# Patient Record
Sex: MALE | Race: WHITE | NOT HISPANIC OR LATINO | URBAN - METROPOLITAN AREA
[De-identification: names, ages, dates, MRNs, and addresses within clinical notes are randomized per-mention and may not be internally consistent; named-entity substitution may affect disease eponyms.]

---

## 2017-05-03 ENCOUNTER — OUTPATIENT (OUTPATIENT)
Dept: OUTPATIENT SERVICES | Facility: HOSPITAL | Age: 77
LOS: 1 days | End: 2017-05-03

## 2017-05-03 ENCOUNTER — INPATIENT (INPATIENT)
Facility: HOSPITAL | Age: 77
LOS: 1 days | Discharge: HOME | End: 2017-05-05
Payer: MEDICARE

## 2017-05-03 PROCEDURE — 72125 CT NECK SPINE W/O DYE: CPT | Mod: 26

## 2017-05-03 PROCEDURE — 70450 CT HEAD/BRAIN W/O DYE: CPT | Mod: 26

## 2017-05-03 PROCEDURE — 73030 X-RAY EXAM OF SHOULDER: CPT | Mod: 26,RT

## 2017-05-03 PROCEDURE — 99285 EMERGENCY DEPT VISIT HI MDM: CPT

## 2017-05-03 PROCEDURE — 71010: CPT | Mod: 26

## 2017-05-04 ENCOUNTER — OUTPATIENT (OUTPATIENT)
Dept: OUTPATIENT SERVICES | Facility: HOSPITAL | Age: 77
LOS: 1 days | End: 2017-05-04

## 2017-05-04 PROCEDURE — 74177 CT ABD & PELVIS W/CONTRAST: CPT | Mod: 26

## 2017-05-04 PROCEDURE — 71260 CT THORAX DX C+: CPT | Mod: 26

## 2017-05-05 ENCOUNTER — OUTPATIENT (OUTPATIENT)
Dept: OUTPATIENT SERVICES | Facility: HOSPITAL | Age: 77
LOS: 1 days | End: 2017-05-05

## 2017-05-05 PROCEDURE — 99221 1ST HOSP IP/OBS SF/LOW 40: CPT

## 2017-05-05 PROCEDURE — 77075 RADEX OSSEOUS SURVEY COMPL: CPT | Mod: 26

## 2017-05-08 ENCOUNTER — MOBILE ON CALL (OUTPATIENT)
Age: 77
End: 2017-05-08

## 2017-05-09 ENCOUNTER — APPOINTMENT (OUTPATIENT)
Dept: NEUROSURGERY | Facility: CLINIC | Age: 77
End: 2017-05-09

## 2017-05-09 VITALS
DIASTOLIC BLOOD PRESSURE: 68 MMHG | BODY MASS INDEX: 30.2 KG/M2 | SYSTOLIC BLOOD PRESSURE: 135 MMHG | HEIGHT: 72 IN | WEIGHT: 223 LBS | HEART RATE: 58 BPM

## 2017-05-09 PROBLEM — Z00.00 ENCOUNTER FOR PREVENTIVE HEALTH EXAMINATION: Status: ACTIVE | Noted: 2017-05-09

## 2017-05-09 RX ORDER — FINASTERIDE 5 MG/1
5 TABLET, FILM COATED ORAL
Refills: 0 | Status: ACTIVE | COMMUNITY

## 2017-05-09 RX ORDER — TAMSULOSIN HYDROCHLORIDE 0.4 MG/1
0.4 CAPSULE ORAL
Refills: 0 | Status: ACTIVE | COMMUNITY

## 2017-05-09 RX ORDER — AMIODARONE HYDROCHLORIDE 200 MG/1
200 TABLET ORAL
Refills: 0 | Status: ACTIVE | COMMUNITY

## 2017-05-09 RX ORDER — METOPROLOL TARTRATE 25 MG/1
25 TABLET, FILM COATED ORAL
Refills: 0 | Status: ACTIVE | COMMUNITY

## 2017-05-09 RX ORDER — OXYCODONE 5 MG/1
5 TABLET ORAL
Qty: 30 | Refills: 0 | Status: ACTIVE | COMMUNITY
Start: 2017-05-09 | End: 1900-01-01

## 2017-05-09 RX ORDER — APIXABAN 5 MG/1
5 TABLET, FILM COATED ORAL
Refills: 0 | Status: ACTIVE | COMMUNITY

## 2017-05-09 RX ORDER — OXYCODONE 5 MG/1
5 TABLET ORAL
Refills: 0 | Status: ACTIVE | COMMUNITY

## 2017-06-30 ENCOUNTER — APPOINTMENT (OUTPATIENT)
Dept: NEUROSURGERY | Facility: CLINIC | Age: 77
End: 2017-06-30

## 2017-06-30 VITALS
WEIGHT: 223 LBS | BODY MASS INDEX: 30.2 KG/M2 | DIASTOLIC BLOOD PRESSURE: 65 MMHG | HEIGHT: 72 IN | SYSTOLIC BLOOD PRESSURE: 120 MMHG

## 2017-06-30 DIAGNOSIS — S12.9XXA FRACTURE OF NECK, UNSPECIFIED, INITIAL ENCOUNTER: ICD-10-CM

## 2017-06-30 DIAGNOSIS — M54.2 CERVICALGIA: ICD-10-CM

## 2017-06-30 DIAGNOSIS — M47.812 SPONDYLOSIS W/OUT MYELOPATHY OR RADICULOPATHY, CERVICAL REGION: ICD-10-CM

## 2018-05-21 ENCOUNTER — OUTPATIENT (OUTPATIENT)
Dept: OUTPATIENT SERVICES | Facility: HOSPITAL | Age: 78
LOS: 1 days | Discharge: HOME | End: 2018-05-21

## 2019-02-01 ENCOUNTER — OUTPATIENT (OUTPATIENT)
Dept: OUTPATIENT SERVICES | Facility: HOSPITAL | Age: 79
LOS: 1 days | End: 2019-02-01

## 2019-02-04 ENCOUNTER — OUTPATIENT (OUTPATIENT)
Dept: OUTPATIENT SERVICES | Facility: HOSPITAL | Age: 79
LOS: 1 days | End: 2019-02-04
Payer: MEDICARE

## 2019-02-04 PROCEDURE — 73700 CT LOWER EXTREMITY W/O DYE: CPT | Mod: 26,LT

## 2020-02-10 ENCOUNTER — OUTPATIENT (OUTPATIENT)
Dept: OUTPATIENT SERVICES | Facility: HOSPITAL | Age: 80
LOS: 1 days | End: 2020-02-10

## 2020-02-10 ENCOUNTER — INPATIENT (INPATIENT)
Facility: HOSPITAL | Age: 80
LOS: 1 days | Discharge: HOME | End: 2020-02-12
Payer: MEDICARE

## 2020-02-10 PROCEDURE — 71045 X-RAY EXAM CHEST 1 VIEW: CPT | Mod: 26

## 2020-02-10 PROCEDURE — 99291 CRITICAL CARE FIRST HOUR: CPT

## 2020-02-11 ENCOUNTER — OUTPATIENT (OUTPATIENT)
Dept: OUTPATIENT SERVICES | Facility: HOSPITAL | Age: 80
LOS: 1 days | End: 2020-02-11

## 2020-02-12 ENCOUNTER — OUTPATIENT (OUTPATIENT)
Dept: OUTPATIENT SERVICES | Facility: HOSPITAL | Age: 80
LOS: 1 days | End: 2020-02-12

## 2020-11-16 ENCOUNTER — APPOINTMENT (OUTPATIENT)
Dept: ORTHOPEDIC SURGERY | Facility: CLINIC | Age: 80
End: 2020-11-16
Payer: MEDICARE

## 2020-11-16 VITALS
SYSTOLIC BLOOD PRESSURE: 152 MMHG | TEMPERATURE: 97.8 F | HEART RATE: 68 BPM | WEIGHT: 223 LBS | HEIGHT: 72 IN | BODY MASS INDEX: 30.2 KG/M2 | DIASTOLIC BLOOD PRESSURE: 76 MMHG

## 2020-11-16 PROCEDURE — 20550 NJX 1 TENDON SHEATH/LIGAMENT: CPT | Mod: F3

## 2020-11-16 PROCEDURE — 99203 OFFICE O/P NEW LOW 30 MIN: CPT | Mod: 25

## 2020-11-16 RX ADMIN — BETAMETHASONE ACETATE AND BETAMETHASONE SODIUM PHOSPHATE 6MG/ML PRESERVATIVE FREE MG/ML: 3; 3 INJECTION, SUSPENSION EPIDURAL; INTRAMUSCULAR; INTRASPINAL; INTRATHECAL at 00:00

## 2020-11-16 RX ADMIN — Medication %: at 00:00

## 2020-11-16 NOTE — DISCUSSION/SUMMARY
[FreeTextEntry1] : He has findings consistent with a left ring finger trigger finger\par \par I had a discussion regarding today's visit, the diagnosis, and treatment recommendations / options.  At this time I recommended a cortisone injection.\par \par The patient has agreed to this plan of management and has expressed full understanding.  All questions were fully answered to the patient's satisfaction.\par \par My time spent on this patient's visit included review of the medical records, review of pertinent diagnostic studies, examination and counseling of the patient and documentation in the medical records.  Over 50% of this time was spent on counseling the patient on the above diagnosis, treatment plan and prognosis.

## 2020-11-16 NOTE — PROCEDURE
[FreeTextEntry1] : -  After a discussion of risks and benefits, the patient agreed to proceed with a cortisone injection.  \par -  Side: Left \par -  Finger: Ring finger\par -  Medications: 0.5 cc of 1% Lidocaine and 1 cc of Betamethasone, 6mg/cc, using sterile technique.\par -  Patient tolerated the procedure well, without complications.\par -  Patient was told that the symptoms may worsen for a day or two, and should then begin to improve. \par -  Instructions: Patient was instructed on activity modification for the next several days.\par -  Follow-up: Within 4 weeks to assess response to the injection.

## 2020-11-16 NOTE — HISTORY OF PRESENT ILLNESS
[Right] : right hand dominant [FreeTextEntry1] : He comes in today for evaluation of a left ring finger trigger finger.  He has had symptoms for the past 3 weeks.\par

## 2020-11-16 NOTE — PHYSICAL EXAM
[de-identified] : - Constitutional: This is a male in no obvious distress.  \par - Psych: Patient is alert and oriented to person, place and time.  Patient has a normal mood and affect.\par - Cardiovascular: Normal pulses throughout the upper extremities.  No significant varicosities are noted in the upper extremities. \par - Neuro: Strength and sensation are intact throughout the upper extremities.  Patient has normal coordination.\par - Respiratory:  Patient exhibits no evidence of shortness of breath or difficulty breathing.\par - Skin: No rashes, lesions, or other abnormalities are noted in the upper extremities.\par \par ---\par \par Examination of his left hand demonstrates tenderness without swelling along the A1 pulley of the ring finger.  There is no triggering, as he cannot flex to the point of triggering.  There is no triggering of the other digits.  There is no contracture.  He is neurovascularly intact distally.

## 2020-12-07 PROBLEM — M65.342 TRIGGER RING FINGER OF LEFT HAND: Status: ACTIVE | Noted: 2020-11-16

## 2020-12-14 ENCOUNTER — APPOINTMENT (OUTPATIENT)
Dept: ORTHOPEDIC SURGERY | Facility: CLINIC | Age: 80
End: 2020-12-14
Payer: MEDICARE

## 2020-12-14 VITALS — BODY MASS INDEX: 30.2 KG/M2 | HEIGHT: 72 IN | TEMPERATURE: 97.7 F | WEIGHT: 223 LBS

## 2020-12-14 DIAGNOSIS — M65.342 TRIGGER FINGER, LEFT RING FINGER: ICD-10-CM

## 2020-12-14 PROCEDURE — 99213 OFFICE O/P EST LOW 20 MIN: CPT

## 2020-12-14 NOTE — ADDENDUM
[FreeTextEntry1] : I, Maggi Colón, acted solely as a scribe for Dr. Tse on this date 12/14/2020.\par \par

## 2020-12-14 NOTE — DISCUSSION/SUMMARY
[FreeTextEntry1] : I had a discussion regarding today's visit, the diagnosis and treatment recommendations / options.  At this time, as his symptoms have nearly resolved, I have recommended observation.  He will follow-up if his symptoms recur in the future.\par \par The patient has agreed to the above plan of management and has expressed full understanding.  All questions were fully answered to the patient's satisfaction.\par \par I spent at least 30 minutes in total on this patient's visit.  This included review of the medical records, review of pertinent diagnostic studies, examination and counseling of the patient and documentation in the medical records.  Over 50% of this time was spent on counseling the patient on the above diagnosis, treatment plan and prognosis.

## 2020-12-14 NOTE — END OF VISIT
[FreeTextEntry3] : All medical record entries made by the Scribe were at my, Dr. Tse, direction and personally dictated by me on 12/14/2020. I have reviewed the chart and agree that the record accurately reflects my personal performances of the history, physical exam, assessment, and plan. I have also personally directed, reviewed, and agreed with the chart.\par \par

## 2020-12-14 NOTE — HISTORY OF PRESENT ILLNESS
[Right] : right hand dominant [FreeTextEntry1] : 4 weeks status post left ring finger trigger cortisone injection #1.\par \par He is doing well with no pain, but he has residual locking. He states that he has a small lump at the base of his finger. He rates his pain a 0 out of 10. \par

## 2021-01-23 ENCOUNTER — EMERGENCY (EMERGENCY)
Facility: HOSPITAL | Age: 81
LOS: 1 days | End: 2021-01-23
Admitting: EMERGENCY MEDICINE
Payer: MEDICARE

## 2021-01-23 PROCEDURE — 99285 EMERGENCY DEPT VISIT HI MDM: CPT

## 2021-01-23 PROCEDURE — 93010 ELECTROCARDIOGRAM REPORT: CPT

## 2021-01-23 PROCEDURE — 71045 X-RAY EXAM CHEST 1 VIEW: CPT | Mod: 26

## 2021-03-22 ENCOUNTER — APPOINTMENT (OUTPATIENT)
Dept: CT IMAGING | Facility: CLINIC | Age: 81
End: 2021-03-22
Payer: MEDICARE

## 2021-03-22 ENCOUNTER — APPOINTMENT (OUTPATIENT)
Dept: RADIOLOGY | Facility: CLINIC | Age: 81
End: 2021-03-22

## 2021-03-22 PROCEDURE — 71046 X-RAY EXAM CHEST 2 VIEWS: CPT

## 2021-03-22 PROCEDURE — 71250 CT THORAX DX C-: CPT | Mod: MH

## 2021-03-29 ENCOUNTER — APPOINTMENT (OUTPATIENT)
Dept: ULTRASOUND IMAGING | Facility: CLINIC | Age: 81
End: 2021-03-29
Payer: MEDICARE

## 2021-03-29 PROCEDURE — 93926 LOWER EXTREMITY STUDY: CPT | Mod: RT

## 2021-07-08 ENCOUNTER — APPOINTMENT (OUTPATIENT)
Dept: CT IMAGING | Facility: CLINIC | Age: 81
End: 2021-07-08
Payer: MEDICARE

## 2021-07-08 PROCEDURE — 71250 CT THORAX DX C-: CPT | Mod: MH

## 2021-10-06 ENCOUNTER — OUTPATIENT (OUTPATIENT)
Dept: OUTPATIENT SERVICES | Facility: HOSPITAL | Age: 81
LOS: 1 days | End: 2021-10-06

## 2021-10-22 ENCOUNTER — OUTPATIENT (OUTPATIENT)
Dept: OUTPATIENT SERVICES | Facility: HOSPITAL | Age: 81
LOS: 1 days | End: 2021-10-22
Payer: MEDICARE

## 2021-10-22 PROCEDURE — 71046 X-RAY EXAM CHEST 2 VIEWS: CPT | Mod: 26

## 2021-10-23 ENCOUNTER — APPOINTMENT (OUTPATIENT)
Dept: RADIOLOGY | Facility: CLINIC | Age: 81
End: 2021-10-23

## 2021-11-30 ENCOUNTER — OUTPATIENT (OUTPATIENT)
Dept: OUTPATIENT SERVICES | Facility: HOSPITAL | Age: 81
LOS: 1 days | End: 2021-11-30

## 2021-12-03 ENCOUNTER — APPOINTMENT (OUTPATIENT)
Dept: ULTRASOUND IMAGING | Facility: CLINIC | Age: 81
End: 2021-12-03
Payer: MEDICARE

## 2021-12-03 PROCEDURE — 93931 UPPER EXTREMITY STUDY: CPT | Mod: RT

## 2021-12-03 PROCEDURE — 93971 EXTREMITY STUDY: CPT | Mod: RT

## 2022-11-17 ENCOUNTER — OFFICE (OUTPATIENT)
Dept: URBAN - METROPOLITAN AREA CLINIC 97 | Facility: CLINIC | Age: 82
Setting detail: OPHTHALMOLOGY
End: 2022-11-17
Payer: MEDICARE

## 2022-11-17 DIAGNOSIS — H02.011: ICD-10-CM

## 2022-11-17 DIAGNOSIS — H02.014: ICD-10-CM

## 2022-11-17 DIAGNOSIS — H02.012: ICD-10-CM

## 2022-11-17 DIAGNOSIS — H02.015: ICD-10-CM

## 2022-11-17 DIAGNOSIS — H40.013: ICD-10-CM

## 2022-11-17 DIAGNOSIS — H40.043: ICD-10-CM

## 2022-11-17 PROCEDURE — 99212 OFFICE O/P EST SF 10 MIN: CPT | Performed by: OPHTHALMOLOGY

## 2022-11-17 PROCEDURE — 92133 CPTRZD OPH DX IMG PST SGM ON: CPT | Performed by: OPHTHALMOLOGY

## 2022-11-17 PROCEDURE — 67820 REVISE EYELASHES: CPT | Performed by: OPHTHALMOLOGY

## 2022-11-17 ASSESSMENT — SPHEQUIV_DERIVED
OD_SPHEQUIV: -0.75
OS_SPHEQUIV: -1.375
OD_SPHEQUIV: -0.5
OS_SPHEQUIV: 1.125
OS_SPHEQUIV: -1.25
OD_SPHEQUIV: 2

## 2022-11-17 ASSESSMENT — REFRACTION_CURRENTRX
OD_SPHERE: +2.25
OD_OVR_VA: 20/
OS_VPRISM_DIRECTION: SV
OD_VPRISM_DIRECTION: SV
OD_VPRISM_DIRECTION: PROGS
OS_CYLINDER: +1.25
OS_VPRISM_DIRECTION: PROGS
OS_SPHERE: +1.75
OD_SPHERE: -0.25
OD_AXIS: 005
OS_OVR_VA: 20/
OS_AXIS: 166
OS_CYLINDER: +1.00
OS_OVR_VA: 20/
OD_AXIS: 010
OD_CYLINDER: +0.50
OD_CYLINDER: +0.50
OS_ADD: +2.50
OD_ADD: +2.50
OS_AXIS: 161
OD_OVR_VA: 20/
OS_SPHERE: -0.50

## 2022-11-17 ASSESSMENT — REFRACTION_AUTOREFRACTION
OS_CYLINDER: +1.75
OD_CYLINDER: +0.50
OD_AXIS: 031
OS_AXIS: 164
OD_SPHERE: -1.00
OS_SPHERE: -2.25

## 2022-11-17 ASSESSMENT — REFRACTION_MANIFEST
OU_VA: 20/20-2
OD_CYLINDER: +0.50
OS_SPHERE: -1.75
OD_AXIS: 010
OU_VA: 20/20-2
OS_VA1: 20/20-1
OS_AXIS: 165
OD_VA1: 20/25
OD_SPHERE: +1.75
OS_SPHERE: +0.50
OD_VPRISM_DIRECTION: BI
OS_VA2: 20/20
OD_VA2: 20/20
OS_VA2: 20/20
OD_HPRISM: 1
OS_VA1: 20/25
OD_ADD: +2.75
OD_AXIS: 010
OS_VPRISM_DIRECTION: BI
OS_CYLINDER: +1.25
OD_CYLINDER: +0.50
OD_SPHERE: -0.75
OS_ADD: +2.75
OS_CYLINDER: +1.00
OS_HPRISM: 1
OD_VA1: 20/20-2
OD_VA2: 20/20
OS_AXIS: 165

## 2022-11-17 ASSESSMENT — KERATOMETRY
OS_K1POWER_DIOPTERS: 39.50
METHOD_AUTO_MANUAL: AUTO
OS_AXISANGLE_DEGREES: 174
OD_AXISANGLE_DEGREES: 009
OS_K2POWER_DIOPTERS: 42.25
OD_K2POWER_DIOPTERS: 41.50
OD_K1POWER_DIOPTERS: 40.50

## 2022-11-17 ASSESSMENT — LID EXAM ASSESSMENTS
OD_TRICHIASIS: RLL RUL
OS_TRICHIASIS: LLL LUL

## 2022-11-17 ASSESSMENT — VISUAL ACUITY
OD_BCVA: 20/40
OS_BCVA: 20/25-

## 2022-11-17 ASSESSMENT — LID POSITION - PTOSIS
OS_PTOSIS: LUL 2+
OD_PTOSIS: RUL 2+

## 2022-11-17 ASSESSMENT — AXIALLENGTH_DERIVED
OS_AL: 25.1382
OD_AL: 24.8678
OS_AL: 25.1936
OS_AL: 24.1298
OD_AL: 24.7601
OD_AL: 23.7318

## 2022-11-17 ASSESSMENT — LID POSITION - COMMENTS
OD_COMMENTS: HORIZONTAL LID LAXITY
OS_COMMENTS: HORIZONTAL LID LAXITY

## 2022-11-17 ASSESSMENT — CONFRONTATIONAL VISUAL FIELD TEST (CVF)
OD_FINDINGS: FULL
OS_FINDINGS: FULL

## 2022-12-15 ENCOUNTER — OFFICE (OUTPATIENT)
Dept: URBAN - METROPOLITAN AREA CLINIC 97 | Facility: CLINIC | Age: 82
Setting detail: OPHTHALMOLOGY
End: 2022-12-15
Payer: MEDICARE

## 2022-12-15 DIAGNOSIS — H51.11: ICD-10-CM

## 2022-12-15 DIAGNOSIS — H40.013: ICD-10-CM

## 2022-12-15 DIAGNOSIS — H02.015: ICD-10-CM

## 2022-12-15 DIAGNOSIS — H02.011: ICD-10-CM

## 2022-12-15 DIAGNOSIS — H02.012: ICD-10-CM

## 2022-12-15 DIAGNOSIS — H43.813: ICD-10-CM

## 2022-12-15 DIAGNOSIS — H04.123: ICD-10-CM

## 2022-12-15 DIAGNOSIS — H02.824: ICD-10-CM

## 2022-12-15 DIAGNOSIS — H40.043: ICD-10-CM

## 2022-12-15 DIAGNOSIS — H02.014: ICD-10-CM

## 2022-12-15 DIAGNOSIS — H10.89: ICD-10-CM

## 2022-12-15 PROBLEM — H02.403 PTOSIS OF EYELID, UNSPECIFIED; BOTH EYES: Status: ACTIVE | Noted: 2022-12-15

## 2022-12-15 PROCEDURE — 67820 REVISE EYELASHES: CPT | Performed by: OPHTHALMOLOGY

## 2022-12-15 PROCEDURE — 92014 COMPRE OPH EXAM EST PT 1/>: CPT | Performed by: OPHTHALMOLOGY

## 2022-12-15 ASSESSMENT — KERATOMETRY
OD_K2POWER_DIOPTERS: 41.50
OS_K1POWER_DIOPTERS: 39.50
METHOD_AUTO_MANUAL: AUTO
OD_K1POWER_DIOPTERS: 40.50
OD_AXISANGLE_DEGREES: 009
OS_K2POWER_DIOPTERS: 42.25
OS_AXISANGLE_DEGREES: 174

## 2022-12-15 ASSESSMENT — REFRACTION_MANIFEST
OD_AXIS: 010
OS_AXIS: 165
OS_HPRISM: 1
OS_VA2: 20/20
OD_VPRISM_DIRECTION: BI
OD_CYLINDER: +0.50
OD_SPHERE: -0.75
OD_AXIS: 010
OS_AXIS: 165
OD_CYLINDER: +0.50
OS_CYLINDER: +1.00
OS_VA1: 20/20-1
OD_HPRISM: 1
OS_VA2: 20/20
OD_VA1: 20/25
OD_ADD: +2.75
OD_SPHERE: +2.00
OS_SPHERE: +1.00
OS_VPRISM_DIRECTION: BI
OD_VA2: 20/20
OS_CYLINDER: +1.25
OD_VA2: 20/20
OS_ADD: +2.75
OS_VA1: 20/25
OS_SPHERE: -1.75
OU_VA: 20/20-2
OD_VA1: 20/20-2
OU_VA: 20/20-2

## 2022-12-15 ASSESSMENT — REFRACTION_CURRENTRX
OS_OVR_VA: 20/
OS_CYLINDER: +1.25
OD_VPRISM_DIRECTION: PROGS
OD_ADD: +2.50
OD_SPHERE: +2.25
OS_SPHERE: +1.75
OD_AXIS: 005
OS_VPRISM_DIRECTION: PROGS
OS_CYLINDER: +1.00
OS_SPHERE: -0.50
OD_OVR_VA: 20/
OD_CYLINDER: +0.50
OS_VPRISM_DIRECTION: SV
OD_VPRISM_DIRECTION: SV
OS_OVR_VA: 20/
OD_CYLINDER: +0.50
OD_OVR_VA: 20/
OS_ADD: +2.50
OS_AXIS: 166
OD_AXIS: 010
OS_AXIS: 161
OD_SPHERE: -0.25

## 2022-12-15 ASSESSMENT — SPHEQUIV_DERIVED
OD_SPHEQUIV: -0.75
OD_SPHEQUIV: -0.5
OS_SPHEQUIV: 1.625
OS_SPHEQUIV: -1.375
OS_SPHEQUIV: -1.25
OD_SPHEQUIV: 2.25

## 2022-12-15 ASSESSMENT — VISUAL ACUITY
OS_BCVA: 20/25-
OD_BCVA: 20/40

## 2022-12-15 ASSESSMENT — CONFRONTATIONAL VISUAL FIELD TEST (CVF)
OD_FINDINGS: FULL
OS_FINDINGS: FULL

## 2022-12-15 ASSESSMENT — AXIALLENGTH_DERIVED
OD_AL: 23.6336
OD_AL: 24.8678
OS_AL: 25.1936
OS_AL: 23.9277
OS_AL: 25.1382
OD_AL: 24.7601

## 2022-12-15 ASSESSMENT — REFRACTION_AUTOREFRACTION
OD_CYLINDER: +0.50
OS_CYLINDER: +1.75
OD_AXIS: 031
OD_SPHERE: -1.00
OS_AXIS: 164
OS_SPHERE: -2.25

## 2022-12-15 ASSESSMENT — LID POSITION - COMMENTS
OD_COMMENTS: HORIZONTAL LID LAXITY
OS_COMMENTS: HORIZONTAL LID LAXITY

## 2022-12-15 ASSESSMENT — LID EXAM ASSESSMENTS
OS_TRICHIASIS: LLL LUL
OD_TRICHIASIS: RLL RUL

## 2022-12-15 ASSESSMENT — LID POSITION - PTOSIS
OS_PTOSIS: LUL 2+
OD_PTOSIS: RUL 2+

## 2023-01-26 ENCOUNTER — OFFICE (OUTPATIENT)
Dept: URBAN - METROPOLITAN AREA CLINIC 97 | Facility: CLINIC | Age: 83
Setting detail: OPHTHALMOLOGY
End: 2023-01-26
Payer: MEDICARE

## 2023-01-26 DIAGNOSIS — H02.014: ICD-10-CM

## 2023-01-26 DIAGNOSIS — H02.011: ICD-10-CM

## 2023-01-26 DIAGNOSIS — H02.012: ICD-10-CM

## 2023-01-26 DIAGNOSIS — H02.015: ICD-10-CM

## 2023-01-26 PROBLEM — H02.40 PTOSIS OF EYELID, UNSPECIFIED; BOTH EYES: Status: ACTIVE | Noted: 2023-01-26

## 2023-01-26 PROCEDURE — 67820 REVISE EYELASHES: CPT | Performed by: OPHTHALMOLOGY

## 2023-01-26 ASSESSMENT — REFRACTION_MANIFEST
OD_VA2: 20/20
OD_ADD: +2.75
OS_AXIS: 165
OS_ADD: +2.75
OS_VA1: 20/25
OS_SPHERE: -1.75
OD_SPHERE: -0.75
OD_SPHERE: +2.00
OS_HPRISM: 1
OS_AXIS: 165
OS_SPHERE: +1.00
OS_CYLINDER: +1.25
OD_VA1: 20/20-2
OD_HPRISM: 1
OS_VA2: 20/20
OS_VPRISM_DIRECTION: BI
OS_VA2: 20/20
OD_VA1: 20/25
OU_VA: 20/20-2
OD_VPRISM_DIRECTION: BI
OD_CYLINDER: +0.50
OD_AXIS: 010
OD_AXIS: 010
OS_VA1: 20/20-1
OS_CYLINDER: +1.00
OU_VA: 20/20-2
OD_VA2: 20/20
OD_CYLINDER: +0.50

## 2023-01-26 ASSESSMENT — REFRACTION_AUTOREFRACTION
OD_AXIS: 031
OS_SPHERE: -2.25
OD_CYLINDER: +0.50
OS_CYLINDER: +1.75
OS_AXIS: 164
OD_SPHERE: -1.00

## 2023-01-26 ASSESSMENT — KERATOMETRY
METHOD_AUTO_MANUAL: AUTO
OD_K2POWER_DIOPTERS: 41.50
OD_AXISANGLE_DEGREES: 009
OS_K1POWER_DIOPTERS: 39.50
OS_K2POWER_DIOPTERS: 42.25
OS_AXISANGLE_DEGREES: 174
OD_K1POWER_DIOPTERS: 40.50

## 2023-01-26 ASSESSMENT — REFRACTION_CURRENTRX
OS_OVR_VA: 20/
OS_ADD: +2.50
OS_CYLINDER: +1.25
OD_VPRISM_DIRECTION: SV
OS_OVR_VA: 20/
OS_VPRISM_DIRECTION: PROGS
OD_VPRISM_DIRECTION: PROGS
OD_OVR_VA: 20/
OD_SPHERE: +2.25
OS_CYLINDER: +1.00
OD_CYLINDER: +0.50
OD_OVR_VA: 20/
OS_SPHERE: -0.50
OD_CYLINDER: +0.50
OD_AXIS: 005
OD_ADD: +2.50
OS_SPHERE: +1.75
OS_VPRISM_DIRECTION: SV
OD_AXIS: 010
OD_SPHERE: -0.25
OS_AXIS: 161
OS_AXIS: 166

## 2023-01-26 ASSESSMENT — LID POSITION - PTOSIS
OD_PTOSIS: RUL 2+
OS_PTOSIS: LUL 2+

## 2023-01-26 ASSESSMENT — LID POSITION - COMMENTS
OD_COMMENTS: HORIZONTAL LID LAXITY
OS_COMMENTS: HORIZONTAL LID LAXITY

## 2023-01-26 ASSESSMENT — SPHEQUIV_DERIVED
OD_SPHEQUIV: -0.5
OS_SPHEQUIV: -1.25
OD_SPHEQUIV: 2.25
OD_SPHEQUIV: -0.75
OS_SPHEQUIV: -1.375
OS_SPHEQUIV: 1.625

## 2023-01-26 ASSESSMENT — LID EXAM ASSESSMENTS
OS_TRICHIASIS: LLL LUL
OD_TRICHIASIS: RLL RUL

## 2023-01-26 ASSESSMENT — CONFRONTATIONAL VISUAL FIELD TEST (CVF)
OD_FINDINGS: FULL
OS_FINDINGS: FULL

## 2023-01-26 ASSESSMENT — VISUAL ACUITY
OS_BCVA: 20/25-
OD_BCVA: 20/40

## 2023-01-26 ASSESSMENT — AXIALLENGTH_DERIVED
OS_AL: 25.1936
OS_AL: 23.9277
OD_AL: 24.7601
OS_AL: 25.1382
OD_AL: 24.8678
OD_AL: 23.6336

## 2023-02-17 PROBLEM — Z96.1 PSEUDOPHAKIA: Status: ACTIVE | Noted: 2023-02-17

## 2023-03-09 ENCOUNTER — OFFICE (OUTPATIENT)
Dept: URBAN - METROPOLITAN AREA CLINIC 97 | Facility: CLINIC | Age: 83
Setting detail: OPHTHALMOLOGY
End: 2023-03-09
Payer: MEDICARE

## 2023-03-09 DIAGNOSIS — H02.015: ICD-10-CM

## 2023-03-09 DIAGNOSIS — H02.014: ICD-10-CM

## 2023-03-09 DIAGNOSIS — H02.011: ICD-10-CM

## 2023-03-09 DIAGNOSIS — H02.012: ICD-10-CM

## 2023-03-09 PROCEDURE — 67820 REVISE EYELASHES: CPT | Performed by: OPHTHALMOLOGY

## 2023-03-09 ASSESSMENT — KERATOMETRY
OS_AXISANGLE_DEGREES: 174
OS_K2POWER_DIOPTERS: 42.25
OD_AXISANGLE_DEGREES: 009
METHOD_AUTO_MANUAL: AUTO
OD_K1POWER_DIOPTERS: 40.50
OS_K1POWER_DIOPTERS: 39.50
OD_K2POWER_DIOPTERS: 41.50

## 2023-03-09 ASSESSMENT — REFRACTION_CURRENTRX
OD_SPHERE: -0.25
OD_CYLINDER: +0.50
OS_AXIS: 166
OS_SPHERE: +1.75
OS_OVR_VA: 20/
OD_SPHERE: +2.25
OS_CYLINDER: +1.25
OS_ADD: +2.50
OD_VPRISM_DIRECTION: SV
OS_SPHERE: -0.50
OD_OVR_VA: 20/
OS_VPRISM_DIRECTION: SV
OS_OVR_VA: 20/
OD_CYLINDER: +0.50
OD_ADD: +2.50
OD_VPRISM_DIRECTION: PROGS
OD_OVR_VA: 20/
OS_CYLINDER: +1.00
OD_AXIS: 005
OD_AXIS: 010
OS_AXIS: 161
OS_VPRISM_DIRECTION: PROGS

## 2023-03-09 ASSESSMENT — AXIALLENGTH_DERIVED
OD_AL: 24.8678
OS_AL: 25.1936
OD_AL: 23.6336
OD_AL: 24.7601
OS_AL: 25.1382
OS_AL: 23.9277

## 2023-03-09 ASSESSMENT — REFRACTION_AUTOREFRACTION
OD_SPHERE: -1.00
OS_SPHERE: -2.25
OS_CYLINDER: +1.75
OD_AXIS: 031
OD_CYLINDER: +0.50
OS_AXIS: 164

## 2023-03-09 ASSESSMENT — CONFRONTATIONAL VISUAL FIELD TEST (CVF)
OS_FINDINGS: FULL
OD_FINDINGS: FULL

## 2023-03-09 ASSESSMENT — REFRACTION_MANIFEST
OD_CYLINDER: +0.50
OD_SPHERE: -0.75
OD_ADD: +2.75
OS_ADD: +2.75
OD_HPRISM: 1
OS_SPHERE: +1.00
OS_AXIS: 165
OU_VA: 20/20-2
OU_VA: 20/20-2
OD_AXIS: 010
OS_VA1: 20/20-1
OD_VA2: 20/20
OD_VA1: 20/25
OS_VA2: 20/20
OD_AXIS: 010
OS_CYLINDER: +1.25
OS_AXIS: 165
OD_SPHERE: +2.00
OS_HPRISM: 1
OS_VA2: 20/20
OS_VPRISM_DIRECTION: BI
OD_CYLINDER: +0.50
OS_VA1: 20/25
OD_VA2: 20/20
OS_CYLINDER: +1.00
OS_SPHERE: -1.75
OD_VA1: 20/20-2
OD_VPRISM_DIRECTION: BI

## 2023-03-09 ASSESSMENT — LID EXAM ASSESSMENTS
OD_TRICHIASIS: RLL RUL
OS_TRICHIASIS: LLL LUL

## 2023-03-09 ASSESSMENT — VISUAL ACUITY
OD_BCVA: 20/40
OS_BCVA: 20/25-

## 2023-03-09 ASSESSMENT — SPHEQUIV_DERIVED
OD_SPHEQUIV: -0.5
OS_SPHEQUIV: -1.375
OS_SPHEQUIV: 1.625
OS_SPHEQUIV: -1.25
OD_SPHEQUIV: 2.25
OD_SPHEQUIV: -0.75

## 2023-03-09 ASSESSMENT — LID POSITION - PTOSIS
OD_PTOSIS: RUL 2+
OS_PTOSIS: LUL 2+

## 2023-03-09 ASSESSMENT — LID POSITION - COMMENTS
OS_COMMENTS: HORIZONTAL LID LAXITY
OD_COMMENTS: HORIZONTAL LID LAXITY

## 2023-04-06 ENCOUNTER — OFFICE (OUTPATIENT)
Dept: URBAN - METROPOLITAN AREA CLINIC 97 | Facility: CLINIC | Age: 83
Setting detail: OPHTHALMOLOGY
End: 2023-04-06
Payer: MEDICARE

## 2023-04-06 DIAGNOSIS — H02.014: ICD-10-CM

## 2023-04-06 DIAGNOSIS — H02.012: ICD-10-CM

## 2023-04-06 DIAGNOSIS — H02.015: ICD-10-CM

## 2023-04-06 DIAGNOSIS — H02.011: ICD-10-CM

## 2023-04-06 PROBLEM — H51.11 CONVERGENCE INSUFFICIENCY OR PALSY ; BOTH EYES: Status: ACTIVE | Noted: 2023-03-09

## 2023-04-06 PROCEDURE — 67820 REVISE EYELASHES: CPT | Performed by: OPHTHALMOLOGY

## 2023-04-06 ASSESSMENT — REFRACTION_MANIFEST
OS_SPHERE: +1.00
OD_ADD: +2.75
OD_VA2: 20/20
OU_VA: 20/20-2
OS_ADD: +2.75
OS_VA2: 20/20
OS_VPRISM_DIRECTION: BI
OD_VA2: 20/20
OD_VA1: 20/25
OD_HPRISM: 1
OD_VPRISM_DIRECTION: BI
OD_SPHERE: +2.00
OU_VA: 20/20-2
OD_AXIS: 010
OS_SPHERE: -1.75
OS_AXIS: 165
OD_AXIS: 010
OD_CYLINDER: +0.50
OS_VA1: 20/20-1
OD_VA1: 20/20-2
OD_CYLINDER: +0.50
OS_CYLINDER: +1.00
OS_CYLINDER: +1.25
OS_AXIS: 165
OS_VA1: 20/25
OS_HPRISM: 1
OS_VA2: 20/20
OD_SPHERE: -0.75

## 2023-04-06 ASSESSMENT — REFRACTION_CURRENTRX
OD_ADD: +2.50
OD_AXIS: 005
OS_AXIS: 161
OD_SPHERE: +2.25
OD_VPRISM_DIRECTION: PROGS
OD_SPHERE: -0.25
OS_CYLINDER: +1.25
OS_OVR_VA: 20/
OS_VPRISM_DIRECTION: PROGS
OS_SPHERE: -0.50
OD_OVR_VA: 20/
OD_CYLINDER: +0.50
OS_SPHERE: +1.75
OS_VPRISM_DIRECTION: SV
OS_CYLINDER: +1.00
OS_AXIS: 166
OD_AXIS: 010
OD_VPRISM_DIRECTION: SV
OD_CYLINDER: +0.50
OS_OVR_VA: 20/
OD_OVR_VA: 20/
OS_ADD: +2.50

## 2023-04-06 ASSESSMENT — LID POSITION - PTOSIS
OS_PTOSIS: LUL 2+
OD_PTOSIS: RUL 2+

## 2023-04-06 ASSESSMENT — REFRACTION_AUTOREFRACTION
OD_SPHERE: -1.00
OS_CYLINDER: +1.75
OS_AXIS: 164
OS_SPHERE: -2.25
OD_AXIS: 031
OD_CYLINDER: +0.50

## 2023-04-06 ASSESSMENT — KERATOMETRY
OD_K2POWER_DIOPTERS: 41.50
OS_K1POWER_DIOPTERS: 39.50
METHOD_AUTO_MANUAL: AUTO
OD_K1POWER_DIOPTERS: 40.50
OD_AXISANGLE_DEGREES: 009
OS_AXISANGLE_DEGREES: 174
OS_K2POWER_DIOPTERS: 42.25

## 2023-04-06 ASSESSMENT — LID EXAM ASSESSMENTS
OS_TRICHIASIS: LLL LUL
OD_TRICHIASIS: RLL RUL

## 2023-04-06 ASSESSMENT — LID POSITION - COMMENTS
OS_COMMENTS: HORIZONTAL LID LAXITY
OD_COMMENTS: HORIZONTAL LID LAXITY

## 2023-04-06 ASSESSMENT — AXIALLENGTH_DERIVED
OS_AL: 25.1936
OD_AL: 24.7601
OS_AL: 25.1382
OD_AL: 23.6336
OS_AL: 23.9277
OD_AL: 24.8678

## 2023-04-06 ASSESSMENT — SPHEQUIV_DERIVED
OD_SPHEQUIV: -0.75
OS_SPHEQUIV: 1.625
OD_SPHEQUIV: -0.5
OS_SPHEQUIV: -1.25
OS_SPHEQUIV: -1.375
OD_SPHEQUIV: 2.25

## 2023-04-06 ASSESSMENT — PACHYMETRY
OS_CT_UM: 510
OD_CT_CORRECTION: 3
OS_CT_CORRECTION: 2
OD_CT_UM: 509

## 2023-04-06 ASSESSMENT — CONFRONTATIONAL VISUAL FIELD TEST (CVF)
OS_FINDINGS: FULL
OD_FINDINGS: FULL

## 2023-04-06 ASSESSMENT — VISUAL ACUITY
OD_BCVA: 20/40
OS_BCVA: 20/25-

## 2023-04-06 ASSESSMENT — TONOMETRY
OD_IOP_MMHG: 14
OS_IOP_MMHG: 18

## 2023-05-04 ENCOUNTER — RX ONLY (RX ONLY)
Age: 83
End: 2023-05-04

## 2023-05-04 ENCOUNTER — OFFICE (OUTPATIENT)
Dept: URBAN - METROPOLITAN AREA CLINIC 97 | Facility: CLINIC | Age: 83
Setting detail: OPHTHALMOLOGY
End: 2023-05-04
Payer: MEDICARE

## 2023-05-04 DIAGNOSIS — H02.012: ICD-10-CM

## 2023-05-04 DIAGNOSIS — H02.015: ICD-10-CM

## 2023-05-04 DIAGNOSIS — H02.014: ICD-10-CM

## 2023-05-04 DIAGNOSIS — H02.011: ICD-10-CM

## 2023-05-04 PROCEDURE — 67820 REVISE EYELASHES: CPT | Performed by: OPHTHALMOLOGY

## 2023-05-04 ASSESSMENT — REFRACTION_CURRENTRX
OD_VPRISM_DIRECTION: PROGS
OS_CYLINDER: +1.25
OS_ADD: +2.50
OD_ADD: +2.50
OS_AXIS: 161
OD_AXIS: 010
OD_AXIS: 005
OD_OVR_VA: 20/
OD_CYLINDER: +0.50
OS_AXIS: 166
OD_OVR_VA: 20/
OD_SPHERE: +2.25
OS_OVR_VA: 20/
OS_CYLINDER: +1.00
OD_VPRISM_DIRECTION: SV
OD_CYLINDER: +0.50
OS_SPHERE: -0.50
OS_SPHERE: +1.75
OD_SPHERE: -0.25
OS_VPRISM_DIRECTION: SV
OS_VPRISM_DIRECTION: PROGS
OS_OVR_VA: 20/

## 2023-05-04 ASSESSMENT — REFRACTION_MANIFEST
OS_CYLINDER: +1.00
OU_VA: 20/20-2
OD_VA1: 20/25
OD_VA1: 20/20-2
OD_HPRISM: 1
OS_HPRISM: 1
OD_VPRISM_DIRECTION: BI
OD_VA2: 20/20
OS_VA1: 20/20-1
OD_ADD: +2.75
OD_VA2: 20/20
OS_CYLINDER: +1.25
OS_ADD: +2.75
OS_VA1: 20/25
OD_AXIS: 010
OU_VA: 20/20-2
OS_VA2: 20/20
OS_AXIS: 165
OD_CYLINDER: +0.50
OD_CYLINDER: +0.50
OS_VA2: 20/20
OS_AXIS: 165
OD_SPHERE: -0.75
OS_SPHERE: +1.00
OD_AXIS: 010
OD_SPHERE: +2.00
OS_VPRISM_DIRECTION: BI
OS_SPHERE: -1.75

## 2023-05-04 ASSESSMENT — KERATOMETRY
OD_AXISANGLE_DEGREES: 009
OS_AXISANGLE_DEGREES: 174
OS_K2POWER_DIOPTERS: 42.25
OD_K2POWER_DIOPTERS: 41.50
OD_K1POWER_DIOPTERS: 40.50
OS_K1POWER_DIOPTERS: 39.50
METHOD_AUTO_MANUAL: AUTO

## 2023-05-04 ASSESSMENT — AXIALLENGTH_DERIVED
OS_AL: 25.1382
OD_AL: 24.7601
OS_AL: 23.9277
OD_AL: 23.6336
OS_AL: 25.1936
OD_AL: 24.8678

## 2023-05-04 ASSESSMENT — PACHYMETRY
OD_CT_CORRECTION: 3
OS_CT_CORRECTION: 2
OS_CT_UM: 510
OD_CT_UM: 509

## 2023-05-04 ASSESSMENT — SPHEQUIV_DERIVED
OS_SPHEQUIV: 1.625
OD_SPHEQUIV: -0.75
OD_SPHEQUIV: 2.25
OS_SPHEQUIV: -1.25
OS_SPHEQUIV: -1.375
OD_SPHEQUIV: -0.5

## 2023-05-04 ASSESSMENT — LID POSITION - PTOSIS
OD_PTOSIS: RUL 2+
OS_PTOSIS: LUL 2+

## 2023-05-04 ASSESSMENT — REFRACTION_AUTOREFRACTION
OD_CYLINDER: +0.50
OD_AXIS: 031
OS_SPHERE: -2.25
OD_SPHERE: -1.00
OS_AXIS: 164
OS_CYLINDER: +1.75

## 2023-05-04 ASSESSMENT — VISUAL ACUITY
OD_BCVA: 20/40
OS_BCVA: 20/25-

## 2023-05-04 ASSESSMENT — CONFRONTATIONAL VISUAL FIELD TEST (CVF)
OS_FINDINGS: FULL
OD_FINDINGS: FULL

## 2023-05-04 ASSESSMENT — LID EXAM ASSESSMENTS
OD_TRICHIASIS: RLL RUL
OS_TRICHIASIS: LLL LUL

## 2023-05-04 ASSESSMENT — LID POSITION - COMMENTS
OS_COMMENTS: HORIZONTAL LID LAXITY
OD_COMMENTS: HORIZONTAL LID LAXITY

## 2023-05-04 ASSESSMENT — TONOMETRY
OS_IOP_MMHG: 16
OD_IOP_MMHG: 13

## 2023-05-23 ENCOUNTER — APPOINTMENT (OUTPATIENT)
Dept: RADIOLOGY | Facility: CLINIC | Age: 83
End: 2023-05-23
Payer: MEDICARE

## 2023-05-23 PROCEDURE — 77080 DXA BONE DENSITY AXIAL: CPT

## 2023-05-23 PROCEDURE — 71046 X-RAY EXAM CHEST 2 VIEWS: CPT

## 2023-06-01 ENCOUNTER — OFFICE (OUTPATIENT)
Dept: URBAN - METROPOLITAN AREA CLINIC 97 | Facility: CLINIC | Age: 83
Setting detail: OPHTHALMOLOGY
End: 2023-06-01
Payer: MEDICARE

## 2023-06-01 DIAGNOSIS — H02.014: ICD-10-CM

## 2023-06-01 DIAGNOSIS — H02.015: ICD-10-CM

## 2023-06-01 DIAGNOSIS — H02.012: ICD-10-CM

## 2023-06-01 DIAGNOSIS — H02.011: ICD-10-CM

## 2023-06-01 PROBLEM — H51.11 CONVERGENCE INSUFFICIENCY OR PALSY ; BOTH EYES: Status: ACTIVE | Noted: 2023-05-04

## 2023-06-01 PROCEDURE — 67820 REVISE EYELASHES: CPT | Performed by: OPHTHALMOLOGY

## 2023-06-01 ASSESSMENT — REFRACTION_MANIFEST
OS_VA2: 20/20
OS_SPHERE: +1.00
OD_ADD: +2.75
OD_HPRISM: 1
OS_VA1: 20/20-1
OD_AXIS: 010
OS_CYLINDER: +1.25
OS_VA2: 20/20
OD_SPHERE: -0.50
OU_VA: 20/20-2
OS_ADD: +2.75
OU_VA: 20/20-2
OS_CYLINDER: +1.00
OS_VPRISM_DIRECTION: BI
OD_VA2: 20/20
OD_VA2: 20/20
OD_CYLINDER: +0.50
OD_SPHERE: +2.00
OD_VA1: 20/25
OS_AXIS: 165
OS_SPHERE: -1.75
OD_VPRISM_DIRECTION: BI
OS_VA1: 20/25
OD_CYLINDER: +0.50
OD_VA1: 20/20-2
OS_AXIS: 165
OD_AXIS: 010
OS_HPRISM: 1

## 2023-06-01 ASSESSMENT — SPHEQUIV_DERIVED
OS_SPHEQUIV: 1.625
OS_SPHEQUIV: -1.25
OD_SPHEQUIV: -0.75
OS_SPHEQUIV: -1.375
OD_SPHEQUIV: -0.25
OD_SPHEQUIV: 2.25

## 2023-06-01 ASSESSMENT — REFRACTION_CURRENTRX
OD_CYLINDER: +0.50
OS_CYLINDER: +1.25
OS_AXIS: 161
OD_SPHERE: -0.25
OS_SPHERE: +1.75
OS_ADD: +2.50
OS_SPHERE: -0.50
OD_ADD: +2.50
OD_AXIS: 010
OS_VPRISM_DIRECTION: SV
OS_CYLINDER: +1.00
OD_OVR_VA: 20/
OD_AXIS: 005
OS_VPRISM_DIRECTION: PROGS
OD_VPRISM_DIRECTION: SV
OS_OVR_VA: 20/
OD_SPHERE: +2.25
OD_OVR_VA: 20/
OD_VPRISM_DIRECTION: PROGS
OS_AXIS: 166
OS_OVR_VA: 20/
OD_CYLINDER: +0.50

## 2023-06-01 ASSESSMENT — KERATOMETRY
OS_K2POWER_DIOPTERS: 42.25
OD_K1POWER_DIOPTERS: 40.50
OS_K1POWER_DIOPTERS: 39.50
OD_AXISANGLE_DEGREES: 009
OS_AXISANGLE_DEGREES: 174
OD_K2POWER_DIOPTERS: 41.50
METHOD_AUTO_MANUAL: AUTO

## 2023-06-01 ASSESSMENT — LID EXAM ASSESSMENTS
OS_TRICHIASIS: LLL LUL
OD_TRICHIASIS: RLL RUL

## 2023-06-01 ASSESSMENT — CONFRONTATIONAL VISUAL FIELD TEST (CVF)
OD_FINDINGS: FULL
OS_FINDINGS: FULL

## 2023-06-01 ASSESSMENT — REFRACTION_AUTOREFRACTION
OD_SPHERE: -1.00
OD_AXIS: 031
OS_CYLINDER: +1.75
OS_SPHERE: -2.25
OS_AXIS: 164
OD_CYLINDER: +0.50

## 2023-06-01 ASSESSMENT — AXIALLENGTH_DERIVED
OD_AL: 24.6533
OS_AL: 25.1936
OD_AL: 24.8678
OD_AL: 23.6336
OS_AL: 25.1382
OS_AL: 23.9277

## 2023-06-01 ASSESSMENT — VISUAL ACUITY
OS_BCVA: 20/25
OD_BCVA: 20/25

## 2023-06-01 ASSESSMENT — LID POSITION - PTOSIS
OS_PTOSIS: LUL 2+
OD_PTOSIS: RUL 2+

## 2023-06-01 ASSESSMENT — TONOMETRY
OS_IOP_MMHG: 15
OD_IOP_MMHG: 15

## 2023-06-01 ASSESSMENT — PACHYMETRY
OD_CT_CORRECTION: 3
OS_CT_CORRECTION: 2
OD_CT_UM: 509
OS_CT_UM: 510

## 2023-06-01 ASSESSMENT — LID POSITION - COMMENTS
OD_COMMENTS: HORIZONTAL LID LAXITY
OS_COMMENTS: HORIZONTAL LID LAXITY

## 2023-06-20 ENCOUNTER — APPOINTMENT (OUTPATIENT)
Dept: RADIOLOGY | Facility: CLINIC | Age: 83
End: 2023-06-20
Payer: MEDICARE

## 2023-06-20 PROCEDURE — 72100 X-RAY EXAM L-S SPINE 2/3 VWS: CPT

## 2023-07-08 ENCOUNTER — OFFICE (OUTPATIENT)
Dept: URBAN - METROPOLITAN AREA CLINIC 38 | Facility: CLINIC | Age: 83
Setting detail: OPHTHALMOLOGY
End: 2023-07-08
Payer: MEDICARE

## 2023-07-08 DIAGNOSIS — H02.012: ICD-10-CM

## 2023-07-08 DIAGNOSIS — H02.015: ICD-10-CM

## 2023-07-08 DIAGNOSIS — H02.014: ICD-10-CM

## 2023-07-08 DIAGNOSIS — H02.011: ICD-10-CM

## 2023-07-08 PROCEDURE — 67820 REVISE EYELASHES: CPT | Performed by: OPTOMETRIST

## 2023-07-08 ASSESSMENT — REFRACTION_AUTOREFRACTION
OD_AXIS: 031
OS_SPHERE: -2.25
OD_CYLINDER: +0.50
OS_CYLINDER: +1.75
OS_AXIS: 164
OD_SPHERE: -1.00

## 2023-07-08 ASSESSMENT — LID EXAM ASSESSMENTS
OD_MEIBOMITIS: RLL RUL 1+
OS_TRICHIASIS: LLL LUL
OS_MEIBOMITIS: LLL LUL 1+
OD_TRICHIASIS: RLL RUL

## 2023-07-08 ASSESSMENT — REFRACTION_CURRENTRX
OS_SPHERE: +1.75
OS_AXIS: 161
OS_OVR_VA: 20/
OS_OVR_VA: 20/
OD_SPHERE: -0.25
OD_AXIS: 010
OD_AXIS: 005
OD_VPRISM_DIRECTION: SV
OS_ADD: +2.50
OD_ADD: +2.50
OS_VPRISM_DIRECTION: PROGS
OD_VPRISM_DIRECTION: PROGS
OD_OVR_VA: 20/
OD_CYLINDER: +0.50
OD_OVR_VA: 20/
OD_CYLINDER: +0.50
OS_CYLINDER: +1.00
OS_VPRISM_DIRECTION: SV
OD_SPHERE: +2.25
OS_CYLINDER: +1.25
OS_SPHERE: -0.50
OS_AXIS: 166

## 2023-07-08 ASSESSMENT — REFRACTION_MANIFEST
OD_AXIS: 010
OD_SPHERE: -0.50
OD_VA2: 20/20
OS_AXIS: 165
OS_VA2: 20/20
OD_VA2: 20/20
OD_CYLINDER: +0.50
OS_CYLINDER: +1.25
OS_VPRISM_DIRECTION: BI
OD_VA1: 20/25
OS_VA2: 20/20
OU_VA: 20/20-2
OD_VPRISM_DIRECTION: BI
OS_AXIS: 165
OD_VA1: 20/20-2
OD_ADD: +2.75
OS_VA1: 20/25
OD_HPRISM: 1
OS_SPHERE: -1.75
OS_CYLINDER: +1.00
OS_SPHERE: +1.00
OS_VA1: 20/20-1
OD_CYLINDER: +0.50
OU_VA: 20/20-2
OS_ADD: +2.75
OD_AXIS: 010
OS_HPRISM: 1
OD_SPHERE: +2.00

## 2023-07-08 ASSESSMENT — LID POSITION - PTOSIS
OS_PTOSIS: LUL 2+
OD_PTOSIS: RUL 2+

## 2023-07-08 ASSESSMENT — AXIALLENGTH_DERIVED
OD_AL: 24.6533
OD_AL: 24.8678
OS_AL: 25.1382
OS_AL: 25.1936
OD_AL: 23.6336
OS_AL: 23.9277

## 2023-07-08 ASSESSMENT — SPHEQUIV_DERIVED
OD_SPHEQUIV: -0.25
OS_SPHEQUIV: -1.375
OS_SPHEQUIV: 1.625
OD_SPHEQUIV: -0.75
OS_SPHEQUIV: -1.25
OD_SPHEQUIV: 2.25

## 2023-07-08 ASSESSMENT — VISUAL ACUITY
OD_BCVA: 20/30-1
OS_BCVA: 20/25-1

## 2023-07-08 ASSESSMENT — KERATOMETRY
OD_K1POWER_DIOPTERS: 40.50
OS_AXISANGLE_DEGREES: 174
OD_AXISANGLE_DEGREES: 009
OS_K1POWER_DIOPTERS: 39.50
OS_K2POWER_DIOPTERS: 42.25
METHOD_AUTO_MANUAL: AUTO
OD_K2POWER_DIOPTERS: 41.50

## 2023-07-08 ASSESSMENT — CONFRONTATIONAL VISUAL FIELD TEST (CVF)
OS_FINDINGS: FULL
OD_FINDINGS: FULL

## 2023-07-08 ASSESSMENT — LID POSITION - COMMENTS
OS_COMMENTS: HORIZONTAL LID LAXITY
OD_COMMENTS: HORIZONTAL LID LAXITY

## 2023-07-28 ENCOUNTER — OFFICE (OUTPATIENT)
Dept: URBAN - METROPOLITAN AREA CLINIC 38 | Facility: CLINIC | Age: 83
Setting detail: OPHTHALMOLOGY
End: 2023-07-28
Payer: MEDICARE

## 2023-07-28 ENCOUNTER — RX ONLY (RX ONLY)
Age: 83
End: 2023-07-28

## 2023-07-28 DIAGNOSIS — H10.89: ICD-10-CM

## 2023-07-28 DIAGNOSIS — H02.005: ICD-10-CM

## 2023-07-28 DIAGNOSIS — H02.014: ICD-10-CM

## 2023-07-28 DIAGNOSIS — H02.012: ICD-10-CM

## 2023-07-28 DIAGNOSIS — H02.004: ICD-10-CM

## 2023-07-28 DIAGNOSIS — H02.002: ICD-10-CM

## 2023-07-28 DIAGNOSIS — H02.011: ICD-10-CM

## 2023-07-28 DIAGNOSIS — H02.001: ICD-10-CM

## 2023-07-28 DIAGNOSIS — H02.015: ICD-10-CM

## 2023-07-28 DIAGNOSIS — H02.403: ICD-10-CM

## 2023-07-28 DIAGNOSIS — H16.223: ICD-10-CM

## 2023-07-28 PROCEDURE — 99213 OFFICE O/P EST LOW 20 MIN: CPT | Performed by: OPHTHALMOLOGY

## 2023-07-28 ASSESSMENT — SUPERFICIAL PUNCTATE KERATITIS (SPK)
OD_SPK: 3+
OS_SPK: 3+

## 2023-07-28 ASSESSMENT — REFRACTION_CURRENTRX
OS_CYLINDER: +1.25
OS_ADD: +2.50
OD_VPRISM_DIRECTION: SV
OS_CYLINDER: +1.00
OS_SPHERE: +1.75
OS_OVR_VA: 20/
OD_SPHERE: +2.25
OD_AXIS: 005
OD_AXIS: 010
OD_OVR_VA: 20/
OS_OVR_VA: 20/
OS_VPRISM_DIRECTION: SV
OD_OVR_VA: 20/
OD_VPRISM_DIRECTION: PROGS
OS_VPRISM_DIRECTION: PROGS
OD_CYLINDER: +0.50
OD_CYLINDER: +0.50
OS_SPHERE: -0.50
OD_SPHERE: -0.25
OD_ADD: +2.50
OS_AXIS: 166
OS_AXIS: 161

## 2023-07-28 ASSESSMENT — CONFRONTATIONAL VISUAL FIELD TEST (CVF)
OS_FINDINGS: FULL
OD_FINDINGS: FULL

## 2023-07-28 ASSESSMENT — LID EXAM ASSESSMENTS
OD_MRD1: 0 MM
OS_TRICHIASIS: LLL LUL
OD_LEVATOR_FUNCTION: 15 MM
OS_MEIBOMITIS: LLL LUL 1+
OD_MEIBOMITIS: RLL RUL 1+
OD_TRICHIASIS: RLL RUL
OS_LEVATOR_FUNCTION: 15 MM
OS_MRD1: 0 MM

## 2023-07-28 ASSESSMENT — SPHEQUIV_DERIVED
OD_SPHEQUIV: -0.75
OS_SPHEQUIV: -1.375

## 2023-07-28 ASSESSMENT — LID POSITION - ENTROPION
OS_ENTROPION: LLL LUL 2+
OD_ENTROPION: RLL RUL 2+

## 2023-07-28 ASSESSMENT — TONOMETRY
OD_IOP_MMHG: 16
OS_IOP_MMHG: 20

## 2023-07-28 ASSESSMENT — PACHYMETRY
OS_CT_UM: 510
OD_CT_UM: 509
OD_CT_CORRECTION: 3
OS_CT_CORRECTION: 2

## 2023-07-28 ASSESSMENT — KERATOMETRY
OS_K1POWER_DIOPTERS: 39.50
OD_K2POWER_DIOPTERS: 41.50
OS_AXISANGLE_DEGREES: 174
METHOD_AUTO_MANUAL: AUTO
OD_K1POWER_DIOPTERS: 40.50
OS_K2POWER_DIOPTERS: 42.25
OD_AXISANGLE_DEGREES: 009

## 2023-07-28 ASSESSMENT — LID POSITION - COMMENTS
OD_COMMENTS: HORIZONTAL LID LAXITY
OS_COMMENTS: HORIZONTAL LID LAXITY

## 2023-07-28 ASSESSMENT — VISUAL ACUITY
OS_BCVA: 20/20-
OD_BCVA: 20/40

## 2023-07-28 ASSESSMENT — AXIALLENGTH_DERIVED
OD_AL: 24.8678
OS_AL: 25.1936

## 2023-07-28 ASSESSMENT — REFRACTION_AUTOREFRACTION
OD_CYLINDER: +0.50
OD_AXIS: 031
OS_CYLINDER: +1.75
OS_SPHERE: -2.25
OS_AXIS: 164
OD_SPHERE: -1.00

## 2023-07-28 ASSESSMENT — LID POSITION - PTOSIS
OS_PTOSIS: LUL 2+
OD_PTOSIS: RUL 2+

## 2023-08-03 ENCOUNTER — OFFICE (OUTPATIENT)
Dept: URBAN - METROPOLITAN AREA CLINIC 97 | Facility: CLINIC | Age: 83
Setting detail: OPHTHALMOLOGY
End: 2023-08-03
Payer: MEDICARE

## 2023-08-03 DIAGNOSIS — H11.32: ICD-10-CM

## 2023-08-03 PROBLEM — H02.89 MEIBOMIAN GLAND DYSFUNCTION ; BOTH EYES: Status: ACTIVE | Noted: 2023-07-08

## 2023-08-03 PROBLEM — H02.002 ENTROPION; RIGHT UPPER LID, RIGHT LOWER LID, LEFT UPPER LID, LEFT LOWER LID: Status: ACTIVE | Noted: 2023-07-28

## 2023-08-03 PROBLEM — H02.004 ENTROPION; RIGHT UPPER LID, RIGHT LOWER LID, LEFT UPPER LID, LEFT LOWER LID: Status: ACTIVE | Noted: 2023-07-28

## 2023-08-03 PROBLEM — H02.001 ENTROPION; RIGHT UPPER LID, RIGHT LOWER LID, LEFT UPPER LID, LEFT LOWER LID: Status: ACTIVE | Noted: 2023-07-28

## 2023-08-03 PROBLEM — H02.005 ENTROPION; RIGHT UPPER LID, RIGHT LOWER LID, LEFT UPPER LID, LEFT LOWER LID: Status: ACTIVE | Noted: 2023-07-28

## 2023-08-03 PROBLEM — H16.223 DRY EYE SYNDROME K SICCA; BOTH EYES: Status: ACTIVE | Noted: 2023-07-28

## 2023-08-03 PROBLEM — H02.40 PTOSIS OF EYELID, UNSPECIFIED; BOTH EYES: Status: ACTIVE | Noted: 2023-07-28

## 2023-08-03 PROBLEM — H02.825 LID LESION; LEFT LOWER LID: Status: ACTIVE | Noted: 2023-07-28

## 2023-08-03 PROCEDURE — 99213 OFFICE O/P EST LOW 20 MIN: CPT

## 2023-08-03 ASSESSMENT — VISUAL ACUITY
OD_BCVA: 20/40
OS_BCVA: 20/20-

## 2023-08-03 ASSESSMENT — KERATOMETRY
OS_AXISANGLE_DEGREES: 174
OD_K1POWER_DIOPTERS: 40.50
OS_K2POWER_DIOPTERS: 42.25
OD_AXISANGLE_DEGREES: 009
METHOD_AUTO_MANUAL: AUTO
OD_K2POWER_DIOPTERS: 41.50
OS_K1POWER_DIOPTERS: 39.50

## 2023-08-03 ASSESSMENT — TONOMETRY
OD_IOP_MMHG: 18
OS_IOP_MMHG: 20

## 2023-08-03 ASSESSMENT — LID POSITION - COMMENTS
OD_COMMENTS: HORIZONTAL LID LAXITY
OS_COMMENTS: HORIZONTAL LID LAXITY

## 2023-08-03 ASSESSMENT — LID POSITION - PTOSIS
OD_PTOSIS: RUL 2+
OS_PTOSIS: LUL 2+

## 2023-08-03 ASSESSMENT — REFRACTION_CURRENTRX
OS_CYLINDER: +1.25
OD_OVR_VA: 20/
OS_CYLINDER: +1.00
OD_VPRISM_DIRECTION: PROGS
OS_AXIS: 161
OS_OVR_VA: 20/
OS_VPRISM_DIRECTION: SV
OD_CYLINDER: +0.50
OS_ADD: +2.50
OD_SPHERE: +2.25
OD_AXIS: 010
OD_VPRISM_DIRECTION: SV
OD_OVR_VA: 20/
OD_CYLINDER: +0.50
OS_VPRISM_DIRECTION: PROGS
OS_OVR_VA: 20/
OD_ADD: +2.50
OD_AXIS: 005
OS_SPHERE: -0.50
OD_SPHERE: -0.25
OS_SPHERE: +1.75
OS_AXIS: 166

## 2023-08-03 ASSESSMENT — PACHYMETRY
OD_CT_CORRECTION: 3
OD_CT_UM: 509
OS_CT_CORRECTION: 2
OS_CT_UM: 510

## 2023-08-03 ASSESSMENT — SUPERFICIAL PUNCTATE KERATITIS (SPK)
OD_SPK: 3+
OS_SPK: 3+

## 2023-08-03 ASSESSMENT — LID POSITION - ENTROPION
OS_ENTROPION: LLL LUL 2+
OD_ENTROPION: RLL RUL 2+

## 2023-08-03 ASSESSMENT — LID EXAM ASSESSMENTS
OS_TRICHIASIS: LLL LUL
OD_TRICHIASIS: RLL RUL

## 2023-08-03 ASSESSMENT — CONFRONTATIONAL VISUAL FIELD TEST (CVF)
OD_FINDINGS: FULL
OS_FINDINGS: FULL

## 2023-08-03 ASSESSMENT — AXIALLENGTH_DERIVED
OD_AL: 24.8678
OS_AL: 25.1936

## 2023-08-03 ASSESSMENT — REFRACTION_AUTOREFRACTION
OD_SPHERE: -1.00
OS_CYLINDER: +1.75
OD_AXIS: 031
OS_AXIS: 164
OS_SPHERE: -2.25
OD_CYLINDER: +0.50

## 2023-08-03 ASSESSMENT — SPHEQUIV_DERIVED
OD_SPHEQUIV: -0.75
OS_SPHEQUIV: -1.375

## 2023-08-12 ENCOUNTER — OFFICE (OUTPATIENT)
Dept: URBAN - METROPOLITAN AREA CLINIC 38 | Facility: CLINIC | Age: 83
Setting detail: OPHTHALMOLOGY
End: 2023-08-12
Payer: MEDICARE

## 2023-08-12 DIAGNOSIS — H11.32: ICD-10-CM

## 2023-08-12 DIAGNOSIS — H35.363: ICD-10-CM

## 2023-08-12 PROCEDURE — 99213 OFFICE O/P EST LOW 20 MIN: CPT | Performed by: OPTOMETRIST

## 2023-08-12 PROCEDURE — 92134 CPTRZ OPH DX IMG PST SGM RTA: CPT | Performed by: OPTOMETRIST

## 2023-08-12 ASSESSMENT — REFRACTION_CURRENTRX
OS_VPRISM_DIRECTION: PROGS
OD_AXIS: 010
OS_CYLINDER: +1.00
OD_OVR_VA: 20/
OD_SPHERE: -0.25
OD_CYLINDER: +0.50
OD_AXIS: 005
OS_SPHERE: +1.75
OS_ADD: +2.50
OS_CYLINDER: +1.25
OS_OVR_VA: 20/
OS_AXIS: 166
OD_VPRISM_DIRECTION: PROGS
OD_SPHERE: +2.25
OD_OVR_VA: 20/
OD_VPRISM_DIRECTION: SV
OS_VPRISM_DIRECTION: SV
OS_SPHERE: -0.50
OD_CYLINDER: +0.50
OS_OVR_VA: 20/
OD_ADD: +2.50
OS_AXIS: 161

## 2023-08-12 ASSESSMENT — SPHEQUIV_DERIVED
OS_SPHEQUIV: -1.375
OD_SPHEQUIV: -0.75

## 2023-08-12 ASSESSMENT — KERATOMETRY
OS_K2POWER_DIOPTERS: 42.25
OS_AXISANGLE_DEGREES: 174
OS_K1POWER_DIOPTERS: 39.50
OD_K2POWER_DIOPTERS: 41.50
OD_K1POWER_DIOPTERS: 40.50
OD_AXISANGLE_DEGREES: 009
METHOD_AUTO_MANUAL: AUTO

## 2023-08-12 ASSESSMENT — REFRACTION_AUTOREFRACTION
OS_AXIS: 164
OD_CYLINDER: +0.50
OS_SPHERE: -2.25
OS_CYLINDER: +1.75
OD_SPHERE: -1.00
OD_AXIS: 031

## 2023-08-12 ASSESSMENT — LID EXAM ASSESSMENTS
OD_TRICHIASIS: RLL RUL
OS_TRICHIASIS: LLL LUL

## 2023-08-12 ASSESSMENT — PACHYMETRY
OS_CT_UM: 510
OD_CT_UM: 509
OS_CT_CORRECTION: 2
OD_CT_CORRECTION: 3

## 2023-08-12 ASSESSMENT — VISUAL ACUITY
OD_BCVA: 20/40
OS_BCVA: 20/30

## 2023-08-12 ASSESSMENT — LID POSITION - PTOSIS
OD_PTOSIS: RUL 2+
OS_PTOSIS: LUL 2+

## 2023-08-12 ASSESSMENT — LID POSITION - ENTROPION
OD_ENTROPION: RLL RUL 2+
OS_ENTROPION: LLL LUL 2+

## 2023-08-12 ASSESSMENT — AXIALLENGTH_DERIVED
OS_AL: 25.1936
OD_AL: 24.8678

## 2023-08-12 ASSESSMENT — CONFRONTATIONAL VISUAL FIELD TEST (CVF)
OS_FINDINGS: FULL
OD_FINDINGS: FULL

## 2023-08-12 ASSESSMENT — LID POSITION - COMMENTS
OD_COMMENTS: HORIZONTAL LID LAXITY
OS_COMMENTS: HORIZONTAL LID LAXITY

## 2023-08-12 ASSESSMENT — SUPERFICIAL PUNCTATE KERATITIS (SPK)
OS_SPK: 3+
OD_SPK: 3+

## 2023-09-05 ENCOUNTER — OFFICE (OUTPATIENT)
Dept: URBAN - METROPOLITAN AREA CLINIC 38 | Facility: CLINIC | Age: 83
Setting detail: OPHTHALMOLOGY
End: 2023-09-05
Payer: MEDICARE

## 2023-09-05 VITALS — HEIGHT: 60 IN

## 2023-09-05 DIAGNOSIS — S05.92XD: ICD-10-CM

## 2023-09-05 DIAGNOSIS — H35.363: ICD-10-CM

## 2023-09-05 DIAGNOSIS — H02.011: ICD-10-CM

## 2023-09-05 DIAGNOSIS — H11.32: ICD-10-CM

## 2023-09-05 DIAGNOSIS — H02.014: ICD-10-CM

## 2023-09-05 DIAGNOSIS — H02.012: ICD-10-CM

## 2023-09-05 DIAGNOSIS — H02.015: ICD-10-CM

## 2023-09-05 PROCEDURE — 67820 REVISE EYELASHES: CPT | Performed by: OPTOMETRIST

## 2023-09-05 PROCEDURE — 99213 OFFICE O/P EST LOW 20 MIN: CPT | Performed by: OPTOMETRIST

## 2023-09-05 ASSESSMENT — REFRACTION_CURRENTRX
OD_SPHERE: -0.25
OS_SPHERE: -0.50
OD_OVR_VA: 20/
OS_CYLINDER: +1.00
OD_CYLINDER: +0.50
OS_CYLINDER: +1.25
OS_ADD: +2.50
OS_VPRISM_DIRECTION: PROGS
OD_SPHERE: +2.25
OS_AXIS: 166
OD_AXIS: 010
OD_OVR_VA: 20/
OD_VPRISM_DIRECTION: PROGS
OS_VPRISM_DIRECTION: SV
OS_OVR_VA: 20/
OS_AXIS: 161
OD_CYLINDER: +0.50
OD_AXIS: 005
OD_VPRISM_DIRECTION: SV
OD_ADD: +2.50
OS_OVR_VA: 20/
OS_SPHERE: +1.75

## 2023-09-05 ASSESSMENT — LID EXAM ASSESSMENTS
OD_TRICHIASIS: RUL
OS_TRICHIASIS: LLL LUL

## 2023-09-05 ASSESSMENT — REFRACTION_AUTOREFRACTION
OD_AXIS: 031
OD_CYLINDER: +0.50
OS_AXIS: 164
OD_SPHERE: -1.00
OS_CYLINDER: +1.75
OS_SPHERE: -2.25

## 2023-09-05 ASSESSMENT — LID POSITION - COMMENTS
OS_COMMENTS: HORIZONTAL LID LAXITY
OD_COMMENTS: HORIZONTAL LID LAXITY

## 2023-09-05 ASSESSMENT — LID POSITION - ENTROPION
OS_ENTROPION: LLL LUL 2+
OD_ENTROPION: RLL RUL 2+

## 2023-09-05 ASSESSMENT — AXIALLENGTH_DERIVED
OS_AL: 25.1936
OD_AL: 24.8678

## 2023-09-05 ASSESSMENT — PACHYMETRY
OD_CT_CORRECTION: 3
OD_CT_UM: 509
OS_CT_CORRECTION: 2
OS_CT_UM: 510

## 2023-09-05 ASSESSMENT — KERATOMETRY
OD_K2POWER_DIOPTERS: 41.50
METHOD_AUTO_MANUAL: AUTO
OS_K1POWER_DIOPTERS: 39.50
OD_K1POWER_DIOPTERS: 40.50
OS_AXISANGLE_DEGREES: 174
OS_K2POWER_DIOPTERS: 42.25
OD_AXISANGLE_DEGREES: 009

## 2023-09-05 ASSESSMENT — SUPERFICIAL PUNCTATE KERATITIS (SPK)
OS_SPK: 3+
OD_SPK: 3+

## 2023-09-05 ASSESSMENT — CONFRONTATIONAL VISUAL FIELD TEST (CVF)
OS_FINDINGS: FULL
OD_FINDINGS: FULL

## 2023-09-05 ASSESSMENT — LID POSITION - PTOSIS
OS_PTOSIS: LUL 2+
OD_PTOSIS: RUL 2+

## 2023-09-05 ASSESSMENT — SPHEQUIV_DERIVED
OS_SPHEQUIV: -1.375
OD_SPHEQUIV: -0.75

## 2023-09-05 ASSESSMENT — VISUAL ACUITY
OD_BCVA: 20/50
OS_BCVA: 20/30

## 2023-09-05 ASSESSMENT — TONOMETRY: OD_IOP_MMHG: 18

## 2023-09-16 ENCOUNTER — OFFICE (OUTPATIENT)
Dept: URBAN - METROPOLITAN AREA CLINIC 38 | Facility: CLINIC | Age: 83
Setting detail: OPHTHALMOLOGY
End: 2023-09-16
Payer: MEDICARE

## 2023-09-16 DIAGNOSIS — H02.014: ICD-10-CM

## 2023-09-16 DIAGNOSIS — H02.89: ICD-10-CM

## 2023-09-16 DIAGNOSIS — H16.223: ICD-10-CM

## 2023-09-16 DIAGNOSIS — H02.031: ICD-10-CM

## 2023-09-16 DIAGNOSIS — H02.015: ICD-10-CM

## 2023-09-16 DIAGNOSIS — H02.011: ICD-10-CM

## 2023-09-16 DIAGNOSIS — H02.032: ICD-10-CM

## 2023-09-16 PROBLEM — S05.92XD: Status: ACTIVE | Noted: 2023-09-05

## 2023-09-16 PROBLEM — H51.11 CONVERGENCE INSUFFICIENCY OR PALSY ; BOTH EYES: Status: ACTIVE | Noted: 2023-09-05

## 2023-09-16 PROCEDURE — 99212 OFFICE O/P EST SF 10 MIN: CPT | Performed by: OPTOMETRIST

## 2023-09-16 ASSESSMENT — KERATOMETRY
OS_AXISANGLE_DEGREES: 174
OD_AXISANGLE_DEGREES: 009
OS_K1POWER_DIOPTERS: 39.50
OS_K2POWER_DIOPTERS: 42.25
OD_K1POWER_DIOPTERS: 40.50
OD_K2POWER_DIOPTERS: 41.50
METHOD_AUTO_MANUAL: AUTO

## 2023-09-16 ASSESSMENT — REFRACTION_CURRENTRX
OS_SPHERE: -0.50
OS_ADD: +2.50
OD_CYLINDER: +0.50
OS_CYLINDER: +1.00
OS_CYLINDER: +1.25
OD_AXIS: 005
OD_AXIS: 010
OD_CYLINDER: +0.50
OS_AXIS: 166
OS_VPRISM_DIRECTION: SV
OS_VPRISM_DIRECTION: PROGS
OD_OVR_VA: 20/
OD_VPRISM_DIRECTION: PROGS
OS_OVR_VA: 20/
OD_VPRISM_DIRECTION: SV
OD_ADD: +2.50
OD_SPHERE: -0.25
OD_SPHERE: +2.25
OD_OVR_VA: 20/
OS_AXIS: 161
OS_OVR_VA: 20/
OS_SPHERE: +1.75

## 2023-09-16 ASSESSMENT — LID EXAM ASSESSMENTS
OD_TRICHIASIS: RUL
OS_TRICHIASIS: LLL LUL

## 2023-09-16 ASSESSMENT — VISUAL ACUITY
OD_BCVA: 20/30-1
OS_BCVA: 20/30

## 2023-09-16 ASSESSMENT — REFRACTION_AUTOREFRACTION
OS_SPHERE: -2.25
OS_AXIS: 164
OD_CYLINDER: +0.50
OD_AXIS: 031
OS_CYLINDER: +1.75
OD_SPHERE: -1.00

## 2023-09-16 ASSESSMENT — LID POSITION - ENTROPION
OD_ENTROPION: RLL RUL 2+
OS_ENTROPION: LLL LUL 2+

## 2023-09-16 ASSESSMENT — SPHEQUIV_DERIVED
OD_SPHEQUIV: -0.75
OS_SPHEQUIV: -1.375

## 2023-09-16 ASSESSMENT — LID POSITION - PTOSIS
OS_PTOSIS: LUL 2+
OD_PTOSIS: RUL 2+

## 2023-09-16 ASSESSMENT — SUPERFICIAL PUNCTATE KERATITIS (SPK)
OD_SPK: 3+
OS_SPK: 3+

## 2023-09-16 ASSESSMENT — LID POSITION - COMMENTS
OD_COMMENTS: HORIZONTAL LID LAXITY
OS_COMMENTS: HORIZONTAL LID LAXITY

## 2023-09-16 ASSESSMENT — AXIALLENGTH_DERIVED
OD_AL: 24.8678
OS_AL: 25.1936

## 2023-09-16 ASSESSMENT — CONFRONTATIONAL VISUAL FIELD TEST (CVF)
OS_FINDINGS: FULL
OD_FINDINGS: FULL

## 2023-10-25 ENCOUNTER — APPOINTMENT (OUTPATIENT)
Dept: MRI IMAGING | Facility: CLINIC | Age: 83
End: 2023-10-25
Payer: MEDICARE

## 2023-10-25 PROCEDURE — 72148 MRI LUMBAR SPINE W/O DYE: CPT | Mod: MH

## 2023-12-05 ENCOUNTER — RX ONLY (RX ONLY)
Age: 83
End: 2023-12-05

## 2023-12-05 ENCOUNTER — OFFICE (OUTPATIENT)
Dept: URBAN - METROPOLITAN AREA CLINIC 38 | Facility: CLINIC | Age: 83
Setting detail: OPHTHALMOLOGY
End: 2023-12-05
Payer: MEDICARE

## 2023-12-05 DIAGNOSIS — H35.033: ICD-10-CM

## 2023-12-05 DIAGNOSIS — H16.223: ICD-10-CM

## 2023-12-05 DIAGNOSIS — H43.813: ICD-10-CM

## 2023-12-05 DIAGNOSIS — H02.031: ICD-10-CM

## 2023-12-05 DIAGNOSIS — H02.032: ICD-10-CM

## 2023-12-05 DIAGNOSIS — H43.393: ICD-10-CM

## 2023-12-05 DIAGNOSIS — H35.363: ICD-10-CM

## 2023-12-05 DIAGNOSIS — H52.4: ICD-10-CM

## 2023-12-05 DIAGNOSIS — H35.40: ICD-10-CM

## 2023-12-05 DIAGNOSIS — H02.89: ICD-10-CM

## 2023-12-05 PROCEDURE — 92250 FUNDUS PHOTOGRAPHY W/I&R: CPT | Performed by: OPTOMETRIST

## 2023-12-05 PROCEDURE — 92015 DETERMINE REFRACTIVE STATE: CPT | Performed by: OPTOMETRIST

## 2023-12-05 PROCEDURE — 92014 COMPRE OPH EXAM EST PT 1/>: CPT | Performed by: OPTOMETRIST

## 2023-12-05 ASSESSMENT — REFRACTION_MANIFEST
OS_SPHERE: +0.25
OD_ADD: +3.00
OS_ADD: +3.00
OD_AXIS: 083
OS_CYLINDER: -2.25
OD_SPHERE: -0.25
OS_VA1: 20/30
OD_VA1: 20/25
OD_CYLINDER: -0.50
OS_AXIS: 083

## 2023-12-05 ASSESSMENT — REFRACTION_CURRENTRX
OS_AXIS: 067
OD_ADD: +2.75
OS_VPRISM_DIRECTION: SV
OS_ADD: +2.75
OS_CYLINDER: -+1.00
OD_VPRISM_DIRECTION: SV
OD_VPRISM_DIRECTION: PROGS
OS_CYLINDER: -1.00
OS_VPRISM_DIRECTION: PROGS
OD_CYLINDER: -0.50
OS_OVR_VA: 20/
OD_AXIS: 104
OD_SPHERE: PLANO
OD_OVR_VA: 20/
OS_SPHERE: -0.75
OD_AXIS: 098
OD_SPHERE: +2.75
OD_OVR_VA: 20/
OS_AXIS: 070
OD_CYLINDER: -0.50
OS_SPHERE: +2.75
OS_OVR_VA: 20/

## 2023-12-05 ASSESSMENT — LID EXAM ASSESSMENTS
OS_TRICHIASIS: LLL LUL
OD_TRICHIASIS: RUL

## 2023-12-05 ASSESSMENT — LID POSITION - ENTROPION
OS_ENTROPION: LLL LUL 2+
OD_ENTROPION: RLL RUL 2+

## 2023-12-05 ASSESSMENT — SPHEQUIV_DERIVED
OD_SPHEQUIV: -0.5
OS_SPHEQUIV: -0.875
OS_SPHEQUIV: -0.875
OD_SPHEQUIV: -0.5

## 2023-12-05 ASSESSMENT — CONFRONTATIONAL VISUAL FIELD TEST (CVF)
OD_FINDINGS: FULL
OS_FINDINGS: FULL

## 2023-12-05 ASSESSMENT — REFRACTION_AUTOREFRACTION
OD_CYLINDER: -0.50
OD_SPHERE: -0.25
OS_AXIS: 083
OS_CYLINDER: -2.25
OS_SPHERE: +0.25
OD_AXIS: 083

## 2023-12-05 ASSESSMENT — LID POSITION - PTOSIS
OD_PTOSIS: RUL 2+
OS_PTOSIS: LUL 2+

## 2023-12-05 ASSESSMENT — LID POSITION - COMMENTS
OD_COMMENTS: HORIZONTAL LID LAXITY
OS_COMMENTS: HORIZONTAL LID LAXITY

## 2023-12-05 ASSESSMENT — SUPERFICIAL PUNCTATE KERATITIS (SPK)
OS_SPK: 3+
OD_SPK: 3+

## 2023-12-12 ENCOUNTER — TRANSCRIPTION ENCOUNTER (OUTPATIENT)
Age: 83
End: 2023-12-12

## 2024-02-02 ENCOUNTER — APPOINTMENT (OUTPATIENT)
Dept: ULTRASOUND IMAGING | Facility: CLINIC | Age: 84
End: 2024-02-02
Payer: MEDICARE

## 2024-02-02 PROCEDURE — 93971 EXTREMITY STUDY: CPT | Mod: RT

## 2024-02-09 PROBLEM — Z96.1 PSEUDOPHAKIA: Status: ACTIVE | Noted: 2024-02-09

## 2024-03-12 ENCOUNTER — APPOINTMENT (OUTPATIENT)
Dept: OPHTHALMOLOGY | Facility: CLINIC | Age: 84
End: 2024-03-12
Payer: MEDICARE

## 2024-03-12 ENCOUNTER — NON-APPOINTMENT (OUTPATIENT)
Age: 84
End: 2024-03-12

## 2024-03-12 PROCEDURE — 92133 CPTRZD OPH DX IMG PST SGM ON: CPT

## 2024-03-12 PROCEDURE — 92004 COMPRE OPH EXAM NEW PT 1/>: CPT

## 2024-03-12 PROCEDURE — 76514 ECHO EXAM OF EYE THICKNESS: CPT

## 2024-04-09 ENCOUNTER — APPOINTMENT (OUTPATIENT)
Dept: OPHTHALMOLOGY | Facility: CLINIC | Age: 84
End: 2024-04-09
Payer: MEDICARE

## 2024-04-09 ENCOUNTER — NON-APPOINTMENT (OUTPATIENT)
Age: 84
End: 2024-04-09

## 2024-04-09 PROCEDURE — 92012 INTRM OPH EXAM EST PATIENT: CPT

## 2024-05-18 ENCOUNTER — RX ONLY (RX ONLY)
Age: 84
End: 2024-05-18

## 2024-05-18 ENCOUNTER — OFFICE (OUTPATIENT)
Dept: URBAN - METROPOLITAN AREA CLINIC 38 | Facility: CLINIC | Age: 84
Setting detail: OPHTHALMOLOGY
End: 2024-05-18
Payer: MEDICARE

## 2024-05-18 DIAGNOSIS — H43.813: ICD-10-CM

## 2024-05-18 DIAGNOSIS — H35.033: ICD-10-CM

## 2024-05-18 DIAGNOSIS — H40.053: ICD-10-CM

## 2024-05-18 PROBLEM — H51.11 CONVERGENCE INSUFFICIENCY OR PALSY ; BOTH EYES: Status: ACTIVE | Noted: 2024-05-18

## 2024-05-18 PROBLEM — H02.403 PTOSIS OF EYELID, UNSPECIFIED; BOTH EYES: Status: ACTIVE | Noted: 2024-05-18

## 2024-05-18 PROBLEM — L25.9 CONTACT DERMATITIS, UNSPECIFIED CAUSE ; BOTH EYES: Status: ACTIVE | Noted: 2024-05-18

## 2024-05-18 PROCEDURE — 92014 COMPRE OPH EXAM EST PT 1/>: CPT | Performed by: OPTOMETRIST

## 2024-05-18 PROCEDURE — 92133 CPTRZD OPH DX IMG PST SGM ON: CPT | Performed by: OPTOMETRIST

## 2024-05-18 ASSESSMENT — LID EXAM ASSESSMENTS
OS_TRICHIASIS: LLL LUL
OD_TRICHIASIS: RUL

## 2024-05-18 ASSESSMENT — LID POSITION - ENTROPION
OS_ENTROPION: LLL LUL 2+
OD_ENTROPION: RLL RUL 2+

## 2024-05-18 ASSESSMENT — LID POSITION - PTOSIS
OD_PTOSIS: RUL 2+
OS_PTOSIS: LUL 2+

## 2024-05-18 ASSESSMENT — LID POSITION - COMMENTS
OS_COMMENTS: HORIZONTAL LID LAXITY
OD_COMMENTS: HORIZONTAL LID LAXITY

## 2024-05-18 ASSESSMENT — CONFRONTATIONAL VISUAL FIELD TEST (CVF)
OD_FINDINGS: FULL
OS_FINDINGS: FULL

## 2024-05-20 ENCOUNTER — APPOINTMENT (OUTPATIENT)
Dept: OPHTHALMOLOGY | Facility: CLINIC | Age: 84
End: 2024-05-20
Payer: MEDICARE

## 2024-05-20 PROCEDURE — 99214 OFFICE O/P EST MOD 30 MIN: CPT

## 2024-05-20 PROCEDURE — 92285 EXTERNAL OCULAR PHOTOGRAPHY: CPT

## 2024-05-20 PROCEDURE — 92083 EXTENDED VISUAL FIELD XM: CPT

## 2024-05-24 ENCOUNTER — APPOINTMENT (OUTPATIENT)
Dept: CT IMAGING | Facility: CLINIC | Age: 84
End: 2024-05-24
Payer: MEDICARE

## 2024-05-24 PROCEDURE — 71250 CT THORAX DX C-: CPT | Mod: MH

## 2024-07-04 ENCOUNTER — NON-APPOINTMENT (OUTPATIENT)
Age: 84
End: 2024-07-04

## 2024-08-22 ENCOUNTER — APPOINTMENT (OUTPATIENT)
Dept: OPHTHALMOLOGY | Facility: CLINIC | Age: 84
End: 2024-08-22

## 2024-09-04 ENCOUNTER — APPOINTMENT (OUTPATIENT)
Dept: OPHTHALMOLOGY | Facility: CLINIC | Age: 84
End: 2024-09-04

## 2024-10-25 PROBLEM — Z96.1 PSEUDOPHAKIA: Status: ACTIVE | Noted: 2024-10-25

## 2024-11-30 ENCOUNTER — OFFICE (OUTPATIENT)
Dept: URBAN - METROPOLITAN AREA CLINIC 38 | Facility: CLINIC | Age: 84
Setting detail: OPHTHALMOLOGY
End: 2024-11-30
Payer: MEDICARE

## 2024-11-30 DIAGNOSIS — H40.053: ICD-10-CM

## 2024-11-30 DIAGNOSIS — H35.033: ICD-10-CM

## 2024-11-30 DIAGNOSIS — H43.813: ICD-10-CM

## 2024-11-30 PROBLEM — H51.11 CONVERGENCE INSUFFICIENCY OR PALSY ; BOTH EYES: Status: ACTIVE | Noted: 2024-11-30

## 2024-11-30 PROCEDURE — 99213 OFFICE O/P EST LOW 20 MIN: CPT | Performed by: OPTOMETRIST

## 2024-11-30 PROCEDURE — 92133 CPTRZD OPH DX IMG PST SGM ON: CPT | Performed by: OPTOMETRIST

## 2024-11-30 PROCEDURE — 92083 EXTENDED VISUAL FIELD XM: CPT | Performed by: OPTOMETRIST

## 2024-11-30 ASSESSMENT — REFRACTION_CURRENTRX
OD_VPRISM_DIRECTION: PROGS
OS_ADD: +2.75
OD_SPHERE: -0.25
OS_OVR_VA: 20/
OS_OVR_VA: 20/
OS_ADD: +2.75
OS_AXIS: 070
OD_ADD: +2.75
OD_AXIS: 098
OD_AXIS: 098
OS_VPRISM_DIRECTION: PROGS
OS_SPHERE: -0.75
OS_CYLINDER: -+1.00
OS_CYLINDER: -1.00
OD_OVR_VA: 20/
OD_CYLINDER: -0.50
OD_OVR_VA: 20/
OD_CYLINDER: -0.50
OS_SPHERE: -0.75
OD_SPHERE: PLANO
OD_ADD: +2.75
OS_AXIS: 067
OS_VPRISM_DIRECTION: PROGS
OD_VPRISM_DIRECTION: PROGS

## 2024-11-30 ASSESSMENT — REFRACTION_MANIFEST
OS_ADD: +3.00
OS_CYLINDER: +2.25
OD_SPHERE: -0.75
OD_AXIS: 173
OD_ADD: +3.00
OS_AXIS: 173
OS_SPHERE: -2.00
OS_VA1: 20/30
OD_CYLINDER: +0.50
OD_VA1: 20/25

## 2024-11-30 ASSESSMENT — CONFRONTATIONAL VISUAL FIELD TEST (CVF)
OD_FINDINGS: FULL
OS_FINDINGS: FULL

## 2024-11-30 ASSESSMENT — VISUAL ACUITY
OD_BCVA: 20/40-2
OS_BCVA: 20/25

## 2025-01-03 ENCOUNTER — RX ONLY (RX ONLY)
Age: 85
End: 2025-01-03

## 2025-01-03 ENCOUNTER — OFFICE (OUTPATIENT)
Dept: URBAN - METROPOLITAN AREA CLINIC 38 | Facility: CLINIC | Age: 85
Setting detail: OPHTHALMOLOGY
End: 2025-01-03
Payer: MEDICARE

## 2025-01-03 DIAGNOSIS — H40.1111: ICD-10-CM

## 2025-01-03 DIAGNOSIS — H40.1123: ICD-10-CM

## 2025-01-03 PROCEDURE — 92020 GONIOSCOPY: CPT | Performed by: STUDENT IN AN ORGANIZED HEALTH CARE EDUCATION/TRAINING PROGRAM

## 2025-01-03 PROCEDURE — 92133 CPTRZD OPH DX IMG PST SGM ON: CPT | Performed by: STUDENT IN AN ORGANIZED HEALTH CARE EDUCATION/TRAINING PROGRAM

## 2025-01-03 PROCEDURE — 99214 OFFICE O/P EST MOD 30 MIN: CPT | Performed by: STUDENT IN AN ORGANIZED HEALTH CARE EDUCATION/TRAINING PROGRAM

## 2025-01-03 ASSESSMENT — REFRACTION_CURRENTRX
OS_OVR_VA: 20/
OD_AXIS: 098
OD_SPHERE: PLANO
OD_AXIS: 098
OS_AXIS: 070
OD_CYLINDER: -0.50
OS_CYLINDER: -+1.00
OS_SPHERE: -0.75
OS_VPRISM_DIRECTION: PROGS
OS_VPRISM_DIRECTION: PROGS
OS_SPHERE: -0.75
OD_ADD: +2.75
OD_SPHERE: -0.25
OD_VPRISM_DIRECTION: PROGS
OD_CYLINDER: -0.50
OD_OVR_VA: 20/
OS_AXIS: 067
OD_ADD: +2.75
OS_CYLINDER: -1.00
OS_ADD: +2.75
OS_ADD: +2.75
OS_OVR_VA: 20/
OD_VPRISM_DIRECTION: PROGS
OD_OVR_VA: 20/

## 2025-01-03 ASSESSMENT — REFRACTION_AUTOREFRACTION
OS_CYLINDER: -2.75
OS_AXIS: 084
OS_SPHERE: +0.75
OD_AXIS: 105
OD_CYLINDER: -1.00
OD_SPHERE: +0.50

## 2025-01-03 ASSESSMENT — LID EXAM ASSESSMENTS
OS_TRICHIASIS: LLL LUL
OD_TRICHIASIS: RUL

## 2025-01-03 ASSESSMENT — CONFRONTATIONAL VISUAL FIELD TEST (CVF)
OD_FINDINGS: FULL
OS_FINDINGS: FULL

## 2025-01-03 ASSESSMENT — REFRACTION_MANIFEST
OS_VA1: 20/30
OD_ADD: +3.00
OD_CYLINDER: +0.50
OD_AXIS: 173
OD_SPHERE: -0.75
OS_AXIS: 173
OS_ADD: +3.00
OS_CYLINDER: +2.25
OS_SPHERE: -2.00
OD_VA1: 20/25

## 2025-01-03 ASSESSMENT — LID POSITION - COMMENTS
OS_COMMENTS: HORIZONTAL LID LAXITY
OD_COMMENTS: HORIZONTAL LID LAXITY

## 2025-01-03 ASSESSMENT — LID POSITION - ENTROPION
OS_ENTROPION: LLL LUL 2+
OD_ENTROPION: RLL RUL 2+

## 2025-01-03 ASSESSMENT — KERATOMETRY
OS_AXISANGLE_DEGREES: 084
OD_K1POWER_DIOPTERS: 41.00
OS_K1POWER_DIOPTERS: 40.50
OS_K2POWER_DIOPTERS: 42.75
OD_AXISANGLE_DEGREES: 013
OD_K2POWER_DIOPTERS: 41.50
METHOD_AUTO_MANUAL: AUTO

## 2025-01-03 ASSESSMENT — VISUAL ACUITY
OD_BCVA: 20/30-
OS_BCVA: 20/20-

## 2025-01-03 ASSESSMENT — LID POSITION - PTOSIS
OD_PTOSIS: RUL 2+
OS_PTOSIS: LUL 2+

## 2025-01-03 ASSESSMENT — SUPERFICIAL PUNCTATE KERATITIS (SPK)
OS_SPK: 3+
OD_SPK: 3+

## 2025-03-07 ENCOUNTER — RX ONLY (RX ONLY)
Age: 85
End: 2025-03-07

## 2025-03-07 ENCOUNTER — OFFICE (OUTPATIENT)
Dept: URBAN - METROPOLITAN AREA CLINIC 38 | Facility: CLINIC | Age: 85
Setting detail: OPHTHALMOLOGY
End: 2025-03-07
Payer: MEDICARE

## 2025-03-07 DIAGNOSIS — H35.033: ICD-10-CM

## 2025-03-07 DIAGNOSIS — H40.043: ICD-10-CM

## 2025-03-07 DIAGNOSIS — H35.363: ICD-10-CM

## 2025-03-07 DIAGNOSIS — H40.1111: ICD-10-CM

## 2025-03-07 DIAGNOSIS — H02.014: ICD-10-CM

## 2025-03-07 DIAGNOSIS — H02.011: ICD-10-CM

## 2025-03-07 DIAGNOSIS — H43.813: ICD-10-CM

## 2025-03-07 DIAGNOSIS — H40.1123: ICD-10-CM

## 2025-03-07 DIAGNOSIS — H35.40: ICD-10-CM

## 2025-03-07 DIAGNOSIS — H02.015: ICD-10-CM

## 2025-03-07 DIAGNOSIS — H16.223: ICD-10-CM

## 2025-03-07 PROCEDURE — 92014 COMPRE OPH EXAM EST PT 1/>: CPT | Performed by: STUDENT IN AN ORGANIZED HEALTH CARE EDUCATION/TRAINING PROGRAM

## 2025-03-07 PROCEDURE — 92250 FUNDUS PHOTOGRAPHY W/I&R: CPT | Performed by: STUDENT IN AN ORGANIZED HEALTH CARE EDUCATION/TRAINING PROGRAM

## 2025-03-07 ASSESSMENT — REFRACTION_MANIFEST
OS_SPHERE: -2.00
OS_AXIS: 173
OD_SPHERE: -0.75
OS_CYLINDER: +2.25
OD_ADD: +3.00
OD_VA1: 20/25
OD_AXIS: 173
OD_CYLINDER: +0.50
OS_ADD: +3.00
OS_VA1: 20/30

## 2025-03-07 ASSESSMENT — REFRACTION_AUTOREFRACTION
OS_SPHERE: +0.75
OD_AXIS: 105
OS_CYLINDER: -2.75
OD_SPHERE: +0.50
OD_CYLINDER: -1.00
OS_AXIS: 084

## 2025-03-07 ASSESSMENT — LID EXAM ASSESSMENTS
OS_TRICHIASIS: LLL LUL
OD_TRICHIASIS: RUL

## 2025-03-07 ASSESSMENT — REFRACTION_CURRENTRX
OD_SPHERE: PLANO
OS_VPRISM_DIRECTION: PROGS
OD_ADD: +2.75
OD_CYLINDER: -0.50
OD_VPRISM_DIRECTION: PROGS
OS_OVR_VA: 20/
OS_VPRISM_DIRECTION: PROGS
OS_ADD: +2.75
OD_CYLINDER: -0.50
OS_ADD: +2.75
OS_AXIS: 067
OD_ADD: +2.75
OS_SPHERE: -0.75
OD_AXIS: 098
OS_AXIS: 070
OS_OVR_VA: 20/
OD_OVR_VA: 20/
OS_SPHERE: -0.75
OD_SPHERE: -0.25
OD_OVR_VA: 20/
OD_VPRISM_DIRECTION: PROGS
OS_CYLINDER: -1.00
OD_AXIS: 098
OS_CYLINDER: -+1.00

## 2025-03-07 ASSESSMENT — CONFRONTATIONAL VISUAL FIELD TEST (CVF)
OD_FINDINGS: FULL
OS_FINDINGS: FULL

## 2025-03-07 ASSESSMENT — KERATOMETRY
OS_K2POWER_DIOPTERS: 42.75
OD_AXISANGLE_DEGREES: 013
METHOD_AUTO_MANUAL: AUTO
OS_K1POWER_DIOPTERS: 40.50
OS_AXISANGLE_DEGREES: 084
OD_K1POWER_DIOPTERS: 41.00
OD_K2POWER_DIOPTERS: 41.50

## 2025-03-07 ASSESSMENT — LID POSITION - PTOSIS
OS_PTOSIS: LUL 2+
OD_PTOSIS: RUL 2+

## 2025-03-07 ASSESSMENT — VISUAL ACUITY
OD_BCVA: 20/40-2
OS_BCVA: 20/20-

## 2025-03-07 ASSESSMENT — LID POSITION - COMMENTS
OD_COMMENTS: HORIZONTAL LID LAXITY
OS_COMMENTS: HORIZONTAL LID LAXITY

## 2025-03-07 ASSESSMENT — SUPERFICIAL PUNCTATE KERATITIS (SPK)
OS_SPK: 3+
OD_SPK: 3+

## 2025-03-07 ASSESSMENT — LID POSITION - ENTROPION
OS_ENTROPION: LLL LUL 2+
OD_ENTROPION: RLL RUL 2+

## 2025-03-12 ENCOUNTER — OFFICE (OUTPATIENT)
Dept: URBAN - METROPOLITAN AREA CLINIC 12 | Facility: CLINIC | Age: 85
Setting detail: OPHTHALMOLOGY
End: 2025-03-12
Payer: MEDICARE

## 2025-03-12 DIAGNOSIS — H40.1123: ICD-10-CM

## 2025-03-12 PROCEDURE — 65855 TRABECULOPLASTY LASER SURG: CPT | Mod: LT | Performed by: STUDENT IN AN ORGANIZED HEALTH CARE EDUCATION/TRAINING PROGRAM

## 2025-03-12 ASSESSMENT — REFRACTION_MANIFEST
OD_AXIS: 173
OD_ADD: +3.00
OS_CYLINDER: +2.25
OD_VA1: 20/25
OD_SPHERE: -0.75
OD_CYLINDER: +0.50
OS_ADD: +3.00
OS_SPHERE: -2.00
OS_VA1: 20/30
OS_AXIS: 173

## 2025-03-12 ASSESSMENT — REFRACTION_CURRENTRX
OS_VPRISM_DIRECTION: PROGS
OS_VPRISM_DIRECTION: PROGS
OD_AXIS: 098
OS_AXIS: 067
OD_SPHERE: PLANO
OD_AXIS: 098
OD_ADD: +2.75
OS_SPHERE: -0.75
OS_SPHERE: -0.75
OD_CYLINDER: -0.50
OS_OVR_VA: 20/
OD_ADD: +2.75
OD_CYLINDER: -0.50
OD_VPRISM_DIRECTION: PROGS
OD_VPRISM_DIRECTION: PROGS
OS_OVR_VA: 20/
OS_CYLINDER: -+1.00
OD_SPHERE: -0.25
OS_ADD: +2.75
OD_OVR_VA: 20/
OD_OVR_VA: 20/
OS_AXIS: 070
OS_CYLINDER: -1.00
OS_ADD: +2.75

## 2025-03-12 ASSESSMENT — KERATOMETRY
OS_K1POWER_DIOPTERS: 40.25
OD_K1POWER_DIOPTERS: 41.00
OS_AXISANGLE_DEGREES: 177
OD_AXISANGLE_DEGREES: 023
OS_K2POWER_DIOPTERS: 42.75
METHOD_AUTO_MANUAL: AUTO
OD_K2POWER_DIOPTERS: 41.50

## 2025-03-12 ASSESSMENT — PACHYMETRY
OS_CT_UM: 510
OS_CT_CORRECTION: 2
OD_CT_UM: 509
OD_CT_CORRECTION: 3

## 2025-03-12 ASSESSMENT — REFRACTION_AUTOREFRACTION
OD_SPHERE: +0.25
OS_SPHERE: +1.00
OS_CYLINDER: -2.75
OD_AXIS: 103
OS_AXIS: 086
OD_CYLINDER: -1.00

## 2025-03-12 ASSESSMENT — LID EXAM ASSESSMENTS
OD_TRICHIASIS: RUL
OS_TRICHIASIS: LLL LUL

## 2025-03-12 ASSESSMENT — VISUAL ACUITY
OS_BCVA: 20/20-
OD_BCVA: 20/60-2

## 2025-03-12 ASSESSMENT — SUPERFICIAL PUNCTATE KERATITIS (SPK)
OD_SPK: 3+
OS_SPK: 3+

## 2025-03-12 ASSESSMENT — LID POSITION - PTOSIS
OD_PTOSIS: RUL 2+
OS_PTOSIS: LUL 2+

## 2025-03-12 ASSESSMENT — LID POSITION - COMMENTS
OS_COMMENTS: HORIZONTAL LID LAXITY
OD_COMMENTS: HORIZONTAL LID LAXITY

## 2025-03-12 ASSESSMENT — CONFRONTATIONAL VISUAL FIELD TEST (CVF)
OD_FINDINGS: FULL
OS_FINDINGS: FULL

## 2025-03-12 ASSESSMENT — TONOMETRY: OS_IOP_MMHG: 10

## 2025-03-12 ASSESSMENT — LID POSITION - ENTROPION
OD_ENTROPION: RLL RUL 2+
OS_ENTROPION: LLL LUL 2+

## 2025-03-21 ENCOUNTER — OFFICE (OUTPATIENT)
Dept: URBAN - METROPOLITAN AREA CLINIC 38 | Facility: CLINIC | Age: 85
Setting detail: OPHTHALMOLOGY
End: 2025-03-21
Payer: MEDICARE

## 2025-03-21 ENCOUNTER — RX ONLY (RX ONLY)
Age: 85
End: 2025-03-21

## 2025-03-21 DIAGNOSIS — H40.1111: ICD-10-CM

## 2025-03-21 PROCEDURE — 65855 TRABECULOPLASTY LASER SURG: CPT | Mod: 79,RT | Performed by: STUDENT IN AN ORGANIZED HEALTH CARE EDUCATION/TRAINING PROGRAM

## 2025-03-21 ASSESSMENT — REFRACTION_MANIFEST
OS_CYLINDER: +2.25
OD_CYLINDER: +0.50
OD_SPHERE: -0.75
OS_ADD: +3.00
OS_AXIS: 173
OD_AXIS: 173
OS_SPHERE: -2.00
OD_VA1: 20/25
OD_ADD: +3.00
OS_VA1: 20/30

## 2025-03-21 ASSESSMENT — REFRACTION_CURRENTRX
OD_SPHERE: -0.25
OD_VPRISM_DIRECTION: PROGS
OS_AXIS: 070
OS_AXIS: 067
OS_OVR_VA: 20/
OS_SPHERE: -0.75
OS_VPRISM_DIRECTION: PROGS
OD_CYLINDER: -0.50
OS_ADD: +2.75
OS_CYLINDER: -+1.00
OD_CYLINDER: -0.50
OD_SPHERE: PLANO
OS_CYLINDER: -1.00
OD_ADD: +2.75
OD_VPRISM_DIRECTION: PROGS
OD_OVR_VA: 20/
OD_AXIS: 098
OD_AXIS: 098
OS_OVR_VA: 20/
OS_SPHERE: -0.75
OD_OVR_VA: 20/
OD_ADD: +2.75
OS_ADD: +2.75
OS_VPRISM_DIRECTION: PROGS

## 2025-03-21 ASSESSMENT — CONFRONTATIONAL VISUAL FIELD TEST (CVF)
OS_FINDINGS: FULL
OD_FINDINGS: FULL

## 2025-03-21 ASSESSMENT — REFRACTION_AUTOREFRACTION
OD_SPHERE: +0.25
OD_CYLINDER: -1.00
OS_SPHERE: +1.00
OD_AXIS: 103
OS_CYLINDER: -2.75
OS_AXIS: 086

## 2025-03-21 ASSESSMENT — KERATOMETRY
OS_AXISANGLE_DEGREES: 177
OD_AXISANGLE_DEGREES: 023
OS_K2POWER_DIOPTERS: 42.75
OD_K2POWER_DIOPTERS: 41.50
METHOD_AUTO_MANUAL: AUTO
OS_K1POWER_DIOPTERS: 40.25
OD_K1POWER_DIOPTERS: 41.00

## 2025-03-21 ASSESSMENT — VISUAL ACUITY
OS_BCVA: 20/20-
OD_BCVA: 20/60-2

## 2025-04-04 ENCOUNTER — OFFICE (OUTPATIENT)
Dept: URBAN - METROPOLITAN AREA CLINIC 38 | Facility: CLINIC | Age: 85
Setting detail: OPHTHALMOLOGY
End: 2025-04-04
Payer: MEDICARE

## 2025-04-04 DIAGNOSIS — H40.043: ICD-10-CM

## 2025-04-04 DIAGNOSIS — H40.1123: ICD-10-CM

## 2025-04-04 DIAGNOSIS — H40.1111: ICD-10-CM

## 2025-04-04 PROCEDURE — 99213 OFFICE O/P EST LOW 20 MIN: CPT | Performed by: STUDENT IN AN ORGANIZED HEALTH CARE EDUCATION/TRAINING PROGRAM

## 2025-04-04 ASSESSMENT — REFRACTION_CURRENTRX
OD_CYLINDER: -0.50
OD_CYLINDER: -0.50
OD_VPRISM_DIRECTION: PROGS
OD_AXIS: 098
OS_VPRISM_DIRECTION: PROGS
OS_SPHERE: -0.75
OS_ADD: +2.75
OD_OVR_VA: 20/
OS_AXIS: 067
OD_AXIS: 098
OS_OVR_VA: 20/
OD_OVR_VA: 20/
OS_OVR_VA: 20/
OS_VPRISM_DIRECTION: PROGS
OS_CYLINDER: -+1.00
OD_ADD: +2.75
OD_SPHERE: PLANO
OD_VPRISM_DIRECTION: PROGS
OS_CYLINDER: -1.00
OS_AXIS: 070
OS_SPHERE: -0.75
OS_ADD: +2.75
OD_SPHERE: -0.25
OD_ADD: +2.75

## 2025-04-04 ASSESSMENT — LID POSITION - COMMENTS
OS_COMMENTS: HORIZONTAL LID LAXITY
OD_COMMENTS: HORIZONTAL LID LAXITY

## 2025-04-04 ASSESSMENT — KERATOMETRY
OD_K2POWER_DIOPTERS: 41.75
OS_K1POWER_DIOPTERS: 41.00
METHOD_AUTO_MANUAL: AUTO
OS_AXISANGLE_DEGREES: 169
OD_K1POWER_DIOPTERS: 41.00
OS_K2POWER_DIOPTERS: 42.75
OD_AXISANGLE_DEGREES: 013

## 2025-04-04 ASSESSMENT — REFRACTION_MANIFEST
OS_VA1: 20/30
OS_CYLINDER: +2.25
OD_AXIS: 173
OS_AXIS: 173
OS_SPHERE: -2.00
OS_ADD: +3.00
OD_ADD: +3.00
OD_VA1: 20/25
OD_SPHERE: -0.75
OD_CYLINDER: +0.50

## 2025-04-04 ASSESSMENT — REFRACTION_AUTOREFRACTION
OS_AXIS: 079
OS_CYLINDER: -2.25
OD_CYLINDER: -1.00
OD_SPHERE: +0.50
OD_AXIS: 116
OS_SPHERE: +0.50

## 2025-04-04 ASSESSMENT — LID POSITION - PTOSIS
OS_PTOSIS: LUL 2+
OD_PTOSIS: RUL 2+

## 2025-04-04 ASSESSMENT — CONFRONTATIONAL VISUAL FIELD TEST (CVF)
OS_FINDINGS: FULL
OD_FINDINGS: FULL

## 2025-04-04 ASSESSMENT — LID EXAM ASSESSMENTS
OD_TRICHIASIS: RUL
OS_TRICHIASIS: LLL LUL

## 2025-04-04 ASSESSMENT — VISUAL ACUITY
OD_BCVA: 20/40-
OS_BCVA: 20/25-

## 2025-04-04 ASSESSMENT — SUPERFICIAL PUNCTATE KERATITIS (SPK)
OS_SPK: 3+
OD_SPK: 3+

## 2025-04-04 ASSESSMENT — PACHYMETRY
OD_CT_UM: 509
OS_CT_CORRECTION: 2
OD_CT_CORRECTION: 3
OS_CT_UM: 510

## 2025-04-04 ASSESSMENT — LID POSITION - ENTROPION
OD_ENTROPION: RLL RUL 2+
OS_ENTROPION: LLL LUL 2+

## 2025-05-02 ENCOUNTER — TRANSCRIPTION ENCOUNTER (OUTPATIENT)
Age: 85
End: 2025-05-02

## 2025-05-09 ENCOUNTER — TRANSCRIPTION ENCOUNTER (OUTPATIENT)
Age: 85
End: 2025-05-09

## 2025-05-12 ENCOUNTER — APPOINTMENT (OUTPATIENT)
Dept: UROLOGY | Facility: CLINIC | Age: 85
End: 2025-05-12

## 2025-05-12 PROCEDURE — 99215 OFFICE O/P EST HI 40 MIN: CPT | Mod: 24

## 2025-05-19 ENCOUNTER — NON-APPOINTMENT (OUTPATIENT)
Age: 85
End: 2025-05-19

## 2025-05-21 ENCOUNTER — APPOINTMENT (OUTPATIENT)
Dept: UROLOGY | Facility: CLINIC | Age: 85
End: 2025-05-21

## 2025-05-21 DIAGNOSIS — N13.8 BENIGN PROSTATIC HYPERPLASIA WITH LOWER URINARY TRACT SYMPMS: ICD-10-CM

## 2025-05-21 DIAGNOSIS — N13.30 UNSPECIFIED HYDRONEPHROSIS: ICD-10-CM

## 2025-05-21 DIAGNOSIS — R33.9 RETENTION OF URINE, UNSPECIFIED: ICD-10-CM

## 2025-05-21 DIAGNOSIS — N20.0 CALCULUS OF KIDNEY: ICD-10-CM

## 2025-05-21 DIAGNOSIS — N40.1 BENIGN PROSTATIC HYPERPLASIA WITH LOWER URINARY TRACT SYMPMS: ICD-10-CM

## 2025-05-21 PROCEDURE — 99214 OFFICE O/P EST MOD 30 MIN: CPT | Mod: 24,93

## 2025-05-23 ENCOUNTER — APPOINTMENT (OUTPATIENT)
Dept: UROLOGY | Facility: CLINIC | Age: 85
End: 2025-05-23

## 2025-05-23 PROCEDURE — 51702 INSERT TEMP BLADDER CATH: CPT | Mod: 58

## 2025-05-28 ENCOUNTER — APPOINTMENT (OUTPATIENT)
Dept: UROLOGY | Facility: CLINIC | Age: 85
End: 2025-05-28

## 2025-05-29 DIAGNOSIS — N32.89 OTHER SPECIFIED DISORDERS OF BLADDER: ICD-10-CM

## 2025-05-29 RX ORDER — OXYBUTYNIN CHLORIDE 5 MG/1
5 TABLET ORAL
Qty: 180 | Refills: 3 | Status: ACTIVE | COMMUNITY
Start: 2025-05-29 | End: 1900-01-01

## 2025-06-02 ENCOUNTER — OUTPATIENT (OUTPATIENT)
Dept: OUTPATIENT SERVICES | Facility: HOSPITAL | Age: 85
LOS: 1 days | End: 2025-06-02
Payer: MEDICARE

## 2025-06-02 VITALS
WEIGHT: 201.94 LBS | SYSTOLIC BLOOD PRESSURE: 110 MMHG | DIASTOLIC BLOOD PRESSURE: 66 MMHG | OXYGEN SATURATION: 98 % | RESPIRATION RATE: 20 BRPM | HEIGHT: 72 IN | HEART RATE: 68 BPM | TEMPERATURE: 98 F

## 2025-06-02 DIAGNOSIS — N40.0 BENIGN PROSTATIC HYPERPLASIA WITHOUT LOWER URINARY TRACT SYMPTOMS: ICD-10-CM

## 2025-06-02 DIAGNOSIS — Z96.652 PRESENCE OF LEFT ARTIFICIAL KNEE JOINT: Chronic | ICD-10-CM

## 2025-06-02 DIAGNOSIS — Z98.890 OTHER SPECIFIED POSTPROCEDURAL STATES: Chronic | ICD-10-CM

## 2025-06-02 DIAGNOSIS — G47.33 OBSTRUCTIVE SLEEP APNEA (ADULT) (PEDIATRIC): ICD-10-CM

## 2025-06-02 DIAGNOSIS — I48.0 PAROXYSMAL ATRIAL FIBRILLATION: ICD-10-CM

## 2025-06-02 DIAGNOSIS — Z01.818 ENCOUNTER FOR OTHER PREPROCEDURAL EXAMINATION: ICD-10-CM

## 2025-06-02 DIAGNOSIS — N20.0 CALCULUS OF KIDNEY: ICD-10-CM

## 2025-06-02 DIAGNOSIS — D64.9 ANEMIA, UNSPECIFIED: ICD-10-CM

## 2025-06-02 DIAGNOSIS — Z98.49 CATARACT EXTRACTION STATUS, UNSPECIFIED EYE: Chronic | ICD-10-CM

## 2025-06-02 LAB
ANION GAP SERPL CALC-SCNC: 14 MMOL/L — SIGNIFICANT CHANGE UP (ref 5–17)
BLD GP AB SCN SERPL QL: POSITIVE — SIGNIFICANT CHANGE UP
BUN SERPL-MCNC: 24 MG/DL — HIGH (ref 7–23)
CALCIUM SERPL-MCNC: 9 MG/DL — SIGNIFICANT CHANGE UP (ref 8.4–10.5)
CHLORIDE SERPL-SCNC: 100 MMOL/L — SIGNIFICANT CHANGE UP (ref 96–108)
CO2 SERPL-SCNC: 23 MMOL/L — SIGNIFICANT CHANGE UP (ref 22–31)
CREAT SERPL-MCNC: 1.03 MG/DL — SIGNIFICANT CHANGE UP (ref 0.5–1.3)
EGFR: 72 ML/MIN/1.73M2 — SIGNIFICANT CHANGE UP
EGFR: 72 ML/MIN/1.73M2 — SIGNIFICANT CHANGE UP
GLUCOSE SERPL-MCNC: 84 MG/DL — SIGNIFICANT CHANGE UP (ref 70–99)
HCT VFR BLD CALC: 23.7 % — LOW (ref 39–50)
HGB BLD-MCNC: 7.5 G/DL — LOW (ref 13–17)
MCHC RBC-ENTMCNC: 27.5 PG — SIGNIFICANT CHANGE UP (ref 27–34)
MCHC RBC-ENTMCNC: 31.6 G/DL — LOW (ref 32–36)
MCV RBC AUTO: 86.8 FL — SIGNIFICANT CHANGE UP (ref 80–100)
NRBC BLD AUTO-RTO: 0 /100 WBCS — SIGNIFICANT CHANGE UP (ref 0–0)
PLATELET # BLD AUTO: 175 K/UL — SIGNIFICANT CHANGE UP (ref 150–400)
POTASSIUM SERPL-MCNC: 3 MMOL/L — LOW (ref 3.5–5.3)
POTASSIUM SERPL-SCNC: 3 MMOL/L — LOW (ref 3.5–5.3)
RBC # BLD: 2.73 M/UL — LOW (ref 4.2–5.8)
RBC # FLD: 19 % — HIGH (ref 10.3–14.5)
RH IG SCN BLD-IMP: NEGATIVE — SIGNIFICANT CHANGE UP
SODIUM SERPL-SCNC: 137 MMOL/L — SIGNIFICANT CHANGE UP (ref 135–145)
WBC # BLD: 4.42 K/UL — SIGNIFICANT CHANGE UP (ref 3.8–10.5)
WBC # FLD AUTO: 4.42 K/UL — SIGNIFICANT CHANGE UP (ref 3.8–10.5)

## 2025-06-02 PROCEDURE — 87086 URINE CULTURE/COLONY COUNT: CPT

## 2025-06-02 PROCEDURE — 86850 RBC ANTIBODY SCREEN: CPT

## 2025-06-02 PROCEDURE — G0463: CPT

## 2025-06-02 PROCEDURE — 86870 RBC ANTIBODY IDENTIFICATION: CPT

## 2025-06-02 PROCEDURE — 86880 COOMBS TEST DIRECT: CPT

## 2025-06-02 PROCEDURE — 86901 BLOOD TYPING SEROLOGIC RH(D): CPT

## 2025-06-02 PROCEDURE — 87077 CULTURE AEROBIC IDENTIFY: CPT

## 2025-06-02 PROCEDURE — 85027 COMPLETE CBC AUTOMATED: CPT

## 2025-06-02 PROCEDURE — 80048 BASIC METABOLIC PNL TOTAL CA: CPT

## 2025-06-02 PROCEDURE — 86900 BLOOD TYPING SEROLOGIC ABO: CPT

## 2025-06-02 RX ORDER — LIDOCAINE HCL/PF 10 MG/ML
0.2 VIAL (ML) INJECTION ONCE
Refills: 0 | Status: DISCONTINUED | OUTPATIENT
Start: 2025-06-19 | End: 2025-06-19

## 2025-06-02 NOTE — H&P PST ADULT - NSICDXPASTMEDICALHX_GEN_ALL_CORE_FT
PAST MEDICAL HISTORY:  Anemia     BPH (benign prostatic hyperplasia)     CAD (coronary artery disease)     COPD, mild     H/O polymyalgia rheumatica     History of bone marrow biopsy     History of loop recorder     HLD (hyperlipidemia)     HTN (hypertension)     Kidney stone     MDS (myelodysplastic syndrome), low grade     LY (obstructive sleep apnea)     Paroxysmal atrial fibrillation     S/P PTCA (percutaneous transluminal coronary angioplasty)

## 2025-06-02 NOTE — H&P PST ADULT - ADMIT DATE
[de-identified] :  Presents for ear f/u Has b/l aud aids Co ear plugging No changes to hearing bilateral nose bleeds
02-Jun-2025

## 2025-06-02 NOTE — H&P PST ADULT - HISTORY OF PRESENT ILLNESS
83 yo male with CAD s/p coronary stents x5 last 2019, paroxysmal afib s/p ablation 2017,2021, HTn.HLD, polymalgia rheumatica ,loop recorder implant, LY, COPD, anemia requiring several transfusions in May,  followed by lolly Andrew- possible mild MDS from bone marrow biopsy, with initial dose aranesp to be given this week), s/p mitraclip 6/2024 for mitral regurgiation. pt with recent hospitalization [facility] 4/30/25-5/13/25 for left kidney stone, failed attempt to stent, now for left ureteroscopy laser lithotripsy with stent insertion on 6/19/25 with second procedure 6/26/25 for laser enucleation of prostate with morcellation/cystoscopy.

## 2025-06-02 NOTE — H&P PST ADULT - NSICDXPASTSURGICALHX_GEN_ALL_CORE_FT
PAST SURGICAL HISTORY:  H/O insertion of nephrostomy tube     H/O mitral valve repair     History of loop recorder     S/P cataract surgery     S/P total knee replacement, left

## 2025-06-02 NOTE — H&P PST ADULT - ASSESSMENT
DASI score: 5  DASI activity: can do adl's no formal exercise  Loose teeth or denture:  none  CAPRINI SCORE    AGE RELATED RISK FACTORS                                                             [ ] Age 41-60 years                                            (1 Point)  [ ] Age: 61-74 years                                           (2 Points)                 [x ] Age= 75 years                                                (3 Points)             DISEASE RELATED RISK FACTORS                                                       [x ] Edema in the lower extremities                 (1 Point)                     [ ] Varicose veins                                               (1 Point)                                 x[ ] BMI > 25 Kg/m2                                            (1 Point)                                  [ ] Serious infection (ie PNA)                            (1 Point)                     [ ] Lung disease ( COPD, Emphysema)            (1 Point)                                                                          [ ] Acute myocardial infarction                         (1 Point)                  [ ] Congestive heart failure (in the previous month)  (1 Point)         [ ] Inflammatory bowel disease                            (1 Point)                  [ ] Central venous access, PICC or Port               (2 points)       (within the last month)                                                                [ ] Stroke (in the previous month)                        (5 Points)    [ ] Previous or present malignancy                       (2 points)                                                                                                                                                         HEMATOLOGY RELATED FACTORS                                                         [ ] Prior episodes of VTE                                     (3 Points)                     [ ] Positive family history for VTE                      (3 Points)                  [ ] Prothrombin 64796 A                                     (3 Points)                     [ ] Factor V Leiden                                                (3 Points)                        [ ] Lupus anticoagulants                                      (3 Points)                                                           [ ] Anticardiolipin antibodies                              (3 Points)                                                       [ ] High homocysteine in the blood                   (3 Points)                                             [ ] Other congenital or acquired thrombophilia      (3 Points)                                                [ ] Heparin induced thrombocytopenia                  (3 Points)                                        MOBILITY RELATED FACTORS  [ ] Bed rest                                                         (1 Point)  [ ] Plaster cast                                                    (2 points)  [ ] Bed bound for more than 72 hours           (2 Points)    GENDER SPECIFIC FACTORS  [ ] Pregnancy or had a baby within the last month   (1 Point)  [ ] Post-partum < 6 weeks                                   (1 Point)  [ ] Hormonal therapy  or oral contraception   (1 Point)  [ ] History of pregnancy complications              (1 point)  [ ] Unexplained or recurrent              (1 Point)    OTHER RISK FACTORS                                           (1 Point)  [ ] BMI >40, smoking, diabetes requiring insulin, chemotherapy  blood transfusions and length of surgery over 2 hours    SURGERY RELATED RISK FACTORS  [ ]  Section within the last month     (1 Point)  [ ] Minor surgery                                                  (1 Point)  [ ] Arthroscopic surgery                                       (2 Points)  [x ] Planned major surgery lasting more            (2 Points)      than 45 minutes     [ ] Elective hip or knee joint replacement       (5 points)       surgery                                                TRAUMA RELATED RISK FACTORS  [ ] Fracture of the hip, pelvis, or leg                       (5 Points)  [ ] Spinal cord injury resulting in paralysis             (5 points)       (in the previous month)    [ ] Paralysis  (less than 1 month)                             (5 Points)  [ ] Multiple Trauma within 1 month                        (5 Points)    Total Score [    7    ]    Caprini Score 0-2: Low Risk, NO VTE prophylaxis required for most patients, encourage ambulation  Caprini Score 3-6: Moderate Risk , pharmacologic VTE prophylaxis is indicated for most patients (in the absence of contraindications)  Caprini Score Greater than or =7: High risk, pharmocologic VTE prophylaxis indicated for most patients (in the absence of contraindications)

## 2025-06-03 PROCEDURE — 86077 PHYS BLOOD BANK SERV XMATCH: CPT

## 2025-06-04 LAB
CULTURE RESULTS: ABNORMAL
SPECIMEN SOURCE: SIGNIFICANT CHANGE UP

## 2025-06-06 ENCOUNTER — NON-APPOINTMENT (OUTPATIENT)
Age: 85
End: 2025-06-06

## 2025-06-08 PROBLEM — T83.9XXA FOLEY CATHETER PROBLEM: Status: ACTIVE | Noted: 2025-06-08

## 2025-06-09 RX ORDER — FLUCONAZOLE 100 MG/1
100 TABLET ORAL DAILY
Qty: 5 | Refills: 0 | Status: ACTIVE | COMMUNITY
Start: 2025-06-05 | End: 1900-01-01

## 2025-06-19 ENCOUNTER — EMERGENCY (EMERGENCY)
Facility: HOSPITAL | Age: 85
LOS: 1 days | End: 2025-06-19
Attending: STUDENT IN AN ORGANIZED HEALTH CARE EDUCATION/TRAINING PROGRAM
Payer: MEDICARE

## 2025-06-19 ENCOUNTER — NON-APPOINTMENT (OUTPATIENT)
Age: 85
End: 2025-06-19

## 2025-06-19 ENCOUNTER — TRANSCRIPTION ENCOUNTER (OUTPATIENT)
Age: 85
End: 2025-06-19

## 2025-06-19 ENCOUNTER — APPOINTMENT (OUTPATIENT)
Dept: UROLOGY | Facility: HOSPITAL | Age: 85
End: 2025-06-19

## 2025-06-19 ENCOUNTER — OUTPATIENT (OUTPATIENT)
Dept: OUTPATIENT SERVICES | Facility: HOSPITAL | Age: 85
LOS: 1 days | End: 2025-06-19
Payer: MEDICARE

## 2025-06-19 VITALS
RESPIRATION RATE: 17 BRPM | HEART RATE: 75 BPM | DIASTOLIC BLOOD PRESSURE: 73 MMHG | OXYGEN SATURATION: 97 % | SYSTOLIC BLOOD PRESSURE: 155 MMHG

## 2025-06-19 VITALS
RESPIRATION RATE: 20 BRPM | TEMPERATURE: 98 F | WEIGHT: 201.94 LBS | SYSTOLIC BLOOD PRESSURE: 146 MMHG | DIASTOLIC BLOOD PRESSURE: 71 MMHG | HEART RATE: 65 BPM | OXYGEN SATURATION: 95 % | HEIGHT: 72 IN

## 2025-06-19 VITALS
OXYGEN SATURATION: 100 % | HEIGHT: 72 IN | RESPIRATION RATE: 18 BRPM | SYSTOLIC BLOOD PRESSURE: 154 MMHG | DIASTOLIC BLOOD PRESSURE: 76 MMHG | HEART RATE: 88 BPM | WEIGHT: 205.03 LBS | TEMPERATURE: 97 F

## 2025-06-19 DIAGNOSIS — Z98.890 OTHER SPECIFIED POSTPROCEDURAL STATES: Chronic | ICD-10-CM

## 2025-06-19 DIAGNOSIS — N20.0 CALCULUS OF KIDNEY: ICD-10-CM

## 2025-06-19 DIAGNOSIS — Z98.49 CATARACT EXTRACTION STATUS, UNSPECIFIED EYE: Chronic | ICD-10-CM

## 2025-06-19 DIAGNOSIS — Z96.652 PRESENCE OF LEFT ARTIFICIAL KNEE JOINT: Chronic | ICD-10-CM

## 2025-06-19 PROBLEM — Z98.61 CORONARY ANGIOPLASTY STATUS: Chronic | Status: ACTIVE | Noted: 2025-06-02

## 2025-06-19 PROBLEM — G47.33 OBSTRUCTIVE SLEEP APNEA (ADULT) (PEDIATRIC): Chronic | Status: ACTIVE | Noted: 2025-06-02

## 2025-06-19 PROBLEM — N40.0 BENIGN PROSTATIC HYPERPLASIA WITHOUT LOWER URINARY TRACT SYMPTOMS: Chronic | Status: ACTIVE | Noted: 2025-06-02

## 2025-06-19 PROCEDURE — 86902 BLOOD TYPE ANTIGEN DONOR EA: CPT

## 2025-06-19 PROCEDURE — 86850 RBC ANTIBODY SCREEN: CPT

## 2025-06-19 PROCEDURE — 74420 UROGRAPHY RTRGR +-KUB: CPT | Mod: 26

## 2025-06-19 PROCEDURE — 50690 INJECTION FOR URETER X-RAY: CPT | Mod: 59

## 2025-06-19 PROCEDURE — 86900 BLOOD TYPING SEROLOGIC ABO: CPT

## 2025-06-19 PROCEDURE — C2625: CPT

## 2025-06-19 PROCEDURE — C1889: CPT

## 2025-06-19 PROCEDURE — C1758: CPT

## 2025-06-19 PROCEDURE — 86901 BLOOD TYPING SEROLOGIC RH(D): CPT

## 2025-06-19 PROCEDURE — C9399: CPT

## 2025-06-19 PROCEDURE — P9040: CPT

## 2025-06-19 PROCEDURE — 86880 COOMBS TEST DIRECT: CPT

## 2025-06-19 PROCEDURE — 86922 COMPATIBILITY TEST ANTIGLOB: CPT

## 2025-06-19 PROCEDURE — C1769: CPT

## 2025-06-19 PROCEDURE — 86870 RBC ANTIBODY IDENTIFICATION: CPT

## 2025-06-19 PROCEDURE — 52351 CYSTOURETERO & OR PYELOSCOPE: CPT

## 2025-06-19 PROCEDURE — 52351 CYSTOURETERO & OR PYELOSCOPE: CPT | Mod: LT

## 2025-06-19 PROCEDURE — 86077 PHYS BLOOD BANK SERV XMATCH: CPT

## 2025-06-19 PROCEDURE — 52332 CYSTOSCOPY AND TREATMENT: CPT

## 2025-06-19 PROCEDURE — 76000 FLUOROSCOPY <1 HR PHYS/QHP: CPT

## 2025-06-19 PROCEDURE — 50389 REMOVE RENAL TUBE W/FLUORO: CPT

## 2025-06-19 DEVICE — KIT A-LINE 1LUM 20G X 12CM SAFE KIT: Type: IMPLANTABLE DEVICE | Site: LEFT | Status: FUNCTIONAL

## 2025-06-19 DEVICE — URETERAL CATH SOF-FLEX OPEN END 6FR .040" X 70CM: Type: IMPLANTABLE DEVICE | Site: LEFT | Status: FUNCTIONAL

## 2025-06-19 DEVICE — GWIRE ANGLE 0.035INX3CM: Type: IMPLANTABLE DEVICE | Site: LEFT | Status: FUNCTIONAL

## 2025-06-19 DEVICE — URETERAL STENT INLAY OPTIMA 8FR 26CM: Type: IMPLANTABLE DEVICE | Site: LEFT | Status: FUNCTIONAL

## 2025-06-19 DEVICE — GUIDEWIRE SENSOR DUAL-FLEX NITINOL STRAIGHT .035" X 150CM: Type: IMPLANTABLE DEVICE | Site: LEFT | Status: FUNCTIONAL

## 2025-06-19 DEVICE — STONE BASKET ZEROTIP NITINOL 4-WIRE 1.9FR 120CM X 12MM: Type: IMPLANTABLE DEVICE | Site: LEFT | Status: FUNCTIONAL

## 2025-06-19 RX ORDER — AMOXICILLIN AND CLAVULANATE POTASSIUM 500; 125 MG/1; MG/1
1 TABLET, FILM COATED ORAL
Qty: 6 | Refills: 0
Start: 2025-06-19 | End: 2025-06-21

## 2025-06-19 RX ORDER — CEFAZOLIN SODIUM IN 0.9 % NACL 3 G/100 ML
2000 INTRAVENOUS SOLUTION, PIGGYBACK (ML) INTRAVENOUS ONCE
Refills: 0 | Status: COMPLETED | OUTPATIENT
Start: 2025-06-19 | End: 2025-06-19

## 2025-06-19 RX ORDER — FENTANYL CITRATE-0.9 % NACL/PF 100MCG/2ML
25 SYRINGE (ML) INTRAVENOUS
Refills: 0 | Status: DISCONTINUED | OUTPATIENT
Start: 2025-06-19 | End: 2025-06-19

## 2025-06-19 RX ORDER — ONDANSETRON HCL/PF 4 MG/2 ML
4 VIAL (ML) INJECTION ONCE
Refills: 0 | Status: DISCONTINUED | OUTPATIENT
Start: 2025-06-19 | End: 2025-06-19

## 2025-06-19 NOTE — ASU DISCHARGE PLAN (ADULT/PEDIATRIC) - FINANCIAL ASSISTANCE
[facility] provides services at a reduced cost to those who are determined to be eligible through [facility]’s financial assistance program. Information regarding [facility]’s financial assistance program can be found by going to [email or web address] or by calling [phone].

## 2025-06-19 NOTE — BRIEF OPERATIVE NOTE - OPERATION/FINDINGS
Left ureterosocpy with unable to find stone. Patient had cut prior NU. We removed remainder of NU and left a ureteral stent with no string. Placed weiss.

## 2025-06-19 NOTE — ASU DISCHARGE PLAN (ADULT/PEDIATRIC) - ASU DC SPECIAL INSTRUCTIONSFT
It is common to have blood in the urine after your procedure.  It may be pink or even red; inform your doctor if you have a significant amount of clots in the urine or if you are unable to void at all.  Be sure to drink plenty of fluids at all times.    -It is not uncommon to have some burning when you urinate or to even notice some stone fragments in your urine.  -You have an internal stent (a hollow tube that runs from the kidney to your bladder) after your procedure, helping your kidney drain down to your bladder after your surgery.  Some patients do not notice that they have a stent, while others complain of the sensation of needing to urinate frequently, burning on urination, or even some back pain (especially when they go to urinate). These sensations usually improve gradually, some faster than others. This is not uncommon, but may initially warrant the use of the pain medication which you were prescribed.  While the stent is in place, your urine may continue to be bloody. This stent is temporary and must be removed by your urologist as an outpatient.  -Provided that you are not restricted with fluids by your physician, you should drink 6-8 (8 oz.) glasses of fluid per day.  -You may resume your regular diet and regular medication regimen.    -You may shower or bathe.    -You may take over the counter pain medications such as Motrin and Tylenol as needed for pain.  Do not exceed 4 grams of Tylenol daily.  Each medication may be taken every 6 hours.  You may alternate these medications such that you take either one every 3 hours.  If you have severe pain that does not improve with the pain medication or you have persistent vomiting, call your doctor.  -You will have a prescription for an antibiotic when you go home.  Take this medication as instructed; if you miss one dose, resume taking it on your previous schedule until you have completed the entire course of treatment.  -As you have just underwent general anesthesia, you should refrain from driving, heavy lifting, smoking, alcohol consumption, or important decision making for the next 24 hours.  You may climb stairs and you may resume sexual activity.  -Call your physician if you have a fever over 101F.  -Make a follow up appointment for with your urologist when you arrive home (or the next business day).  -Call your urologist during normal business hours with any other routine questions.  -Please take augmentin and fluconazole  -Plan for surgery next week  -Keep weiss

## 2025-06-20 PROBLEM — Z87.39 PERSONAL HISTORY OF OTHER DISEASES OF THE MUSCULOSKELETAL SYSTEM AND CONNECTIVE TISSUE: Chronic | Status: ACTIVE | Noted: 2025-06-02

## 2025-06-20 PROBLEM — I25.10 ATHEROSCLEROTIC HEART DISEASE OF NATIVE CORONARY ARTERY WITHOUT ANGINA PECTORIS: Chronic | Status: ACTIVE | Noted: 2025-06-02

## 2025-06-20 PROBLEM — D46.Z OTHER MYELODYSPLASTIC SYNDROMES: Chronic | Status: ACTIVE | Noted: 2025-06-02

## 2025-06-20 PROBLEM — I10 ESSENTIAL (PRIMARY) HYPERTENSION: Chronic | Status: ACTIVE | Noted: 2025-06-02

## 2025-06-20 PROBLEM — J44.9 CHRONIC OBSTRUCTIVE PULMONARY DISEASE, UNSPECIFIED: Chronic | Status: ACTIVE | Noted: 2025-06-02

## 2025-06-20 PROBLEM — Z98.890 OTHER SPECIFIED POSTPROCEDURAL STATES: Chronic | Status: ACTIVE | Noted: 2025-06-02

## 2025-06-20 PROBLEM — E78.5 HYPERLIPIDEMIA, UNSPECIFIED: Chronic | Status: ACTIVE | Noted: 2025-06-02

## 2025-06-20 LAB
ALBUMIN SERPL ELPH-MCNC: 3.6 G/DL — SIGNIFICANT CHANGE UP (ref 3.3–5)
ALP SERPL-CCNC: 112 U/L — SIGNIFICANT CHANGE UP (ref 40–120)
ALT FLD-CCNC: 9 U/L — LOW (ref 10–45)
ANION GAP SERPL CALC-SCNC: 13 MMOL/L — SIGNIFICANT CHANGE UP (ref 5–17)
ANISOCYTOSIS BLD QL: SLIGHT — SIGNIFICANT CHANGE UP
APTT BLD: 32.5 SEC — SIGNIFICANT CHANGE UP (ref 26.1–36.8)
AST SERPL-CCNC: 19 U/L — SIGNIFICANT CHANGE UP (ref 10–40)
BASOPHILS # BLD AUTO: 0.01 K/UL — SIGNIFICANT CHANGE UP (ref 0–0.2)
BASOPHILS # BLD MANUAL: 0 K/UL — SIGNIFICANT CHANGE UP (ref 0–0.2)
BASOPHILS NFR BLD AUTO: 0.2 % — SIGNIFICANT CHANGE UP (ref 0–2)
BASOPHILS NFR BLD MANUAL: 0 % — SIGNIFICANT CHANGE UP (ref 0–2)
BILIRUB SERPL-MCNC: 1.4 MG/DL — HIGH (ref 0.2–1.2)
BLD GP AB SCN SERPL QL: POSITIVE — SIGNIFICANT CHANGE UP
BUN SERPL-MCNC: 14 MG/DL — SIGNIFICANT CHANGE UP (ref 7–23)
CALCIUM SERPL-MCNC: 9.1 MG/DL — SIGNIFICANT CHANGE UP (ref 8.4–10.5)
CHLORIDE SERPL-SCNC: 103 MMOL/L — SIGNIFICANT CHANGE UP (ref 96–108)
CO2 SERPL-SCNC: 22 MMOL/L — SIGNIFICANT CHANGE UP (ref 22–31)
CREAT SERPL-MCNC: 0.79 MG/DL — SIGNIFICANT CHANGE UP (ref 0.5–1.3)
DACRYOCYTES BLD QL SMEAR: SLIGHT — SIGNIFICANT CHANGE UP
EGFR: 88 ML/MIN/1.73M2 — SIGNIFICANT CHANGE UP
EGFR: 88 ML/MIN/1.73M2 — SIGNIFICANT CHANGE UP
EOSINOPHIL # BLD AUTO: 0 K/UL — SIGNIFICANT CHANGE UP (ref 0–0.5)
EOSINOPHIL # BLD MANUAL: 0 K/UL — SIGNIFICANT CHANGE UP (ref 0–0.5)
EOSINOPHIL NFR BLD AUTO: 0 % — SIGNIFICANT CHANGE UP (ref 0–6)
EOSINOPHIL NFR BLD MANUAL: 0 % — SIGNIFICANT CHANGE UP (ref 0–6)
GLUCOSE SERPL-MCNC: 164 MG/DL — HIGH (ref 70–99)
HCT VFR BLD CALC: 28.9 % — LOW (ref 39–50)
HCT VFR BLD CALC: 31.3 % — LOW (ref 39–50)
HCT VFR BLD CALC: 32.6 % — LOW (ref 39–50)
HGB BLD-MCNC: 10 G/DL — LOW (ref 13–17)
HGB BLD-MCNC: 8.9 G/DL — LOW (ref 13–17)
HGB BLD-MCNC: 9.5 G/DL — LOW (ref 13–17)
IMM GRANULOCYTES # BLD AUTO: 0.02 K/UL — SIGNIFICANT CHANGE UP (ref 0–0.07)
IMM GRANULOCYTES NFR BLD AUTO: 0.5 % — SIGNIFICANT CHANGE UP (ref 0–0.9)
INR BLD: 1.15 RATIO — SIGNIFICANT CHANGE UP (ref 0.85–1.16)
LYMPHOCYTES # BLD AUTO: 1.18 K/UL — SIGNIFICANT CHANGE UP (ref 1–3.3)
LYMPHOCYTES # BLD MANUAL: 1 K/UL — SIGNIFICANT CHANGE UP (ref 1–3.3)
LYMPHOCYTES NFR BLD AUTO: 26.8 % — SIGNIFICANT CHANGE UP (ref 13–44)
LYMPHOCYTES NFR BLD MANUAL: 22.6 % — SIGNIFICANT CHANGE UP (ref 13–44)
MACROCYTES BLD QL: SLIGHT — SIGNIFICANT CHANGE UP
MCHC RBC-ENTMCNC: 29 PG — SIGNIFICANT CHANGE UP (ref 27–34)
MCHC RBC-ENTMCNC: 29.4 PG — SIGNIFICANT CHANGE UP (ref 27–34)
MCHC RBC-ENTMCNC: 29.5 PG — SIGNIFICANT CHANGE UP (ref 27–34)
MCHC RBC-ENTMCNC: 30.4 G/DL — LOW (ref 32–36)
MCHC RBC-ENTMCNC: 30.7 G/DL — LOW (ref 32–36)
MCHC RBC-ENTMCNC: 30.8 G/DL — LOW (ref 32–36)
MCV RBC AUTO: 95.4 FL — SIGNIFICANT CHANGE UP (ref 80–100)
MCV RBC AUTO: 95.7 FL — SIGNIFICANT CHANGE UP (ref 80–100)
MCV RBC AUTO: 95.9 FL — SIGNIFICANT CHANGE UP (ref 80–100)
MICROCYTES BLD QL: SLIGHT — SIGNIFICANT CHANGE UP
MONOCYTES # BLD AUTO: 0.34 K/UL — SIGNIFICANT CHANGE UP (ref 0–0.9)
MONOCYTES # BLD MANUAL: 0.11 K/UL — SIGNIFICANT CHANGE UP (ref 0–0.9)
MONOCYTES NFR BLD AUTO: 7.7 % — SIGNIFICANT CHANGE UP (ref 2–14)
MONOCYTES NFR BLD MANUAL: 2.6 % — SIGNIFICANT CHANGE UP (ref 2–14)
NEUTROPHILS # BLD AUTO: 2.86 K/UL — SIGNIFICANT CHANGE UP (ref 1.8–7.4)
NEUTROPHILS # BLD MANUAL: 3.3 K/UL — SIGNIFICANT CHANGE UP (ref 1.8–7.4)
NEUTROPHILS NFR BLD AUTO: 64.8 % — SIGNIFICANT CHANGE UP (ref 43–77)
NEUTROPHILS NFR BLD MANUAL: 74.8 % — SIGNIFICANT CHANGE UP (ref 43–77)
NRBC # BLD AUTO: 0 K/UL — SIGNIFICANT CHANGE UP (ref 0–0)
NRBC # FLD: 0 K/UL — SIGNIFICANT CHANGE UP (ref 0–0)
NRBC BLD AUTO-RTO: 0 /100 WBCS — SIGNIFICANT CHANGE UP (ref 0–0)
OVALOCYTES BLD QL SMEAR: SLIGHT — SIGNIFICANT CHANGE UP
PLAT MORPH BLD: NORMAL — SIGNIFICANT CHANGE UP
PLATELET # BLD AUTO: 155 K/UL — SIGNIFICANT CHANGE UP (ref 150–400)
PLATELET # BLD AUTO: 165 K/UL — SIGNIFICANT CHANGE UP (ref 150–400)
PLATELET # BLD AUTO: 168 K/UL — SIGNIFICANT CHANGE UP (ref 150–400)
PMV BLD: 8.8 FL — SIGNIFICANT CHANGE UP (ref 7–13)
PMV BLD: 9.2 FL — SIGNIFICANT CHANGE UP (ref 7–13)
PMV BLD: 9.3 FL — SIGNIFICANT CHANGE UP (ref 7–13)
POIKILOCYTOSIS BLD QL AUTO: SLIGHT — SIGNIFICANT CHANGE UP
POLYCHROMASIA BLD QL SMEAR: SLIGHT — SIGNIFICANT CHANGE UP
POTASSIUM SERPL-MCNC: 3.9 MMOL/L — SIGNIFICANT CHANGE UP (ref 3.5–5.3)
POTASSIUM SERPL-SCNC: 3.9 MMOL/L — SIGNIFICANT CHANGE UP (ref 3.5–5.3)
PROT SERPL-MCNC: 7 G/DL — SIGNIFICANT CHANGE UP (ref 6–8.3)
PROTHROM AB SERPL-ACNC: 13.2 SEC — SIGNIFICANT CHANGE UP (ref 9.9–13.4)
RBC # BLD: 3.02 M/UL — LOW (ref 4.2–5.8)
RBC # BLD: 3.28 M/UL — LOW (ref 4.2–5.8)
RBC # BLD: 3.4 M/UL — LOW (ref 4.2–5.8)
RBC # FLD: 21.6 % — HIGH (ref 10.3–14.5)
RBC # FLD: 21.7 % — HIGH (ref 10.3–14.5)
RBC # FLD: 22.1 % — HIGH (ref 10.3–14.5)
RBC BLD AUTO: ABNORMAL
RH IG SCN BLD-IMP: NEGATIVE — SIGNIFICANT CHANGE UP
SCHISTOCYTES BLD QL AUTO: SLIGHT — SIGNIFICANT CHANGE UP
SODIUM SERPL-SCNC: 138 MMOL/L — SIGNIFICANT CHANGE UP (ref 135–145)
WBC # BLD: 4.41 K/UL — SIGNIFICANT CHANGE UP (ref 3.8–10.5)
WBC # BLD: 4.51 K/UL — SIGNIFICANT CHANGE UP (ref 3.8–10.5)
WBC # BLD: 5.83 K/UL — SIGNIFICANT CHANGE UP (ref 3.8–10.5)
WBC # FLD AUTO: 4.41 K/UL — SIGNIFICANT CHANGE UP (ref 3.8–10.5)
WBC # FLD AUTO: 4.51 K/UL — SIGNIFICANT CHANGE UP (ref 3.8–10.5)
WBC # FLD AUTO: 5.83 K/UL — SIGNIFICANT CHANGE UP (ref 3.8–10.5)

## 2025-06-20 PROCEDURE — 76770 US EXAM ABDO BACK WALL COMP: CPT | Mod: 26

## 2025-06-20 PROCEDURE — 99283 EMERGENCY DEPT VISIT LOW MDM: CPT | Mod: 25

## 2025-06-20 PROCEDURE — 99223 1ST HOSP IP/OBS HIGH 75: CPT | Mod: FS

## 2025-06-20 PROCEDURE — 51700 IRRIGATION OF BLADDER: CPT

## 2025-06-20 PROCEDURE — 99283 EMERGENCY DEPT VISIT LOW MDM: CPT

## 2025-06-20 RX ORDER — AMOXICILLIN AND CLAVULANATE POTASSIUM 500; 125 MG/1; MG/1
1 TABLET, FILM COATED ORAL EVERY 12 HOURS
Refills: 0 | Status: DISCONTINUED | OUTPATIENT
Start: 2025-06-20 | End: 2025-06-23

## 2025-06-20 RX ORDER — OXYBUTYNIN CHLORIDE 5 MG/1
5 TABLET, FILM COATED, EXTENDED RELEASE ORAL
Refills: 0 | Status: DISCONTINUED | OUTPATIENT
Start: 2025-06-20 | End: 2025-06-23

## 2025-06-20 RX ORDER — FLUCONAZOLE 150 MG
100 TABLET ORAL ONCE
Refills: 0 | Status: COMPLETED | OUTPATIENT
Start: 2025-06-20 | End: 2025-06-20

## 2025-06-20 RX ORDER — ASPIRIN 325 MG
81 TABLET ORAL DAILY
Refills: 0 | Status: DISCONTINUED | OUTPATIENT
Start: 2025-06-20 | End: 2025-06-23

## 2025-06-20 RX ORDER — FINASTERIDE 1 MG/1
5 TABLET, FILM COATED ORAL DAILY
Refills: 0 | Status: DISCONTINUED | OUTPATIENT
Start: 2025-06-20 | End: 2025-06-23

## 2025-06-20 RX ORDER — AMLODIPINE BESYLATE 10 MG/1
2.5 TABLET ORAL AT BEDTIME
Refills: 0 | Status: DISCONTINUED | OUTPATIENT
Start: 2025-06-20 | End: 2025-06-23

## 2025-06-20 RX ORDER — FUROSEMIDE 10 MG/ML
40 INJECTION INTRAMUSCULAR; INTRAVENOUS DAILY
Refills: 0 | Status: DISCONTINUED | OUTPATIENT
Start: 2025-06-20 | End: 2025-06-23

## 2025-06-20 RX ORDER — TAMSULOSIN HYDROCHLORIDE 0.4 MG/1
0.4 CAPSULE ORAL AT BEDTIME
Refills: 0 | Status: DISCONTINUED | OUTPATIENT
Start: 2025-06-20 | End: 2025-06-23

## 2025-06-20 RX ADMIN — Medication 81 MILLIGRAM(S): at 13:02

## 2025-06-20 RX ADMIN — TAMSULOSIN HYDROCHLORIDE 0.4 MILLIGRAM(S): 0.4 CAPSULE ORAL at 22:19

## 2025-06-20 RX ADMIN — OXYBUTYNIN CHLORIDE 5 MILLIGRAM(S): 5 TABLET, FILM COATED, EXTENDED RELEASE ORAL at 12:57

## 2025-06-20 RX ADMIN — AMOXICILLIN AND CLAVULANATE POTASSIUM 1 TABLET(S): 500; 125 TABLET, FILM COATED ORAL at 22:19

## 2025-06-20 RX ADMIN — AMLODIPINE BESYLATE 2.5 MILLIGRAM(S): 10 TABLET ORAL at 22:19

## 2025-06-20 RX ADMIN — FUROSEMIDE 40 MILLIGRAM(S): 10 INJECTION INTRAMUSCULAR; INTRAVENOUS at 12:57

## 2025-06-20 RX ADMIN — FINASTERIDE 5 MILLIGRAM(S): 1 TABLET, FILM COATED ORAL at 12:58

## 2025-06-20 RX ADMIN — Medication 100 MILLIGRAM(S): at 12:57

## 2025-06-20 NOTE — ED PROVIDER NOTE - PROGRESS NOTE DETAILS
Attending Doyle Morgan:  Discussed with urology, recommends CDU.  CDU has bed for virtual observation.  Plan for kidney bladder ultrasound per urology.  Hemoglobin returned at 10 no need for PRBC transfusion.  Plan to continue monitoring urine output in the interim see if the urine clears up after urology irrigated.  There were 750 cc of bloody urine in the bag upon my initial evaluation. Attending Doyle Morgan:  .Patient with transient hypoxia however no shortness of breath clear lungs no increased work of breathing.  No further intervention needed.  Patient already takes home qd in morning as needed.  History further complicated by COPD however no wheeze.  No further intervention needed at this time.  Hemoglobin stable at 10. Dr. Bacon: From Dr. Morgan.  84-year-old male history of A-fib on Eliquis (held for the last 3 days for urologic procedure), CAD with stents, hypertension, hyperlipidemia, COPD, presenting with hematuria status post cystoscopy.  Patient to be placed in the observation unit to irrigate the Rabago.  Last H&H was 10/32.  Will trend H&H in the CDU.  Patient seen at bedside, well-appearing, no acute distress, has hematuria in his Rabago.

## 2025-06-20 NOTE — ED CDU PROVIDER INITIAL DAY NOTE - NS ED ATTENDING STATEMENT MOD
The patient is a 14y Male complaining of medical evaluation. This was a shared visit with the LULY. I reviewed and verified the documentation.

## 2025-06-20 NOTE — PROGRESS NOTE ADULT - SUBJECTIVE AND OBJECTIVE BOX
Subjective  Denies fevers, chills, nausea, vomiting, SOB, CP.  Tolerating diet.    Objective    Vital signs  T(F): , Max: 97.6 (06-20-25 @ 03:29)  HR: 75 (06-20-25 @ 08:26)  BP: 158/72 (06-20-25 @ 08:26)  SpO2: 98% (06-20-25 @ 08:26)  Wt(kg): --    Output     OUT:    Indwelling Catheter - Urethral (mL): 1200 mL  Total OUT: 1200 mL    Total NET: -1200 mL          Gen: NAD  Abd: soft, nontender, nondistended  : weiss secured in place, draining CYU    Labs      06-20 @ 10:38    WBC 4.51  / Hct 28.9  / SCr --       06-20 @ 03:36    WBC 4.41  / Hct 32.6  / SCr 0.79           Urine Cx: ?  Blood Cx: ?    Imaging Subjective  feels okay    Objective    Vital signs  T(F): , Max: 97.6 (06-20-25 @ 03:29)  HR: 75 (06-20-25 @ 08:26)  BP: 158/72 (06-20-25 @ 08:26)  SpO2: 98% (06-20-25 @ 08:26)  Wt(kg): --    Output     OUT:    Indwelling Catheter - Urethral (mL): 1200 mL  Total OUT: 1200 mL    Total NET: -1200 mL          Gen: NAD  Abd: soft, nontender, nondistended  : weiss secured in place, draining translucent watermelon     Labs      06-20 @ 10:38    WBC 4.51  / Hct 28.9  / SCr --       06-20 @ 03:36    WBC 4.41  / Hct 32.6  / SCr 0.79           Urine Cx: ?  Blood Cx: ?    Imaging

## 2025-06-20 NOTE — CONSULT NOTE ADULT - SUBJECTIVE AND OBJECTIVE BOX
HPI:  84y Male with PMHx of CAD s/p coronary stents x5 last 2019, paroxysmal afib s/p ablation 2017,2021, HTn.HLD, polymalgia rheumatica ,loop recorder implant, LY, COPD, anemia requiring several transfusions in May,  followed by lolly Andrew- possible mild MDS from bone marrow biopsy, with initial dose aranesp to be given this week), s/p mitraclip 6/2024 for mitral regurgiation. pt with recent hospitalization [facility] 4/30/25-5/13/25 for left kidney stone, failed attempt to stent, now for left ureteroscopy laser lithotripsy with stent insertion on 6/19/25 with second procedure 6/26/25 for laser enucleation of prostate with morcellation/cystoscopy.     Patient is now s/p L URS, removal of L NT, placement of L ureteral stent. Patient is now presenting to the ER in clot retention after being discharged from the PACU. Patient states that he started to notice bloody drainage and clots around his weiss catheter about 5min after he got to his hotel room. Also started to notice decreased drainage from his weiss catheter and suprapubic discomfort/pain around the same time. Patient and his daughter called the service line, and was directed to head to the ER for further evaluation. In the ER, patient has existing 20F weiss catheter, dark wine colored urine in tubing initially, then manual bladder irrigation was performed. ~25cc of clots was irrigated out from the weiss, color was irrigated to light peach that darkened to light strawberry within 15-20min.   Per patient, he takes Eliquis, has not taken it for the last 3d, was told to stop it until his procedure next week with Dr. Stevens.     PAST MEDICAL & SURGICAL HISTORY:  CAD (coronary artery disease)      HTN (hypertension)      HLD (hyperlipidemia)      Paroxysmal atrial fibrillation      History of loop recorder      H/O polymyalgia rheumatica      S/P PTCA (percutaneous transluminal coronary angioplasty)      LY (obstructive sleep apnea)      Anemia      History of bone marrow biopsy      MDS (myelodysplastic syndrome), low grade      COPD, mild      BPH (benign prostatic hyperplasia)      Kidney stone      H/O mitral valve repair      S/P total knee replacement, left      History of loop recorder      H/O insertion of nephrostomy tube      S/P cataract surgery          FAMILY HISTORY:  No known  malignancy     Denies alcohol and drug abuse, nonsmoker     MEDICATIONS  (STANDING):    MEDICATIONS  (PRN):    Allergies    No Known Allergies    Intolerances      REVIEW OF SYSTEMS: Pertinent positives and negatives as stated in HPI, otherwise negative    Vital signs  T(C): 36.3 (06-19-25 @ 23:43), Max: 36.5 (06-19-25 @ 10:11)  HR: 88 (06-19-25 @ 23:43)  BP: 154/76 (06-19-25 @ 23:43)  SpO2: 100% (06-19-25 @ 23:43)  Wt(kg): --    Physical Exam  Gen: NAD  HEENT: normocephalic, atraumatic, no scleral icterus  Pulm: No respiratory distress, no subcostal retractions, no accessory muscle use   CV:  no JVD  Abd: Soft, ND, suprapubic TTP --> s/p manual bladder irrigation now Soft, NT, ND  : Chaperoned by Dr. Willoughby. Uncircumcised, no lesions.  Blood at and around urethral meatus and weiss catheter. Color of initial urine output dark wine colored --> s/p manual irrigation now light peach that is slowing darkening to light strawberry   MSK:  Moving all extremities  NEURO: A&Ox3, no focal neurological deficits, CN 2-12 grossly intact  SKIN: warm, dry    LABS:  CBC   BMP               Urine Cx:   Blood Cx:    Radiology: HPI:  84y Male with PMHx of CAD s/p coronary stents x5 last 2019, paroxysmal afib s/p ablation 2017,2021, HTn.HLD, polymalgia rheumatica ,loop recorder implant, LY, COPD, anemia requiring several transfusions in May,  followed by lolly Andrew- possible mild MDS from bone marrow biopsy, with initial dose aranesp to be given this week), s/p mitraclip 6/2024 for mitral regurgiation. pt with recent hospitalization [facility] 4/30/25-5/13/25 for left kidney stone, failed attempt to stent, now for left ureteroscopy laser lithotripsy with stent insertion on 6/19/25 with second procedure 6/26/25 for laser enucleation of prostate with morcellation/cystoscopy.     Patient is now s/p L URS, removal of L NT, placement of L ureteral stent. Patient is now presenting to the ER in clot retention after being discharged from the PACU. Patient states that he started to notice bloody drainage and clots around his weiss catheter about 5min after he got to his hotel room. Also started to notice decreased drainage from his weiss catheter and suprapubic discomfort/pain around the same time. Patient and his daughter called the service line, and was directed to head to the ER for further evaluation. In the ER, patient has existing 20F weiss catheter, dark wine colored urine in tubing initially, then manual bladder irrigation was performed. ~25cc of clots was irrigated out from the ewiss, color was irrigated to light peach that darkened to light strawberry within 15-20min.   Per patient, he takes Eliquis, has not taken it for the last 3d, was told to stop it until his procedure next week with Dr. Stevens.     PAST MEDICAL & SURGICAL HISTORY:   CAD (coronary artery disease)      HTN (hypertension)      HLD (hyperlipidemia)      Paroxysmal atrial fibrillation      History of loop recorder      H/O polymyalgia rheumatica      S/P PTCA (percutaneous transluminal coronary angioplasty)      LY (obstructive sleep apnea)      Anemia      History of bone marrow biopsy      MDS (myelodysplastic syndrome), low grade      COPD, mild      BPH (benign prostatic hyperplasia)      Kidney stone      H/O mitral valve repair      S/P total knee replacement, left      History of loop recorder      H/O insertion of nephrostomy tube      S/P cataract surgery          FAMILY HISTORY:  No known  malignancy     Denies alcohol and drug abuse, nonsmoker     MEDICATIONS  (STANDING):    MEDICATIONS  (PRN):    Allergies    No Known Allergies    Intolerances      REVIEW OF SYSTEMS: Pertinent positives and negatives as stated in HPI, otherwise negative    Vital signs  T(C): 36.3 (06-19-25 @ 23:43), Max: 36.5 (06-19-25 @ 10:11)  HR: 88 (06-19-25 @ 23:43)  BP: 154/76 (06-19-25 @ 23:43)  SpO2: 100% (06-19-25 @ 23:43)  Wt(kg): --    Physical Exam  Gen: NAD  HEENT: normocephalic, atraumatic, no scleral icterus  Pulm: No respiratory distress, no subcostal retractions, no accessory muscle use   CV:  no JVD  Abd: Soft, ND, suprapubic TTP --> s/p manual bladder irrigation now Soft, NT, ND  : Chaperoned by Dr. Willoughby. Uncircumcised, no lesions.  Blood at and around urethral meatus and weiss catheter. Color of initial urine output dark wine colored --> s/p manual irrigation now light peach that is slowing darkening to light strawberry   MSK:  Moving all extremities  NEURO: A&Ox3, no focal neurological deficits, CN 2-12 grossly intact  SKIN: warm, dry    LABS:  CBC   BMP               Urine Cx:   Blood Cx:    Radiology:

## 2025-06-20 NOTE — ED PROVIDER NOTE - CLINICAL SUMMARY MEDICAL DECISION MAKING FREE TEXT BOX
: 84-year-old male with a history of CAD, hypertension, AFib, LY, BPH, and recent cystoscopy for kidney stone removal presenting with hematuria. Patient underwent cystoscopy yesterday but no stone was found as it had passed. Patient noticed blood in his pants after discharge. Currently has a Rabago catheter with bloody output (750 cc in the bag). Patient denies pain, nausea, vomiting, fever, or chills. On Eliquis, which has been held for 3 days due to the procedure.  PE: NAD, MMM, lungs CTA, heart no MRG, no abdominal tenderness, no CVA tenderness, no LE edema  ddx: post operative complication     - Plan :  - Admit to virtual CDU for observation  - Monitor labs, particularly hemoglobin and hematocrit  - Continue Rabago catheter irrigation as needed  - Urology to follow up on lab results and determine further management  - Hold Eliquis for now, reassess anticoagulation needs    - Monitor for signs of urinary retention or infection : 84-year-old male with a history of CAD, hypertension, AFib, LY, BPH, and recent cystoscopy for kidney stone removal presenting with hematuria. Patient underwent cystoscopy yesterday but no stone was found as it had passed. Patient noticed blood in his pants after discharge. Currently has a Rabago catheter with bloody output (750 cc in the bag). Patient denies pain, nausea, vomiting, fever, or chills. On Eliquis, which has been held for 3 days due to the procedure.  PE: NAD, MMM, lungs CTA, heart no MRG, no abdominal tenderness, no CVA tenderness, no LE edema  ddx: post operative complication     - Plan :  - Admit to virtual CDU for observation  - Monitor labs, particularly hemoglobin and hematocrit  - Continue Rabago catheter irrigation as needed  - Urology to follow up on lab results and determine further management  - Hold Eliquis for now, reassess anticoagulation needs

## 2025-06-20 NOTE — ED CDU PROVIDER INITIAL DAY NOTE - OBJECTIVE STATEMENT
84-year-old male with PMH CAD with stents, Afib, HTN, HLD, LY, COPD, who presented to the Emergency Department with hematuria following a cystoscopy yesterday. The patient reports having undergone a cystoscopy yesterday to remove a 9-millimeter kidney stone. However, during the procedure, the stone was not found as it had already passed. He was discharged around 9:30 PM. Upon arriving home, the patient noticed blood in his pants and attempted to contact his healthcare providers. He was advised to come to the Emergency Department for evaluation. The patient denies any pain in his penis, shortness of breath, vomiting, fever, or chills. Has not been taking Eliquis for the last 3 days for procedure.

## 2025-06-20 NOTE — ED ADULT NURSE NOTE - NSFALLUNIVINTERV_ED_ALL_ED
Bed/Stretcher in lowest position, wheels locked, appropriate side rails in place/Call bell, personal items and telephone in reach/Instruct patient to call for assistance before getting out of bed/chair/stretcher/Non-slip footwear applied when patient is off stretcher/[place] to call system/Physically safe environment - no spills, clutter or unnecessary equipment/Purposeful proactive rounding/Room/bathroom lighting operational, light cord in reach Pt with left ankle distal fibular fracture s/p trip and fall. [place] home with orthopedic f/u.

## 2025-06-20 NOTE — ED ADULT NURSE REASSESSMENT NOTE - NS ED NURSE REASSESS COMMENT FT1
11.30 Am  Received the Pt from  KAVIN Menchaca  Pt is Observed for hematuria . Received the Pt A&OX 4 with Rabago placed outside  Pt obeys commands Susie N/V/D fever chills cp SOB   Comfort care & safety measures continued  IV site looks clean & dry no signs of infiltration noted pt denies  pain IV site .Pt is oriented to the unit Plan of care explained .  Pt is advised to call for help  call bell with in the reach pt verbalized the understanding .  pending CDU  MD tovar . GCS 15/15 A&OX 4 PERRLA  size 3 Strong upper & lower extremities steady gait  Pt is ambulatory & independent   No facial droop  No Hand Leg drop denies numbness tingling Rabago bad has 1500 cc of bloody urine . Rabago bag emptied . Due meds given   Plan of care ongoing

## 2025-06-20 NOTE — PROGRESS NOTE ADULT - ATTENDING COMMENTS
As above  See H&P 6/19/25 for details  received 1 unit PRBC 6/19/25 for pre-existing anemia present on admission and unrelated to surgery  HCT 32.6 post tranfusion  returned to hospital for Rabgao clogged with clots and not draining and bloody  urine remains bloody  Rabago catheter irrigated once again and moderate amount of clots returned until clear  Keep Rabago  observe till tomorrow  Irrigate Rabago for clots as needed  Will reassess in AM As above  See H&P 6/19/25 for details  received 1 unit PRBC 6/19/25 for pre-existing anemia present on admission and unrelated to surgery  HCT 32.6 post transfusion  returned to hospital for Rabago clogged with clots and not draining and bloody  urine remains bloody  Rabago catheter irrigated once again and moderate amount of clots returned until clear  Keep Rabago  observe till tomorrow  Irrigate Rabago for clots as needed  Will reassess in AM As above  See consult note 6/20/25 for details  received 1 unit PRBC 6/19/25 for pre-existing anemia present on admission and unrelated to surgery  HCT 32.6 post transfusion  returned to hospital for Rabago clogged with clots and not draining and bloody  urine remains bloody  Rabago catheter irrigated once again and moderate amount of clots returned until clear  Keep Rabago  observe till tomorrow  Irrigate Rabago for clots as needed  Will reassess in AM

## 2025-06-20 NOTE — ED PROVIDER NOTE - ATTENDING CONTRIBUTION TO CARE
84M here s/p L ureteroscopy, removal of left NT, and placement of L ureteral stent, found to have clot retention with existing 20F weiss, dark color in tube. Urology already at bedside. Hx c/b CAD s/p coronary stents x5 last 2019, paroxysmal afib s/p ablation 2017,2021, HTn.HLD, polymalgia rheumatica ,loop recorder implant, LY, COPD, anemia requiring several transfusions in May,  followed by lolly Andrew- possible mild MDS from bone marrow biopsy), s/p mitraclip 6/2024 for mitral regurgitation. Urology performed manual bladder irrigation was performed. ~25cc of clots was irrigated out from the weiss, color was irrigated to light peach that darkened to light strawberry within 15-20min.  Eliquis has been held for 3 days already with plan for continued holding of AC for another upcoming procedure.     Hemodynamically stable. appears uncomfortable but urology at bedside.. AAOx4 (person, place, time, event). PERRL 3mm, EOMI w/o nystagmus, well hydrated, no audible cardiac murmurs. clear lungs, no inc work of breathing. benign abdomen, - andrew, no peritoneal signs, no cva ttp. no visible rashes nor deformities, no signs of jaundice, fluid overload, nor anemia. symm calves, no edema. full str/rom/neurovasc all 4 extrem. weiss cath in place, 750cc of bloody urine in bag.    Acute hematuria secondary to recent procedure with clot in the Weiss catheter tube.  Urology already irrigated.  The recommendation is to check for objective anemia, observation to monitor urine color.  Continue holding the Eliquis at this time.  Patient tolerating p.o. hydration.  Will avoid IV fluids given history of coronary artery disease and heart dysfunction.  Summary of presentation, physical exam findings, plan, expected turn around time for diagnostics discussed with patient. Agrees.

## 2025-06-20 NOTE — ED CDU PROVIDER INITIAL DAY NOTE - PROGRESS NOTE DETAILS
Ultrasound resulted, urology made aware. - Meri Núñez PA-C Patient evaluated at bedside.  He reports that he is feeling well.  Urine in Rabago bag is wine colored.  Repeat CBC pending.  Will await CBC and further urology recommendations. -Jose Luis Becker PA-C Per urology note, "continue diflucan and augmentin." Patient stated yesterday that his last dose of Diflucan was today- given earlier today per chart review. Patient was sent rx for Augmentin 500-125 per chart review, reporting to have not picked up medication. Will order dose. - Meri Núñez PA-C

## 2025-06-20 NOTE — ED ADULT NURSE NOTE - OBJECTIVE STATEMENT
Pt is a 83y/o M PMH BPH, anemia, HTN, COPD, kidney stone CAD presenting to [facility] ED from triage for urinary catheter complication. Pt endorsing having a kidney surgical procedure today to remove a stone, since pt endorsing hematuria, clots in tubing and irritation. Pt was referred by urologist who preformed procedure to be seen in ED. Upon physical assessment, hematuria with clots noted in tubing, urology PA at bedside to irrigate catheter, pt skin warm and dry, alert and oriented, airway patent, afebrile. Pt is A&Ox4 currently denying any HA, visual deficits, SOB, cough, CP, palpitations, abd pain, n/v/d/c, fever/chills, weakness/fatigue. Pt ambulates independently at baseline. Bedrail's up and comfort measures provided

## 2025-06-20 NOTE — ED PROVIDER NOTE - OBJECTIVE STATEMENT
84-year-old male who presented to the Emergency Department at approximately 3 AM with hematuria following a cystoscopy earlier in the day. The patient reports having undergone a cystoscopy yesterday to remove a 9-millimeter kidney stone. However, during the procedure, the stone was not found as it had already passed. He was discharged around 9:30 PM. Upon arriving home, the patient noticed blood in his pants and attempted to contact his healthcare providers. He was advised to come to the Emergency Department for evaluation. The patient denies any pain in his penis, nausea, vomiting, fever, or chills.    Past Medical History:  - Mitral valve repair    - Cataract surgery    - Loop recorder implantation    - Coronary Artery Disease (CAD)    - Hypertension    - Proximal Atrial Fibrillation (AFib)    - Obstructive Sleep Apnea (LY)    - Bone marrow biopsy    - Benign Prostatic Hyperplasia (BPH)    - History of kidney stones    Past Surgical History:  - Mitral valve repair    - Cataract surgery    - Loop recorder implantation    - Bone marrow biopsy    - Recent cystoscopy    Medications:  - Eliquis (held for 3 days due to procedure)    - Timolol    - Amlodipine    Family History:  -    Social History:  -    Review of Systems:  - Genitourinary: Positive for hematuria    - Constitutional: Negative for fever, chills    - Gastrointestinal: Negative for nausea, vomiting    - Genitourinary: Negative for penile pain

## 2025-06-20 NOTE — CONSULT NOTE ADULT - ASSESSMENT
84y Male with PMHx of CAD s/p coronary stents x5 last 2019, paroxysmal afib s/p ablation 2017,2021, HTn.HLD, polymalgia rheumatica ,loop recorder implant, LY, COPD, anemia requiring several transfusions in May,  followed by heme Dr. Andrew- possible mild MDS from bone marrow biopsy, with initial dose aranesp to be given this week), s/p mitraclip 6/2024 for mitral regurgiation. pt with recent hospitalization [facility] 4/30/25-5/13/25 for left kidney stone, failed attempt to stent, now for left ureteroscopy laser lithotripsy with stent insertion on 6/19/25 with second procedure 6/26/25 for laser enucleation of prostate with morcellation/cystoscopy.   Patient is now s/p L URS, removal of L NT, placement of L ureteral stent. Presenting with clot retention with existing 20F weiss. dark wine colored urine in tubing initially, then manual bladder irrigation was performed. ~25cc of clots was irrigated out from the weiss, color was irrigated to light peach that darkened to light strawberry within 15-20min.     Recs  - Continue to monitor urine color and urine output  - Strict ins and outs  - Monitor vitals and H/H, transfuse PRN  - Encourage hydration, IVFs  - RBUS to assess for clot burden   - Please admit to CDU for further evaluation of urine color   - Please repage urology if the patient experiences clot obstruction again     Discussed with Dr. Phelps     [facility]  [address] [place] [address], [address] [address] [phone]

## 2025-06-20 NOTE — ED CDU PROVIDER INITIAL DAY NOTE - DETAILS
I+O repeat labs  monitor urine color   OK to give Aspirin  urology following  DW lester, vitals every 4 hours, frequent reevaluations I+O repeat labs  monitor urine color   no IVF  OK to give Aspirin  urology following  DW lester, vitals every 4 hours, frequent reevaluations

## 2025-06-20 NOTE — PROGRESS NOTE ADULT - ASSESSMENT
84y Male with PMHx of CAD s/p coronary stents x5 last 2019, paroxysmal afib s/p ablation 2017,2021, HTn.HLD, polymalgia rheumatica ,loop recorder implant, LY, COPD, anemia requiring several transfusions in May,  followed by heme Dr. Andrew- possible mild MDS from bone marrow biopsy, with initial dose aranesp to be given this week), s/p mitraclip 6/2024 for mitral regurgiation. pt with recent hospitalization [facility] 4/30/25-5/13/25 for left kidney stone, failed attempt to stent, now for left ureteroscopy laser lithotripsy with stent insertion on 6/19/25 with second procedure 6/26/25 for laser enucleation of prostate with morcellation/cystoscopy.   Patient is now s/p L URS, removal of L NT, placement of L ureteral stent. Presenting with clot retention with existing 20F weiss. dark wine colored urine in tubing initially, then manual bladder irrigation was performed. ~25cc of clots was irrigated out from the weiss, color was irrigated to light peach that darkened to light strawberry within 15-20min.     6/20: ***INCOMPLETE****    Recs  - Continue to monitor urine color and urine output  - Strict ins and outs  - Monitor vitals and H/H, transfuse PRN  - Encourage hydration, IVFs  - RBUS to assess for clot burden     - Please repage urology if the patient experiences clot obstruction again     Discussed with Dr. Phelps     [facility]  [address] [place] [address], [place] [address]  [phone]    84y Male with PMHx of CAD s/p coronary stents x5 last 2019, paroxysmal afib s/p ablation 2017,2021, HTn.HLD, polymalgia rheumatica ,loop recorder implant, LY, COPD, anemia requiring several transfusions in May,  followed by heme Dr. Andrew- possible mild MDS from bone marrow biopsy, with initial dose aranesp to be given this week), s/p mitraclip 6/2024 for mitral regurgiation. pt with recent hospitalization [facility] 4/30/25-5/13/25 for left kidney stone, failed attempt to stent, now for left ureteroscopy laser lithotripsy with stent insertion on 6/19/25 with second procedure 6/26/25 for laser enucleation of prostate with morcellation/cystoscopy.   Patient is now s/p L URS, removal of L NT, placement of L ureteral stent. Presenting with clot retention with existing 20F weiss. dark wine colored urine in tubing initially, then manual bladder irrigation was performed. ~25cc of clots was irrigated out from the weiss, color was irrigated to light peach that darkened to light strawberry within 15-20min.     6/20: urine color improved to light watermelon. Patient would like to stay overnight to montior urine color. RBUS with decompressed bladder     Recs  - urine color clear watermelon, no indication for CBI at this time  - Continue to monitor urine color and urine output  - irrigate PRN  - Strict ins and outs  - Monitor vitals and H/H, transfuse PRN  - Encourage hydration, IVFs  - will reassess urine color in AM     - Please repage urology if the patient experiences clot obstruction again     Discussed with Dr. Stevens    [facility]  [address] [place] [address], [address]  [place] [address] [phone]    84y Male with PMHx of CAD s/p coronary stents x5 last 2019, paroxysmal afib s/p ablation 2017,2021, HTn.HLD, polymalgia rheumatica ,loop recorder implant, LY, COPD, anemia requiring several transfusions in May,  followed by heme Dr. Andrew- possible mild MDS from bone marrow biopsy, with initial dose aranesp to be given this week), s/p mitraclip 6/2024 for mitral regurgiation. pt with recent hospitalization [facility] 4/30/25-5/13/25 for left kidney stone, failed attempt to stent, now for left ureteroscopy laser lithotripsy with stent insertion on 6/19/25 with second procedure 6/26/25 for laser enucleation of prostate with morcellation/cystoscopy.   Patient is now s/p L URS, removal of L NT, placement of L ureteral stent. Presenting with clot retention with existing 20F weiss. dark wine colored urine in tubing initially, then manual bladder irrigation was performed. ~25cc of clots was irrigated out from the weiss, color was irrigated to light peach that darkened to light strawberry within 15-20min.     6/20: urine color improved to light watermelon. Patient would like to stay overnight to montior urine color. RBUS with decompressed bladder     Recs  - urine color clear watermelon, no indication for CBI at this time  - Continue to monitor urine color and urine output  - please continue diflucan and augmentin  - irrigate PRN  - Strict ins and outs  - Monitor vitals and H/H, transfuse PRN  - Encourage hydration, IVFs  - will reassess urine color in AM     - Please repage urology if the patient experiences clot obstruction again     Discussed with Dr. Stevens    [facility]  [address] [place] [address], [address]  [place] [address] [phone]

## 2025-06-20 NOTE — ED PROVIDER NOTE - PHYSICAL EXAMINATION
Vital signs reviewed.  CONSTITUTIONAL: breathing, NAD, calm, underwear and pants in corner of room have a smattering of blood on them   HEAD: Normocephalic; atraumatic  EYES: EOMI, PERRL, no conjunctival injection, no scleral icterus  MOUTH/THROAT:  MMM  NECK: Trachea midline, no JVD  CV: Normal S1, S2; no audible murmurs  RESP: normal work of breathing, CTA b/l  ABD: soft, non-distended; non-tender to palpation   : Deferred  MSK/EXT: no LE edema, no limited ROM, No CVA tenderness  SKIN: No rashes on exposed skin surfaces  NEURO: Moves all extremities spontaneously with no focal deficits, speech is appropriate  PSYCH: calm

## 2025-06-21 VITALS
DIASTOLIC BLOOD PRESSURE: 70 MMHG | HEART RATE: 72 BPM | OXYGEN SATURATION: 95 % | SYSTOLIC BLOOD PRESSURE: 134 MMHG | TEMPERATURE: 97 F | RESPIRATION RATE: 18 BRPM

## 2025-06-21 PROBLEM — N20.0 CALCULUS OF KIDNEY: Chronic | Status: ACTIVE | Noted: 2025-06-02

## 2025-06-21 PROBLEM — I48.0 PAROXYSMAL ATRIAL FIBRILLATION: Chronic | Status: ACTIVE | Noted: 2025-06-02

## 2025-06-21 PROBLEM — Z98.890 OTHER SPECIFIED POSTPROCEDURAL STATES: Chronic | Status: ACTIVE | Noted: 2025-06-02

## 2025-06-21 PROBLEM — D64.9 ANEMIA, UNSPECIFIED: Chronic | Status: ACTIVE | Noted: 2025-06-02

## 2025-06-21 LAB
ANION GAP SERPL CALC-SCNC: 10 MMOL/L — SIGNIFICANT CHANGE UP (ref 5–17)
APPEARANCE UR: CLEAR — SIGNIFICANT CHANGE UP
BACTERIA # UR AUTO: NEGATIVE /HPF — SIGNIFICANT CHANGE UP
BILIRUB UR-MCNC: NEGATIVE — SIGNIFICANT CHANGE UP
BUN SERPL-MCNC: 13 MG/DL — SIGNIFICANT CHANGE UP (ref 7–23)
CALCIUM SERPL-MCNC: 9 MG/DL — SIGNIFICANT CHANGE UP (ref 8.4–10.5)
CAST: 0 /LPF — SIGNIFICANT CHANGE UP (ref 0–4)
CHLORIDE SERPL-SCNC: 105 MMOL/L — SIGNIFICANT CHANGE UP (ref 96–108)
CO2 SERPL-SCNC: 27 MMOL/L — SIGNIFICANT CHANGE UP (ref 22–31)
COLOR SPEC: ABNORMAL
CREAT SERPL-MCNC: 0.77 MG/DL — SIGNIFICANT CHANGE UP (ref 0.5–1.3)
DIFF PNL FLD: ABNORMAL
EGFR: 88 ML/MIN/1.73M2 — SIGNIFICANT CHANGE UP
EGFR: 88 ML/MIN/1.73M2 — SIGNIFICANT CHANGE UP
GLUCOSE SERPL-MCNC: 95 MG/DL — SIGNIFICANT CHANGE UP (ref 70–99)
GLUCOSE UR QL: NEGATIVE MG/DL — SIGNIFICANT CHANGE UP
HCT VFR BLD CALC: 31.3 % — LOW (ref 39–50)
HGB BLD-MCNC: 9.6 G/DL — LOW (ref 13–17)
KETONES UR QL: NEGATIVE MG/DL — SIGNIFICANT CHANGE UP
LEUKOCYTE ESTERASE UR-ACNC: ABNORMAL
MCHC RBC-ENTMCNC: 29.3 PG — SIGNIFICANT CHANGE UP (ref 27–34)
MCHC RBC-ENTMCNC: 30.7 G/DL — LOW (ref 32–36)
MCV RBC AUTO: 95.4 FL — SIGNIFICANT CHANGE UP (ref 80–100)
NITRITE UR-MCNC: NEGATIVE — SIGNIFICANT CHANGE UP
NRBC # BLD AUTO: 0 K/UL — SIGNIFICANT CHANGE UP (ref 0–0)
NRBC # FLD: 0 K/UL — SIGNIFICANT CHANGE UP (ref 0–0)
NRBC BLD AUTO-RTO: 0 /100 WBCS — SIGNIFICANT CHANGE UP (ref 0–0)
PH UR: 7 — SIGNIFICANT CHANGE UP (ref 5–8)
PLATELET # BLD AUTO: 168 K/UL — SIGNIFICANT CHANGE UP (ref 150–400)
PMV BLD: 8.7 FL — SIGNIFICANT CHANGE UP (ref 7–13)
POTASSIUM SERPL-MCNC: 3.3 MMOL/L — LOW (ref 3.5–5.3)
POTASSIUM SERPL-SCNC: 3.3 MMOL/L — LOW (ref 3.5–5.3)
PROT UR-MCNC: SIGNIFICANT CHANGE UP MG/DL
RBC # BLD: 3.28 M/UL — LOW (ref 4.2–5.8)
RBC # FLD: 21.6 % — HIGH (ref 10.3–14.5)
RBC CASTS # UR COMP ASSIST: 151 /HPF — HIGH (ref 0–4)
SODIUM SERPL-SCNC: 142 MMOL/L — SIGNIFICANT CHANGE UP (ref 135–145)
SP GR SPEC: <1.005 — LOW (ref 1–1.03)
SQUAMOUS # UR AUTO: 1 /HPF — SIGNIFICANT CHANGE UP (ref 0–5)
UROBILINOGEN FLD QL: 1 MG/DL — SIGNIFICANT CHANGE UP (ref 0.2–1)
WBC # BLD: 4.49 K/UL — SIGNIFICANT CHANGE UP (ref 3.8–10.5)
WBC # FLD AUTO: 4.49 K/UL — SIGNIFICANT CHANGE UP (ref 3.8–10.5)
WBC UR QL: 17 /HPF — HIGH (ref 0–5)

## 2025-06-21 PROCEDURE — 99284 EMERGENCY DEPT VISIT MOD MDM: CPT | Mod: 25

## 2025-06-21 PROCEDURE — 80053 COMPREHEN METABOLIC PANEL: CPT

## 2025-06-21 PROCEDURE — 80048 BASIC METABOLIC PNL TOTAL CA: CPT

## 2025-06-21 PROCEDURE — 86900 BLOOD TYPING SEROLOGIC ABO: CPT

## 2025-06-21 PROCEDURE — 85025 COMPLETE CBC W/AUTO DIFF WBC: CPT

## 2025-06-21 PROCEDURE — 85610 PROTHROMBIN TIME: CPT

## 2025-06-21 PROCEDURE — 99238 HOSP IP/OBS DSCHRG MGMT 30/<: CPT

## 2025-06-21 PROCEDURE — 81001 URINALYSIS AUTO W/SCOPE: CPT

## 2025-06-21 PROCEDURE — 76770 US EXAM ABDO BACK WALL COMP: CPT

## 2025-06-21 PROCEDURE — G0378: CPT

## 2025-06-21 PROCEDURE — 86922 COMPATIBILITY TEST ANTIGLOB: CPT

## 2025-06-21 PROCEDURE — 87086 URINE CULTURE/COLONY COUNT: CPT

## 2025-06-21 PROCEDURE — 85730 THROMBOPLASTIN TIME PARTIAL: CPT

## 2025-06-21 PROCEDURE — 85027 COMPLETE CBC AUTOMATED: CPT

## 2025-06-21 PROCEDURE — 86901 BLOOD TYPING SEROLOGIC RH(D): CPT

## 2025-06-21 PROCEDURE — 86850 RBC ANTIBODY SCREEN: CPT

## 2025-06-21 RX ADMIN — Medication 81 MILLIGRAM(S): at 11:20

## 2025-06-21 RX ADMIN — FUROSEMIDE 40 MILLIGRAM(S): 10 INJECTION INTRAMUSCULAR; INTRAVENOUS at 05:54

## 2025-06-21 RX ADMIN — Medication 40 MILLIEQUIVALENT(S): at 11:19

## 2025-06-21 RX ADMIN — OXYBUTYNIN CHLORIDE 5 MILLIGRAM(S): 5 TABLET, FILM COATED, EXTENDED RELEASE ORAL at 05:54

## 2025-06-21 RX ADMIN — AMOXICILLIN AND CLAVULANATE POTASSIUM 1 TABLET(S): 500; 125 TABLET, FILM COATED ORAL at 05:54

## 2025-06-21 RX ADMIN — FINASTERIDE 5 MILLIGRAM(S): 1 TABLET, FILM COATED ORAL at 11:19

## 2025-06-21 NOTE — ED ADULT NURSE REASSESSMENT NOTE - NS ED NURSE REASSESS COMMENT FT1
07.00 Received the Pt from  RN Jonh Garber Pt is Observed for Hematuria  S/P instrumentation of urinary system  with Rabago catheter from home . Received the Pt A&OX 4  Pt obeys commands Susie N/V/D fever chills cp SOB   Comfort care & safety measures continued  IV site looks clean & dry no signs of infiltration noted pt denies  pain IV site .Pt is oriented to the unit Plan of care explained .  Pt is advised to call for help  call bell with in the reach pt verbalized the understanding .  pending CDU  MD tovar . GCS 15/15 A&OX 4 PERRLA  size 3 Strong upper & lower extremities steady gait  Pt is ambulatory & independent  fall precautions are in place  Pt drains blood tinged urine  Rabago bag emptied  Pt is resting no signs of distress seen  No facial droop  No Hand Leg drop denies numbness tingling  Plan of care ongoing

## 2025-06-21 NOTE — ED CDU PROVIDER SUBSEQUENT DAY NOTE - PHYSICAL EXAMINATION
CONSTITUTIONAL: A+Ox3 NAD, calm  		HEAD: Normocephalic; atraumatic  		EYES: no conjunctival injection, no scleral icterus  		MOUTH/THROAT:  MMM  		CV: Normal S1, S2; no audible murmurs  		RESP: normal work of breathing, CTA b/l  		ABD: soft, non-distended; non-tender to palpation   		: +weiss in place   		MSK/EXT:+ bilateral lower extremity edema   		NEURO: Moves all extremities spontaneously, speech is appropriate  PSYCH: calm

## 2025-06-21 NOTE — ED ADULT NURSE REASSESSMENT NOTE - NSFALLHARMRISKINTERV_ED_ALL_ED

## 2025-06-21 NOTE — ED CDU PROVIDER DISPOSITION NOTE - ATTENDING APP SHARED VISIT CONTRIBUTION OF CARE
Attending MD Brown:   I personally have seen and examined this patient.  Physician assistant note reviewed and agree on plan of care and except where noted.  See below for details.     Seen in CDU Bryce 59    84M with PMH/PSH including CAD s/p stents, AFib, HTN, HLD, LY, COPD sent to the CDU after presenting to the ED with hematuria.  Reports urinary output appears improved.  Denies flank pain, fever. Denies chest pain, shortness of breath, abdominal pain, nausea, vomiting, diarrhea.  Seen by uro who recommend discharge and outpatient uro, urine appearance improved per uro.    Exam:   General: calm  HENT: head NCAT, airway patent   Chest: symmetric chest rise, no increased work of breathing  MSK: ranging neck freely, LE edema  Abd: soft, NT, ND  : chaperoned by LUNA Cagle, isela in place, urine in bag light peach color  Neuro: moving all extremities spontaneously, sensory grossly intact, no gross neuro deficits  Psych: normal mood and affect     A/P: 84M with hematuria, improved. No acute issues at  this time.  Lab and radiology tests reviewed with patient.  +incidental findings, discussed with patient.  Patient stable for discharge. Follow up instructions given, importance of follow up emphasized, return to ED parameters reviewed and patient verbalized understanding.  All questions answered, all concerns addressed. Print out of available labs and imaging will be given to patient as part of their discharge paperwork.

## 2025-06-21 NOTE — PROGRESS NOTE ADULT - ASSESSMENT
84y Male with PMHx of CAD s/p coronary stents x5 last 2019, paroxysmal afib s/p ablation 2017,2021, HTn.HLD, polymalgia rheumatica ,loop recorder implant, LY, COPD, anemia requiring several transfusions in May,  followed by heme Dr. Andrew- possible mild MDS from bone marrow biopsy, with initial dose aranesp to be given this week), s/p mitraclip 6/2024 for mitral regurgiation. pt with recent hospitalization [facility] 4/30/25-5/13/25 for left kidney stone, failed attempt to stent, now for left ureteroscopy laser lithotripsy with stent insertion on 6/19/25 with second procedure 6/26/25 for laser enucleation of prostate with morcellation/cystoscopy.   Patient is now s/p L URS, removal of L NT, placement of L ureteral stent. Presenting with clot retention with existing 20F weiss. dark wine colored urine in tubing initially, then manual bladder irrigation was performed. ~25cc of clots was irrigated out from the weiss, color was irrigated to light peach that darkened to light strawberry within 15-20min.     6/20: urine color improved to light watermelon. Patient would like to stay overnight to montior urine color. RBUS with decompressed bladder     Recs  - No acute urological intervention indicated. Weiss draining crystal clear urine. Ok to dc with weiss catheter and f/u outpatient with Dr. Stveens.   - Continue to monitor urine color and urine output  - please continue diflucan and augmentin  - irrigate PRN  - Strict ins and outs

## 2025-06-21 NOTE — PROGRESS NOTE ADULT - SUBJECTIVE AND OBJECTIVE BOX
Subjective  AVS. Doing well.     Objective    Vital signs  T(F): , Max: 97.6 (06-20-25 @ 13:07)  HR: 71 (06-21-25 @ 08:05)  BP: 137/70 (06-21-25 @ 08:05)  SpO2: 94% (06-21-25 @ 08:05)  Wt(kg): --    Output     OUT:    Indwelling Catheter - Urethral (mL): 2500 mL  Total OUT: 2500 mL    Total NET: -2500 mL      OUT:    Indwelling Catheter - Urethral (mL): 3000 mL  Total OUT: 3000 mL    Total NET: -3000 mL          Gen: NAD  : weiss secured in place, draining CYU.     Labs      06-21 @ 06:34    WBC 4.49  / Hct 31.3  / SCr 0.77     06-20 @ 22:30    WBC 5.83  / Hct 31.3  / SCr --           Imaging

## 2025-06-21 NOTE — ED CDU PROVIDER DISPOSITION NOTE - NSFOLLOWUPINSTRUCTIONS_ED_ALL_ED_FT
Hydrate.     ***ABX     We recommend you follow up with your primary care provider within the next 2-3 days, please bring all of your results with you. You can also follow up with .****UROLOGY     Please return to the Emergency Department with new, worsening, or concerning symptoms, such as:  -Bleeding  -Severe abdominal pain  -Facial drooping, arm weakness, or speech difficulty   -Head injury or loss of consciousness   -Nonstop bleeding or an open wound     *More detailed information regarding your visit and discharge can be found by reviewing this packet Follow-up with urologist Dr. Stevens within the next 1 week for re-evaluation. Please discuss your weiss catheter at your appointment.    Gerardo Stevens  Urology  [address] [place] [address], [address]  [place] [address]  Phone: [phone]  Fax: [phone]  Follow Up Time: 4-6 Days    Additionally please follow up with your primary care doctor in 2-3 days for re-evaluation.     The ultrasound you had performed in the emergency department had an incidental finding of kidney cysts as well as "a left interpolar isoechoic region protruding from the cortex demonstrates vascularity may reflect lobulated parenchyma versus lesion".  It is recommended you discuss this finding with your urologist/PMD as you may warrant a MRI as outpatient for further evaluation regarding this finding.    Rest, stay hydrated.  Continue current home medications as previously prescribed including fluconazole.    Take antibiotic Augmentin as prescribed.    Return to the Emergency Department immediately if you develop any new/worsening symptoms including but not limited to recurrent blood in your urine/Weiss catheter, fever, pain, vomiting, no output from Weiss catheter or any other concerns.    *More detailed information regarding your visit and discharge can be found by reviewing this packet

## 2025-06-21 NOTE — ED CDU PROVIDER DISPOSITION NOTE - CARE PROVIDER_API CALL
Gerardo Stevens  Urology  [address] [place] [id number]  Phone: [phone]  Fax: [phone]  Follow Up Time: 4-6 Days

## 2025-06-21 NOTE — ED CDU PROVIDER DISPOSITION NOTE - CLINICAL COURSE
84-year-old male with PMH CAD with stents, Afib, HTN, HLD, LY, COPD, who presented to the Emergency Department with hematuria following a cystoscopy yesterday. The patient reports having undergone a cystoscopy yesterday to remove a 9-millimeter kidney stone. However, during the procedure, the stone was not found as it had already passed. He was discharged around 9:30 PM. Upon arriving home, the patient noticed blood in his pants and attempted to contact his healthcare providers. He was advised to come to the Emergency Department for evaluation. The patient denies any pain in his penis, shortness of breath, vomiting, fever, or chills. Has not been taking Eliquis for the last 3 days for procedure. 84-year-old male with PMH CAD with stents, Afib, HTN, HLD, LY, COPD, who presented to the Emergency Department with hematuria following a cystoscopy yesterday. The patient reports having undergone a cystoscopy yesterday to remove a 9-millimeter kidney stone. However, during the procedure, the stone was not found as it had already passed. He was discharged around 9:30 PM. Upon arriving home, the patient noticed blood in his pants and attempted to contact his healthcare providers. He was advised to come to the Emergency Department for evaluation. The patient denies any pain in his penis, shortness of breath, vomiting, fever, or chills. Has not been taking Eliquis for the last 3 days for procedure. Patient had gradual improvement in urine color overnight while in CDU.  By a.m. reevaluation with ED attending his urine was light peach colored.  Serial H/H remained stable.  He was re-evaluated by the urology team who advised he could be discharged home with outpatient urology follow-up.  Case discussed with ED attending Dr. Brown, patient stable at discharge.

## 2025-06-21 NOTE — ED CDU PROVIDER SUBSEQUENT DAY NOTE - PROGRESS NOTE DETAILS
Patient seen at bedside with ED attending Dr. Brown, reports feeling well this morning. Light peach colored urine noted in Rabago bag.  Denies pain, fever/chills.  Awaiting re-evaluation by urology team.  - Sea Cagle PA-C Patient was re-evaluated by urology team they state urine color improving from their standpoint and are okay with discharge home.  Discussed all results with patient at bedside who acknowledged understanding and feels comfortable plan for discharge home and outpatient follow-up with Dr. Stevens. Additionally I made him aware of incidental finding of left interpolar isoechoic region on kidney ultrasound that may reflect lobulated parenchyma versus lesion and recommended outpatient follow-up with Dr. Stevens to discuss nonemergent MRI. He acknowledged understanding. D/w ED attending avtar Paul for discharge home.  - Sea Cagle PA-C

## 2025-06-21 NOTE — ED CDU PROVIDER SUBSEQUENT DAY NOTE - ATTENDING APP SHARED VISIT CONTRIBUTION OF CARE
Attending MD Brown:   I personally have seen and examined this patient.  Physician assistant note reviewed and agree on plan of care and except where noted.  See below for details.     Seen in CDU [facility] [address]    84M with PMH/PSH including CAD s/p stents, AFib, HTN, HLD, LY, COPD sent to the CDU after presenting to the ED with hematuria.  Reports urinary output appears improved.  Denies flank pain, fever. Denies chest pain, shortness of breath, abdominal pain, nausea, vomiting, diarrhea.  Seen by uro who recommend discharge and outpatient uro, urine appearance improved per uro.    Exam:   General: calm  HENT: head NCAT, airway patent   Chest: symmetric chest rise, no increased work of breathing  MSK: ranging neck freely, LE edema  Abd: soft, NT, ND  : chaperoned by LUNA Cagle, isela in place, urine in bag light peach color  Neuro: moving all extremities spontaneously, sensory grossly intact, no gross neuro deficits  Psych: normal mood and affect     A/P: 84M with hematuria, improved. No acute issues at  this time.  Lab and radiology tests reviewed with patient.  +incidental findings, discussed with patient.  Patient stable for discharge. Follow up instructions given, importance of follow up emphasized, return to ED parameters reviewed and patient verbalized understanding.  All questions answered, all concerns addressed. Print out of available labs and imaging will be given to patient as part of their discharge paperwork.

## 2025-06-21 NOTE — ED CDU PROVIDER DISPOSITION NOTE - PATIENT PORTAL LINK FT
You can access the FollowMyHealth Patient Portal offered by [facility] by registering at the following website: [email or web address]. By joining [facility]’s FollowMyHealth portal, you will also be able to view your health information using other applications (apps) compatible with our system.

## 2025-06-21 NOTE — ED CDU PROVIDER SUBSEQUENT DAY NOTE - HISTORY
No interval changes since initial CDU provider note. Pt feels well without new complaint. NAD VSS. H/H stable. Will continue to monitor.  Nesha Núñez

## 2025-06-23 LAB
CULTURE RESULTS: NO GROWTH — SIGNIFICANT CHANGE UP
SPECIMEN SOURCE: SIGNIFICANT CHANGE UP

## 2025-06-23 RX ORDER — AMOXICILLIN AND CLAVULANATE POTASSIUM 875; 125 MG/1; MG/1
875-125 TABLET, COATED ORAL
Qty: 6 | Refills: 0 | Status: ACTIVE | COMMUNITY
Start: 2025-06-23 | End: 1900-01-01

## 2025-06-26 ENCOUNTER — INPATIENT (INPATIENT)
Facility: HOSPITAL | Age: 85
LOS: 5 days | Discharge: HOME HEALTH | DRG: 694 | End: 2025-07-02
Attending: UROLOGY | Admitting: UROLOGY
Payer: MEDICARE

## 2025-06-26 ENCOUNTER — APPOINTMENT (OUTPATIENT)
Dept: UROLOGY | Facility: HOSPITAL | Age: 85
End: 2025-06-26

## 2025-06-26 ENCOUNTER — RESULT REVIEW (OUTPATIENT)
Age: 85
End: 2025-06-26

## 2025-06-26 ENCOUNTER — TRANSCRIPTION ENCOUNTER (OUTPATIENT)
Age: 85
End: 2025-06-26

## 2025-06-26 VITALS
TEMPERATURE: 98 F | WEIGHT: 201.94 LBS | SYSTOLIC BLOOD PRESSURE: 165 MMHG | RESPIRATION RATE: 16 BRPM | HEIGHT: 72 IN | HEART RATE: 79 BPM | OXYGEN SATURATION: 96 % | DIASTOLIC BLOOD PRESSURE: 89 MMHG

## 2025-06-26 DIAGNOSIS — Z98.49 CATARACT EXTRACTION STATUS, UNSPECIFIED EYE: Chronic | ICD-10-CM

## 2025-06-26 DIAGNOSIS — N20.0 CALCULUS OF KIDNEY: ICD-10-CM

## 2025-06-26 DIAGNOSIS — Z98.890 OTHER SPECIFIED POSTPROCEDURAL STATES: Chronic | ICD-10-CM

## 2025-06-26 DIAGNOSIS — Z96.652 PRESENCE OF LEFT ARTIFICIAL KNEE JOINT: Chronic | ICD-10-CM

## 2025-06-26 LAB
ANION GAP SERPL CALC-SCNC: 10 MMOL/L — SIGNIFICANT CHANGE UP (ref 5–17)
ANION GAP SERPL CALC-SCNC: 13 MMOL/L — SIGNIFICANT CHANGE UP (ref 5–17)
APTT BLD: 30 SEC — SIGNIFICANT CHANGE UP (ref 26.1–36.8)
BLD GP AB SCN SERPL QL: POSITIVE — SIGNIFICANT CHANGE UP
BUN SERPL-MCNC: 13 MG/DL — SIGNIFICANT CHANGE UP (ref 7–23)
BUN SERPL-MCNC: 14 MG/DL — SIGNIFICANT CHANGE UP (ref 7–23)
CALCIUM SERPL-MCNC: 7.7 MG/DL — LOW (ref 8.4–10.5)
CALCIUM SERPL-MCNC: 8 MG/DL — LOW (ref 8.4–10.5)
CHLORIDE SERPL-SCNC: 109 MMOL/L — HIGH (ref 96–108)
CHLORIDE SERPL-SCNC: 110 MMOL/L — HIGH (ref 96–108)
CO2 SERPL-SCNC: 19 MMOL/L — LOW (ref 22–31)
CO2 SERPL-SCNC: 20 MMOL/L — LOW (ref 22–31)
CREAT SERPL-MCNC: 0.73 MG/DL — SIGNIFICANT CHANGE UP (ref 0.5–1.3)
CREAT SERPL-MCNC: 0.76 MG/DL — SIGNIFICANT CHANGE UP (ref 0.5–1.3)
EGFR: 89 ML/MIN/1.73M2 — SIGNIFICANT CHANGE UP
EGFR: 89 ML/MIN/1.73M2 — SIGNIFICANT CHANGE UP
EGFR: 90 ML/MIN/1.73M2 — SIGNIFICANT CHANGE UP
EGFR: 90 ML/MIN/1.73M2 — SIGNIFICANT CHANGE UP
GLUCOSE SERPL-MCNC: 134 MG/DL — HIGH (ref 70–99)
GLUCOSE SERPL-MCNC: 183 MG/DL — HIGH (ref 70–99)
HCT VFR BLD CALC: 25.4 % — LOW (ref 39–50)
HCT VFR BLD CALC: 31.7 % — LOW (ref 39–50)
HGB BLD-MCNC: 7.8 G/DL — LOW (ref 13–17)
HGB BLD-MCNC: 9.6 G/DL — LOW (ref 13–17)
INR BLD: 1.28 RATIO — HIGH (ref 0.85–1.16)
MCHC RBC-ENTMCNC: 28.4 PG — SIGNIFICANT CHANGE UP (ref 27–34)
MCHC RBC-ENTMCNC: 29.1 PG — SIGNIFICANT CHANGE UP (ref 27–34)
MCHC RBC-ENTMCNC: 30.3 G/DL — LOW (ref 32–36)
MCHC RBC-ENTMCNC: 30.7 G/DL — LOW (ref 32–36)
MCV RBC AUTO: 93.8 FL — SIGNIFICANT CHANGE UP (ref 80–100)
MCV RBC AUTO: 94.8 FL — SIGNIFICANT CHANGE UP (ref 80–100)
NRBC # BLD AUTO: 0 K/UL — SIGNIFICANT CHANGE UP (ref 0–0)
NRBC # BLD AUTO: 0 K/UL — SIGNIFICANT CHANGE UP (ref 0–0)
NRBC # FLD: 0 K/UL — SIGNIFICANT CHANGE UP (ref 0–0)
NRBC # FLD: 0 K/UL — SIGNIFICANT CHANGE UP (ref 0–0)
NRBC BLD AUTO-RTO: 0 /100 WBCS — SIGNIFICANT CHANGE UP (ref 0–0)
NRBC BLD AUTO-RTO: 0 /100 WBCS — SIGNIFICANT CHANGE UP (ref 0–0)
PLATELET # BLD AUTO: 151 K/UL — SIGNIFICANT CHANGE UP (ref 150–400)
PLATELET # BLD AUTO: 154 K/UL — SIGNIFICANT CHANGE UP (ref 150–400)
PMV BLD: 8.5 FL — SIGNIFICANT CHANGE UP (ref 7–13)
PMV BLD: 8.5 FL — SIGNIFICANT CHANGE UP (ref 7–13)
POTASSIUM SERPL-MCNC: 3.9 MMOL/L — SIGNIFICANT CHANGE UP (ref 3.5–5.3)
POTASSIUM SERPL-MCNC: 4 MMOL/L — SIGNIFICANT CHANGE UP (ref 3.5–5.3)
POTASSIUM SERPL-SCNC: 3.9 MMOL/L — SIGNIFICANT CHANGE UP (ref 3.5–5.3)
POTASSIUM SERPL-SCNC: 4 MMOL/L — SIGNIFICANT CHANGE UP (ref 3.5–5.3)
PROTHROM AB SERPL-ACNC: 14.6 SEC — HIGH (ref 9.9–13.4)
RBC # BLD: 2.68 M/UL — LOW (ref 4.2–5.8)
RBC # BLD: 3.38 M/UL — LOW (ref 4.2–5.8)
RBC # FLD: 19.6 % — HIGH (ref 10.3–14.5)
RBC # FLD: 19.8 % — HIGH (ref 10.3–14.5)
RH IG SCN BLD-IMP: NEGATIVE — SIGNIFICANT CHANGE UP
SODIUM SERPL-SCNC: 140 MMOL/L — SIGNIFICANT CHANGE UP (ref 135–145)
SODIUM SERPL-SCNC: 141 MMOL/L — SIGNIFICANT CHANGE UP (ref 135–145)
WBC # BLD: 5.3 K/UL — SIGNIFICANT CHANGE UP (ref 3.8–10.5)
WBC # BLD: 8.32 K/UL — SIGNIFICANT CHANGE UP (ref 3.8–10.5)
WBC # FLD AUTO: 5.3 K/UL — SIGNIFICANT CHANGE UP (ref 3.8–10.5)
WBC # FLD AUTO: 8.32 K/UL — SIGNIFICANT CHANGE UP (ref 3.8–10.5)

## 2025-06-26 PROCEDURE — 52649 PROSTATE LASER ENUCLEATION: CPT

## 2025-06-26 PROCEDURE — 88305 TISSUE EXAM BY PATHOLOGIST: CPT | Mod: 26

## 2025-06-26 PROCEDURE — 86922 COMPATIBILITY TEST ANTIGLOB: CPT

## 2025-06-26 PROCEDURE — 86880 COOMBS TEST DIRECT: CPT

## 2025-06-26 PROCEDURE — 86900 BLOOD TYPING SEROLOGIC ABO: CPT

## 2025-06-26 PROCEDURE — 86870 RBC ANTIBODY IDENTIFICATION: CPT

## 2025-06-26 PROCEDURE — 85610 PROTHROMBIN TIME: CPT

## 2025-06-26 PROCEDURE — 86077 PHYS BLOOD BANK SERV XMATCH: CPT

## 2025-06-26 PROCEDURE — 86850 RBC ANTIBODY SCREEN: CPT

## 2025-06-26 PROCEDURE — 86901 BLOOD TYPING SEROLOGIC RH(D): CPT

## 2025-06-26 PROCEDURE — 80048 BASIC METABOLIC PNL TOTAL CA: CPT

## 2025-06-26 PROCEDURE — 85027 COMPLETE CBC AUTOMATED: CPT

## 2025-06-26 PROCEDURE — 85730 THROMBOPLASTIN TIME PARTIAL: CPT

## 2025-06-26 DEVICE — LASER FIBER SOLTIVE 550: Type: IMPLANTABLE DEVICE | Status: FUNCTIONAL

## 2025-06-26 DEVICE — MORCELLATOR ROTATION 4.8MM: Type: IMPLANTABLE DEVICE | Status: FUNCTIONAL

## 2025-06-26 DEVICE — GUIDEWIRE SENSOR DUAL-FLEX NITINOL STRAIGHT .035" X 150CM: Type: IMPLANTABLE DEVICE | Status: FUNCTIONAL

## 2025-06-26 RX ORDER — FINASTERIDE 1 MG/1
5 TABLET, FILM COATED ORAL DAILY
Refills: 0 | Status: DISCONTINUED | OUTPATIENT
Start: 2025-06-26 | End: 2025-07-02

## 2025-06-26 RX ORDER — EZETIMIBE 10 MG/1
10 TABLET ORAL DAILY
Refills: 0 | Status: DISCONTINUED | OUTPATIENT
Start: 2025-06-26 | End: 2025-07-02

## 2025-06-26 RX ORDER — FUROSEMIDE 10 MG/ML
10 INJECTION INTRAMUSCULAR; INTRAVENOUS ONCE
Refills: 0 | Status: DISCONTINUED | OUTPATIENT
Start: 2025-06-27 | End: 2025-06-27

## 2025-06-26 RX ORDER — SODIUM CHLORIDE 9 G/1000ML
1000 INJECTION, SOLUTION INTRAVENOUS
Refills: 0 | Status: DISCONTINUED | OUTPATIENT
Start: 2025-06-26 | End: 2025-06-28

## 2025-06-26 RX ORDER — CEFAZOLIN SODIUM IN 0.9 % NACL 3 G/100 ML
2000 INTRAVENOUS SOLUTION, PIGGYBACK (ML) INTRAVENOUS ONCE
Refills: 0 | Status: COMPLETED | OUTPATIENT
Start: 2025-06-26 | End: 2025-06-26

## 2025-06-26 RX ORDER — OMEPRAZOLE 20 MG/1
1 CAPSULE, DELAYED RELEASE ORAL
Refills: 0 | DISCHARGE

## 2025-06-26 RX ORDER — ACETAMINOPHEN 500 MG/5ML
975 LIQUID (ML) ORAL EVERY 6 HOURS
Refills: 0 | Status: DISCONTINUED | OUTPATIENT
Start: 2025-06-26 | End: 2025-06-27

## 2025-06-26 RX ORDER — FINASTERIDE 1 MG/1
0 TABLET, FILM COATED ORAL
Qty: 0 | Refills: 1 | DISCHARGE

## 2025-06-26 RX ORDER — TAMSULOSIN HYDROCHLORIDE 0.4 MG/1
0.4 CAPSULE ORAL AT BEDTIME
Refills: 0 | Status: DISCONTINUED | OUTPATIENT
Start: 2025-06-26 | End: 2025-07-02

## 2025-06-26 RX ORDER — FLUCONAZOLE 150 MG
400 TABLET ORAL EVERY 24 HOURS
Refills: 0 | Status: COMPLETED | OUTPATIENT
Start: 2025-06-26 | End: 2025-06-29

## 2025-06-26 RX ORDER — OXYBUTYNIN CHLORIDE 5 MG/1
1 TABLET, FILM COATED, EXTENDED RELEASE ORAL
Refills: 0 | DISCHARGE

## 2025-06-26 RX ORDER — APIXABAN 2.5 MG/1
1 TABLET, FILM COATED ORAL
Refills: 0 | DISCHARGE

## 2025-06-26 RX ORDER — HYDROMORPHONE/SOD CHLOR,ISO/PF 2 MG/10 ML
0.25 SYRINGE (ML) INJECTION
Refills: 0 | Status: DISCONTINUED | OUTPATIENT
Start: 2025-06-26 | End: 2025-06-27

## 2025-06-26 RX ORDER — DARBEPOETIN ALFA 40 UG/ML
SOLUTION INTRAVENOUS; SUBCUTANEOUS
Refills: 0 | DISCHARGE

## 2025-06-26 RX ORDER — APIXABAN 2.5 MG/1
0 TABLET, FILM COATED ORAL
Qty: 0 | Refills: 0 | DISCHARGE

## 2025-06-26 RX ORDER — FUROSEMIDE 10 MG/ML
1 INJECTION INTRAMUSCULAR; INTRAVENOUS
Refills: 0 | DISCHARGE

## 2025-06-26 RX ORDER — LIDOCAINE HCL/PF 20 MG/ML
0.2 AMPUL, LUER TIP INJECTION ONCE
Refills: 0 | Status: DISCONTINUED | OUTPATIENT
Start: 2025-06-26 | End: 2025-06-26

## 2025-06-26 RX ORDER — FUROSEMIDE 10 MG/ML
40 INJECTION INTRAMUSCULAR; INTRAVENOUS
Refills: 0 | Status: DISCONTINUED | OUTPATIENT
Start: 2025-06-26 | End: 2025-06-27

## 2025-06-26 RX ORDER — DORZOLAMIDE HYDROCHLORIDE AND TIMOLOL MALEATE 20; 5 MG/ML; MG/ML
1 SOLUTION/ DROPS OPHTHALMIC EVERY 12 HOURS
Refills: 0 | Status: DISCONTINUED | OUTPATIENT
Start: 2025-06-26 | End: 2025-07-02

## 2025-06-26 RX ORDER — DOXYCYCLINE HYCLATE 100 MG
50 TABLET ORAL DAILY
Refills: 0 | Status: DISCONTINUED | OUTPATIENT
Start: 2025-06-26 | End: 2025-06-28

## 2025-06-26 RX ORDER — SENNA 187 MG
2 TABLET ORAL AT BEDTIME
Refills: 0 | Status: DISCONTINUED | OUTPATIENT
Start: 2025-06-26 | End: 2025-07-02

## 2025-06-26 RX ORDER — EZETIMIBE 10 MG/1
0 TABLET ORAL
Qty: 0 | Refills: 0 | DISCHARGE

## 2025-06-26 RX ORDER — PIPERACILLIN-TAZO-DEXTROSE,ISO 3.375G/5
3.38 IV SOLUTION, PIGGYBACK PREMIX FROZEN(ML) INTRAVENOUS EVERY 8 HOURS
Refills: 0 | Status: COMPLETED | OUTPATIENT
Start: 2025-06-26 | End: 2025-06-29

## 2025-06-26 RX ORDER — TIMOLOL MALEATE 6.8 MG/ML
1 SOLUTION OPHTHALMIC EVERY 12 HOURS
Refills: 0 | Status: DISCONTINUED | OUTPATIENT
Start: 2025-06-26 | End: 2025-06-26

## 2025-06-26 RX ORDER — TIMOLOL MALEATE 6.8 MG/ML
0 SOLUTION OPHTHALMIC
Qty: 0 | Refills: 6 | DISCHARGE

## 2025-06-26 RX ORDER — TAMSULOSIN HYDROCHLORIDE 0.4 MG/1
0 CAPSULE ORAL
Qty: 0 | Refills: 0 | DISCHARGE

## 2025-06-26 RX ORDER — DORZOLAMIDE HYDROCHLORIDE AND TIMOLOL MALEATE 20; 5 MG/ML; MG/ML
0 SOLUTION/ DROPS OPHTHALMIC
Qty: 0 | Refills: 0 | DISCHARGE

## 2025-06-26 RX ORDER — FUROSEMIDE 10 MG/ML
10 INJECTION INTRAMUSCULAR; INTRAVENOUS ONCE
Refills: 0 | Status: COMPLETED | OUTPATIENT
Start: 2025-06-26 | End: 2025-06-26

## 2025-06-26 RX ORDER — SODIUM CHLORIDE 9 G/1000ML
1000 INJECTION, SOLUTION INTRAVENOUS
Refills: 0 | Status: DISCONTINUED | OUTPATIENT
Start: 2025-06-26 | End: 2025-06-27

## 2025-06-26 RX ORDER — AMLODIPINE BESYLATE 10 MG/1
2.5 TABLET ORAL DAILY
Refills: 0 | Status: DISCONTINUED | OUTPATIENT
Start: 2025-06-26 | End: 2025-06-27

## 2025-06-26 RX ORDER — SODIUM CHLORIDE 9 G/1000ML
500 INJECTION, SOLUTION INTRAVENOUS ONCE
Refills: 0 | Status: COMPLETED | OUTPATIENT
Start: 2025-06-26 | End: 2025-06-26

## 2025-06-26 RX ORDER — DOXYCYCLINE HYCLATE 100 MG
0 TABLET ORAL
Qty: 0 | Refills: 3 | DISCHARGE

## 2025-06-26 RX ORDER — FLUCONAZOLE 150 MG
0 TABLET ORAL
Refills: 0 | DISCHARGE

## 2025-06-26 RX ORDER — ASPIRIN 325 MG
81 TABLET ORAL DAILY
Refills: 0 | Status: DISCONTINUED | OUTPATIENT
Start: 2025-06-26 | End: 2025-06-27

## 2025-06-26 RX ORDER — ASPIRIN 325 MG
1 TABLET ORAL
Refills: 0 | DISCHARGE

## 2025-06-26 RX ORDER — PHENYLEPHRINE HCL IN 0.9% NACL 0.5 MG/5ML
0.5 SYRINGE (ML) INTRAVENOUS
Qty: 40 | Refills: 0 | Status: DISCONTINUED | OUTPATIENT
Start: 2025-06-26 | End: 2025-06-27

## 2025-06-26 RX ORDER — AMLODIPINE BESYLATE 10 MG/1
0 TABLET ORAL
Qty: 0 | Refills: 1 | DISCHARGE

## 2025-06-26 RX ORDER — TRAMADOL HYDROCHLORIDE 50 MG/1
1 TABLET, FILM COATED ORAL
Refills: 0 | DISCHARGE

## 2025-06-26 RX ORDER — FLUCONAZOLE 150 MG
1 TABLET ORAL
Refills: 0 | DISCHARGE

## 2025-06-26 RX ADMIN — Medication 975 MILLIGRAM(S): at 21:28

## 2025-06-26 RX ADMIN — SODIUM CHLORIDE 500 MILLILITER(S): 9 INJECTION, SOLUTION INTRAVENOUS at 22:15

## 2025-06-26 RX ADMIN — FUROSEMIDE 10 MILLIGRAM(S): 10 INJECTION INTRAMUSCULAR; INTRAVENOUS at 19:52

## 2025-06-26 RX ADMIN — TAMSULOSIN HYDROCHLORIDE 0.4 MILLIGRAM(S): 0.4 CAPSULE ORAL at 21:21

## 2025-06-26 RX ADMIN — SODIUM CHLORIDE 125 MILLILITER(S): 9 INJECTION, SOLUTION INTRAVENOUS at 19:52

## 2025-06-26 RX ADMIN — Medication 17.2 MICROGRAM(S)/KG/MIN: at 22:30

## 2025-06-26 RX ADMIN — Medication 975 MILLIGRAM(S): at 22:00

## 2025-06-26 RX ADMIN — Medication 25 GRAM(S): at 21:21

## 2025-06-26 NOTE — BRIEF OPERATIVE NOTE - NSICDXBRIEFPROCEDURE_GEN_ALL_CORE_FT
PROCEDURES:  Cystoscopy, with prostate enucleation using laser 26-Jun-2025 19:06:46  Nick Hernandez

## 2025-06-26 NOTE — PROGRESS NOTE ADULT - SUBJECTIVE AND OBJECTIVE BOX
Post op Check    Pt seen and examined- currently without complaints.   Was called by nursing staff because pt was c/o SOB and while attempting to clear phlegm from throat pts eyes rolled back and became unresponsive for ~15 seconds.   Pt also dropped SBP to the high 60s at that time- pressors were started  After coming t,  RN staff states pt was immediately AXO x3.  On my arrival, pt in no acute distress  Denies any chest pain or SOB.  Currently A&O x 3    Vital Signs Last 24 Hrs  T(C): 36 (26 Jun 2025 21:39), Max: 36.5 (26 Jun 2025 14:02)  T(F): 96.8 (26 Jun 2025 21:39), Max: 97.7 (26 Jun 2025 14:02)  HR: 56 (26 Jun 2025 22:45) (56 - 79)  BP: 110/54 (26 Jun 2025 22:45) (61/35 - 165/89)  BP(mean): 78 (26 Jun 2025 22:45) (43 - 88)  RR: 16 (26 Jun 2025 22:45) (14 - 18)  SpO2: 98% (26 Jun 2025 22:45) (93% - 99%)    Parameters below as of 26 Jun 2025 22:45  Patient On (Oxygen Delivery Method): nasal cannula  O2 Flow (L/min): 2      I&O's Summary    26 Jun 2025 07:01  -  26 Jun 2025 22:50  --------------------------------------------------------  IN: 735 mL / OUT: 0 mL / NET: 735 mL        Physical Exam  Gen: NAD  Chest: CTA b/l  Abd: Soft, NT, ND  : 3way weiss in place, +CBI, +traction. urine red                          9.6    5.30  )-----------( 151      ( 26 Jun 2025 19:59 )             31.7       06-26    141  |  109[H]  |  13  ----------------------------<  134[H]  3.9   |  19[L]  |  0.73    Ca    8.0[L]      26 Jun 2025 19:59        A/P: 84y Male s/p Laser enucleation of the prostate with ?vasovagal episode and hypotension requiring pressors    -STAT CBC, BMP, PT/PTT/INR  -attempt to wean pressors, may need SICU consultation if unable  -cont CBI  -DVT prophylaxis/OOB  -Incentive spirometry  -Strict I&O's  -Analgesia and antiemetics as needed  -Diet  -AM labs    Post op Check    Pt seen and examined- currently without complaints.   Was called by nursing staff because pt was c/o SOB and while attempting to clear phlegm from throat pts eyes rolled back and became unresponsive for ~15 seconds.   Pt also dropped SBP to the high 60s at that time- pressors were started  After coming t,  RN staff states pt was immediately AXO x3.  On my arrival, pt in no acute distress  Denies any chest pain or SOB.  Currently A&O x 3    Vital Signs Last 24 Hrs  T(C): 36 (26 Jun 2025 21:39), Max: 36.5 (26 Jun 2025 14:02)  T(F): 96.8 (26 Jun 2025 21:39), Max: 97.7 (26 Jun 2025 14:02)  HR: 56 (26 Jun 2025 22:45) (56 - 79)  BP: 110/54 (26 Jun 2025 22:45) (61/35 - 165/89)  BP(mean): 78 (26 Jun 2025 22:45) (43 - 88)  RR: 16 (26 Jun 2025 22:45) (14 - 18)  SpO2: 98% (26 Jun 2025 22:45) (93% - 99%)    Parameters below as of 26 Jun 2025 22:45  Patient On (Oxygen Delivery Method): nasal cannula  O2 Flow (L/min): 2      I&O's Summary    26 Jun 2025 07:01  -  26 Jun 2025 22:50  --------------------------------------------------------  IN: 735 mL / OUT: 0 mL / NET: 735 mL        Physical Exam  Gen: NAD  Chest: CTA b/l  Abd: Soft, NT, ND  : 3way weiss in place, +CBI, +traction. urine red                          9.6    5.30  )-----------( 151      ( 26 Jun 2025 19:59 )             31.7       06-26    141  |  109[H]  |  13  ----------------------------<  134[H]  3.9   |  19[L]  |  0.73    Ca    8.0[L]      26 Jun 2025 19:59        A/P: 84y Male s/p Laser enucleation of the prostate, left ureteral stent removal, with ?vasovagal episode and hypotension requiring pressors    -STAT CBC, BMP, PT/PTT/INR  -attempt to wean pressors, may need SICU consultation if unable  -cont CBI  -DVT prophylaxis/OOB  -Incentive spirometry  -Strict I&O's  -Analgesia and antiemetics as needed  -Diet  -AM labs

## 2025-06-26 NOTE — H&P PST ADULT - ASSESSMENT
BPH  OR today for cystoscopy left ureteral stent removal and laser enucleation of prostate with morcellation.  R/b/a

## 2025-06-26 NOTE — PATIENT PROFILE ADULT - FALL HARM RISK - HARM RISK INTERVENTIONS

## 2025-06-26 NOTE — H&P PST ADULT - HISTORY OF PRESENT ILLNESS
85 yo male with CAD s/p coronary stents x5 last 2019, paroxysmal afib s/p ablation 2017,2021, HTn.HLD, polymalgia rheumatica ,loop recorder implant, LY, COPD, anemia requiring several transfusions in May,  followed by lolly Andrew- possible mild MDS from bone marrow biopsy, with initial dose aranesp to be given this week), s/p mitraclip 6/2024 for mitral regurgiation. pt with recent hospitalization [facility] 4/30/25-5/13/25 for left kidney stone, Now s/p left ureteroscopy stent insertion on 6/19/25. Now presents for Left ureteral stent removal and laser enucleation of prostate with morcellation today.

## 2025-06-27 ENCOUNTER — TRANSCRIPTION ENCOUNTER (OUTPATIENT)
Age: 85
End: 2025-06-27

## 2025-06-27 ENCOUNTER — RESULT REVIEW (OUTPATIENT)
Age: 85
End: 2025-06-27

## 2025-06-27 LAB
ANION GAP SERPL CALC-SCNC: 12 MMOL/L — SIGNIFICANT CHANGE UP (ref 5–17)
ANION GAP SERPL CALC-SCNC: 13 MMOL/L — SIGNIFICANT CHANGE UP (ref 5–17)
APTT BLD: 30.4 SEC — SIGNIFICANT CHANGE UP (ref 26.1–36.8)
APTT BLD: 33.9 SEC — SIGNIFICANT CHANGE UP (ref 26.1–36.8)
BUN SERPL-MCNC: 14 MG/DL — SIGNIFICANT CHANGE UP (ref 7–23)
BUN SERPL-MCNC: 19 MG/DL — SIGNIFICANT CHANGE UP (ref 7–23)
CALCIUM SERPL-MCNC: 6.6 MG/DL — LOW (ref 8.4–10.5)
CALCIUM SERPL-MCNC: 8.3 MG/DL — LOW (ref 8.4–10.5)
CHLORIDE SERPL-SCNC: 109 MMOL/L — HIGH (ref 96–108)
CHLORIDE SERPL-SCNC: 113 MMOL/L — HIGH (ref 96–108)
CK MB CFR SERPL CALC: 2.3 NG/ML — SIGNIFICANT CHANGE UP (ref 0–6.7)
CK SERPL-CCNC: 20 U/L — LOW (ref 30–200)
CO2 SERPL-SCNC: 16 MMOL/L — LOW (ref 22–31)
CO2 SERPL-SCNC: 18 MMOL/L — LOW (ref 22–31)
CREAT SERPL-MCNC: 0.81 MG/DL — SIGNIFICANT CHANGE UP (ref 0.5–1.3)
CREAT SERPL-MCNC: 0.98 MG/DL — SIGNIFICANT CHANGE UP (ref 0.5–1.3)
EGFR: 76 ML/MIN/1.73M2 — SIGNIFICANT CHANGE UP
EGFR: 76 ML/MIN/1.73M2 — SIGNIFICANT CHANGE UP
EGFR: 87 ML/MIN/1.73M2 — SIGNIFICANT CHANGE UP
EGFR: 87 ML/MIN/1.73M2 — SIGNIFICANT CHANGE UP
GAS PNL BLDA: SIGNIFICANT CHANGE UP
GAS PNL BLDV: SIGNIFICANT CHANGE UP
GLUCOSE SERPL-MCNC: 155 MG/DL — HIGH (ref 70–99)
GLUCOSE SERPL-MCNC: 175 MG/DL — HIGH (ref 70–99)
HCT VFR BLD CALC: 22 % — LOW (ref 39–50)
HCT VFR BLD CALC: 22.7 % — LOW (ref 39–50)
HCT VFR BLD CALC: 23.1 % — LOW (ref 39–50)
HCT VFR BLD CALC: 24 % — LOW (ref 39–50)
HCT VFR BLD CALC: 24.7 % — LOW (ref 39–50)
HCT VFR BLD CALC: 24.7 % — LOW (ref 39–50)
HCT VFR BLD CALC: 24.8 % — LOW (ref 39–50)
HCT VFR BLD CALC: 25.5 % — LOW (ref 39–50)
HCT VFR BLD CALC: 25.7 % — LOW (ref 39–50)
HCT VFR BLD CALC: 27.1 % — LOW (ref 39–50)
HEPARINASE TEG R TIME: 4.9 MIN — SIGNIFICANT CHANGE UP (ref 4.3–8.3)
HGB BLD-MCNC: 6.8 G/DL — CRITICAL LOW (ref 13–17)
HGB BLD-MCNC: 7.3 G/DL — LOW (ref 13–17)
HGB BLD-MCNC: 7.6 G/DL — LOW (ref 13–17)
HGB BLD-MCNC: 7.7 G/DL — LOW (ref 13–17)
HGB BLD-MCNC: 7.9 G/DL — LOW (ref 13–17)
HGB BLD-MCNC: 8 G/DL — LOW (ref 13–17)
HGB BLD-MCNC: 8.1 G/DL — LOW (ref 13–17)
HGB BLD-MCNC: 8.1 G/DL — LOW (ref 13–17)
HGB BLD-MCNC: 8.3 G/DL — LOW (ref 13–17)
HGB BLD-MCNC: 8.6 G/DL — LOW (ref 13–17)
IMMATURE PLATELET FRACTION #: 2.3 K/UL — LOW (ref 3.9–12.5)
IMMATURE PLATELET FRACTION %: 2 % — SIGNIFICANT CHANGE UP (ref 1.6–7.1)
INR BLD: 1.34 RATIO — HIGH (ref 0.85–1.16)
INR BLD: 1.36 RATIO — HIGH (ref 0.85–1.16)
MAGNESIUM SERPL-MCNC: 1.6 MG/DL — SIGNIFICANT CHANGE UP (ref 1.6–2.6)
MAGNESIUM SERPL-MCNC: 2.3 MG/DL — SIGNIFICANT CHANGE UP (ref 1.6–2.6)
MCHC RBC-ENTMCNC: 28.1 PG — SIGNIFICANT CHANGE UP (ref 27–34)
MCHC RBC-ENTMCNC: 28.3 PG — SIGNIFICANT CHANGE UP (ref 27–34)
MCHC RBC-ENTMCNC: 28.4 PG — SIGNIFICANT CHANGE UP (ref 27–34)
MCHC RBC-ENTMCNC: 28.7 PG — SIGNIFICANT CHANGE UP (ref 27–34)
MCHC RBC-ENTMCNC: 28.9 PG — SIGNIFICANT CHANGE UP (ref 27–34)
MCHC RBC-ENTMCNC: 28.9 PG — SIGNIFICANT CHANGE UP (ref 27–34)
MCHC RBC-ENTMCNC: 29 PG — SIGNIFICANT CHANGE UP (ref 27–34)
MCHC RBC-ENTMCNC: 29 PG — SIGNIFICANT CHANGE UP (ref 27–34)
MCHC RBC-ENTMCNC: 29.2 PG — SIGNIFICANT CHANGE UP (ref 27–34)
MCHC RBC-ENTMCNC: 29.5 PG — SIGNIFICANT CHANGE UP (ref 27–34)
MCHC RBC-ENTMCNC: 30.9 G/DL — LOW (ref 32–36)
MCHC RBC-ENTMCNC: 31.1 G/DL — LOW (ref 32–36)
MCHC RBC-ENTMCNC: 31.7 G/DL — LOW (ref 32–36)
MCHC RBC-ENTMCNC: 31.7 G/DL — LOW (ref 32–36)
MCHC RBC-ENTMCNC: 32 G/DL — SIGNIFICANT CHANGE UP (ref 32–36)
MCHC RBC-ENTMCNC: 32.2 G/DL — SIGNIFICANT CHANGE UP (ref 32–36)
MCHC RBC-ENTMCNC: 32.5 G/DL — SIGNIFICANT CHANGE UP (ref 32–36)
MCHC RBC-ENTMCNC: 32.7 G/DL — SIGNIFICANT CHANGE UP (ref 32–36)
MCHC RBC-ENTMCNC: 32.8 G/DL — SIGNIFICANT CHANGE UP (ref 32–36)
MCHC RBC-ENTMCNC: 33.3 G/DL — SIGNIFICANT CHANGE UP (ref 32–36)
MCV RBC AUTO: 84.9 FL — SIGNIFICANT CHANGE UP (ref 80–100)
MCV RBC AUTO: 86.4 FL — SIGNIFICANT CHANGE UP (ref 80–100)
MCV RBC AUTO: 89.4 FL — SIGNIFICANT CHANGE UP (ref 80–100)
MCV RBC AUTO: 89.5 FL — SIGNIFICANT CHANGE UP (ref 80–100)
MCV RBC AUTO: 89.8 FL — SIGNIFICANT CHANGE UP (ref 80–100)
MCV RBC AUTO: 90.1 FL — SIGNIFICANT CHANGE UP (ref 80–100)
MCV RBC AUTO: 90.5 FL — SIGNIFICANT CHANGE UP (ref 80–100)
MCV RBC AUTO: 90.6 FL — SIGNIFICANT CHANGE UP (ref 80–100)
MCV RBC AUTO: 93.1 FL — SIGNIFICANT CHANGE UP (ref 80–100)
MCV RBC AUTO: 93.6 FL — SIGNIFICANT CHANGE UP (ref 80–100)
NRBC # BLD AUTO: 0 K/UL — SIGNIFICANT CHANGE UP (ref 0–0)
NRBC # FLD: 0 K/UL — SIGNIFICANT CHANGE UP (ref 0–0)
NRBC BLD AUTO-RTO: 0 /100 WBCS — SIGNIFICANT CHANGE UP (ref 0–0)
PHOSPHATE SERPL-MCNC: 3.2 MG/DL — SIGNIFICANT CHANGE UP (ref 2.5–4.5)
PHOSPHATE SERPL-MCNC: 3.9 MG/DL — SIGNIFICANT CHANGE UP (ref 2.5–4.5)
PLATELET # BLD AUTO: 108 K/UL — LOW (ref 150–400)
PLATELET # BLD AUTO: 116 K/UL — LOW (ref 150–400)
PLATELET # BLD AUTO: 182 K/UL — SIGNIFICANT CHANGE UP (ref 150–400)
PLATELET # BLD AUTO: 191 K/UL — SIGNIFICANT CHANGE UP (ref 150–400)
PLATELET # BLD AUTO: 196 K/UL — SIGNIFICANT CHANGE UP (ref 150–400)
PLATELET # BLD AUTO: 198 K/UL — SIGNIFICANT CHANGE UP (ref 150–400)
PLATELET # BLD AUTO: 202 K/UL — SIGNIFICANT CHANGE UP (ref 150–400)
PLATELET # BLD AUTO: 206 K/UL — SIGNIFICANT CHANGE UP (ref 150–400)
PLATELET # BLD AUTO: 215 K/UL — SIGNIFICANT CHANGE UP (ref 150–400)
PLATELET # BLD AUTO: 225 K/UL — SIGNIFICANT CHANGE UP (ref 150–400)
PMV BLD: 8.9 FL — SIGNIFICANT CHANGE UP (ref 7–13)
PMV BLD: 9.1 FL — SIGNIFICANT CHANGE UP (ref 7–13)
PMV BLD: 9.1 FL — SIGNIFICANT CHANGE UP (ref 7–13)
PMV BLD: 9.3 FL — SIGNIFICANT CHANGE UP (ref 7–13)
PMV BLD: 9.4 FL — SIGNIFICANT CHANGE UP (ref 7–13)
PMV BLD: 9.4 FL — SIGNIFICANT CHANGE UP (ref 7–13)
PMV BLD: 9.6 FL — SIGNIFICANT CHANGE UP (ref 7–13)
PMV BLD: 9.7 FL — SIGNIFICANT CHANGE UP (ref 7–13)
PMV BLD: 9.7 FL — SIGNIFICANT CHANGE UP (ref 7–13)
PMV BLD: 9.8 FL — SIGNIFICANT CHANGE UP (ref 7–13)
POTASSIUM SERPL-MCNC: 3.7 MMOL/L — SIGNIFICANT CHANGE UP (ref 3.5–5.3)
POTASSIUM SERPL-MCNC: 4.8 MMOL/L — SIGNIFICANT CHANGE UP (ref 3.5–5.3)
POTASSIUM SERPL-SCNC: 3.7 MMOL/L — SIGNIFICANT CHANGE UP (ref 3.5–5.3)
POTASSIUM SERPL-SCNC: 4.8 MMOL/L — SIGNIFICANT CHANGE UP (ref 3.5–5.3)
PROTHROM AB SERPL-ACNC: 15.4 SEC — HIGH (ref 9.9–13.4)
PROTHROM AB SERPL-ACNC: 15.5 SEC — HIGH (ref 9.9–13.4)
RAPIDTEG MAXIMUM AMPLITUDE: 52.3 MM — SIGNIFICANT CHANGE UP (ref 52–70)
RAPIDTEG MAXIMUM AMPLITUDE: 65.6 MM — SIGNIFICANT CHANGE UP (ref 52–70)
RBC # BLD: 2.35 M/UL — LOW (ref 4.2–5.8)
RBC # BLD: 2.52 M/UL — LOW (ref 4.2–5.8)
RBC # BLD: 2.65 M/UL — LOW (ref 4.2–5.8)
RBC # BLD: 2.72 M/UL — LOW (ref 4.2–5.8)
RBC # BLD: 2.73 M/UL — LOW (ref 4.2–5.8)
RBC # BLD: 2.75 M/UL — LOW (ref 4.2–5.8)
RBC # BLD: 2.76 M/UL — LOW (ref 4.2–5.8)
RBC # BLD: 2.77 M/UL — LOW (ref 4.2–5.8)
RBC # BLD: 2.95 M/UL — LOW (ref 4.2–5.8)
RBC # BLD: 3.03 M/UL — LOW (ref 4.2–5.8)
RBC # FLD: 15.8 % — HIGH (ref 10.3–14.5)
RBC # FLD: 16.6 % — HIGH (ref 10.3–14.5)
RBC # FLD: 16.9 % — HIGH (ref 10.3–14.5)
RBC # FLD: 17.2 % — HIGH (ref 10.3–14.5)
RBC # FLD: 17.2 % — HIGH (ref 10.3–14.5)
RBC # FLD: 17.5 % — HIGH (ref 10.3–14.5)
RBC # FLD: 17.6 % — HIGH (ref 10.3–14.5)
RBC # FLD: 17.9 % — HIGH (ref 10.3–14.5)
RBC # FLD: 19.4 % — HIGH (ref 10.3–14.5)
RBC # FLD: 19.6 % — HIGH (ref 10.3–14.5)
SODIUM SERPL-SCNC: 139 MMOL/L — SIGNIFICANT CHANGE UP (ref 135–145)
SODIUM SERPL-SCNC: 142 MMOL/L — SIGNIFICANT CHANGE UP (ref 135–145)
TEG FUNCTIONAL FIBRINOGEN: 11.6 MM — LOW (ref 15–32)
TEG FUNCTIONAL FIBRINOGEN: 20.9 MM — SIGNIFICANT CHANGE UP (ref 15–32)
TEG LY30 (LYSIS): 0 % — SIGNIFICANT CHANGE UP (ref 0–2.6)
TEG MAXIMUM AMPLITUDE: 53.5 MM — SIGNIFICANT CHANGE UP (ref 52–69)
TEG REACTION TIME: 5.2 MIN — SIGNIFICANT CHANGE UP (ref 4.6–9.1)
TEG REACTION TIME: 5.5 MIN — SIGNIFICANT CHANGE UP (ref 4.6–9.1)
TROPONIN T, HIGH SENSITIVITY RESULT: 25 NG/L — SIGNIFICANT CHANGE UP (ref 0–51)
WBC # BLD: 10.38 K/UL — SIGNIFICANT CHANGE UP (ref 3.8–10.5)
WBC # BLD: 11.4 K/UL — HIGH (ref 3.8–10.5)
WBC # BLD: 11.59 K/UL — HIGH (ref 3.8–10.5)
WBC # BLD: 11.8 K/UL — HIGH (ref 3.8–10.5)
WBC # BLD: 12.4 K/UL — HIGH (ref 3.8–10.5)
WBC # BLD: 13.07 K/UL — HIGH (ref 3.8–10.5)
WBC # BLD: 13.45 K/UL — HIGH (ref 3.8–10.5)
WBC # BLD: 14.82 K/UL — HIGH (ref 3.8–10.5)
WBC # BLD: 9.28 K/UL — SIGNIFICANT CHANGE UP (ref 3.8–10.5)
WBC # BLD: 9.54 K/UL — SIGNIFICANT CHANGE UP (ref 3.8–10.5)
WBC # FLD AUTO: 10.38 K/UL — SIGNIFICANT CHANGE UP (ref 3.8–10.5)
WBC # FLD AUTO: 11.4 K/UL — HIGH (ref 3.8–10.5)
WBC # FLD AUTO: 11.59 K/UL — HIGH (ref 3.8–10.5)
WBC # FLD AUTO: 11.8 K/UL — HIGH (ref 3.8–10.5)
WBC # FLD AUTO: 12.4 K/UL — HIGH (ref 3.8–10.5)
WBC # FLD AUTO: 13.07 K/UL — HIGH (ref 3.8–10.5)
WBC # FLD AUTO: 13.45 K/UL — HIGH (ref 3.8–10.5)
WBC # FLD AUTO: 14.82 K/UL — HIGH (ref 3.8–10.5)
WBC # FLD AUTO: 9.28 K/UL — SIGNIFICANT CHANGE UP (ref 3.8–10.5)
WBC # FLD AUTO: 9.54 K/UL — SIGNIFICANT CHANGE UP (ref 3.8–10.5)

## 2025-06-27 PROCEDURE — 82803 BLOOD GASES ANY COMBINATION: CPT

## 2025-06-27 PROCEDURE — 99291 CRITICAL CARE FIRST HOUR: CPT

## 2025-06-27 PROCEDURE — 84295 ASSAY OF SERUM SODIUM: CPT

## 2025-06-27 PROCEDURE — 93306 TTE W/DOPPLER COMPLETE: CPT | Mod: 26

## 2025-06-27 PROCEDURE — 82330 ASSAY OF CALCIUM: CPT

## 2025-06-27 PROCEDURE — 85018 HEMOGLOBIN: CPT

## 2025-06-27 PROCEDURE — 85610 PROTHROMBIN TIME: CPT

## 2025-06-27 PROCEDURE — 52214 CYSTOSCOPY AND TREATMENT: CPT | Mod: 59,78

## 2025-06-27 PROCEDURE — 82550 ASSAY OF CK (CPK): CPT

## 2025-06-27 PROCEDURE — C9399: CPT

## 2025-06-27 PROCEDURE — 85396 CLOTTING ASSAY WHOLE BLOOD: CPT

## 2025-06-27 PROCEDURE — 82435 ASSAY OF BLOOD CHLORIDE: CPT

## 2025-06-27 PROCEDURE — 86900 BLOOD TYPING SEROLOGIC ABO: CPT

## 2025-06-27 PROCEDURE — 82947 ASSAY GLUCOSE BLOOD QUANT: CPT

## 2025-06-27 PROCEDURE — 84100 ASSAY OF PHOSPHORUS: CPT

## 2025-06-27 PROCEDURE — 85014 HEMATOCRIT: CPT

## 2025-06-27 PROCEDURE — 84484 ASSAY OF TROPONIN QUANT: CPT

## 2025-06-27 PROCEDURE — 83605 ASSAY OF LACTIC ACID: CPT

## 2025-06-27 PROCEDURE — 82553 CREATINE MB FRACTION: CPT

## 2025-06-27 PROCEDURE — 71045 X-RAY EXAM CHEST 1 VIEW: CPT

## 2025-06-27 PROCEDURE — 85027 COMPLETE CBC AUTOMATED: CPT

## 2025-06-27 PROCEDURE — 36010 PLACE CATHETER IN VEIN: CPT | Mod: RT

## 2025-06-27 PROCEDURE — 52001 CYSTO W/IRRG&EVAC MLT CLOTS: CPT | Mod: 78

## 2025-06-27 PROCEDURE — 86870 RBC ANTIBODY IDENTIFICATION: CPT

## 2025-06-27 PROCEDURE — C1782: CPT

## 2025-06-27 PROCEDURE — 86880 COOMBS TEST DIRECT: CPT

## 2025-06-27 PROCEDURE — C1889: CPT

## 2025-06-27 PROCEDURE — 80048 BASIC METABOLIC PNL TOTAL CA: CPT

## 2025-06-27 PROCEDURE — 83735 ASSAY OF MAGNESIUM: CPT

## 2025-06-27 PROCEDURE — 86922 COMPATIBILITY TEST ANTIGLOB: CPT

## 2025-06-27 PROCEDURE — 71045 X-RAY EXAM CHEST 1 VIEW: CPT | Mod: 26

## 2025-06-27 PROCEDURE — 85730 THROMBOPLASTIN TIME PARTIAL: CPT

## 2025-06-27 PROCEDURE — 76937 US GUIDE VASCULAR ACCESS: CPT | Mod: 26

## 2025-06-27 PROCEDURE — 86850 RBC ANTIBODY SCREEN: CPT

## 2025-06-27 PROCEDURE — 86901 BLOOD TYPING SEROLOGIC RH(D): CPT

## 2025-06-27 PROCEDURE — 84132 ASSAY OF SERUM POTASSIUM: CPT

## 2025-06-27 PROCEDURE — C1769: CPT

## 2025-06-27 RX ORDER — SENNA 187 MG
1 TABLET ORAL DAILY
Refills: 0 | Status: DISCONTINUED | OUTPATIENT
Start: 2025-06-27 | End: 2025-07-02

## 2025-06-27 RX ORDER — SODIUM CHLORIDE 9 G/1000ML
500 INJECTION, SOLUTION INTRAVENOUS ONCE
Refills: 0 | Status: COMPLETED | OUTPATIENT
Start: 2025-06-27 | End: 2025-06-27

## 2025-06-27 RX ORDER — OXYBUTYNIN CHLORIDE 5 MG/1
10 TABLET, FILM COATED, EXTENDED RELEASE ORAL ONCE
Refills: 0 | Status: COMPLETED | OUTPATIENT
Start: 2025-06-27 | End: 2025-06-27

## 2025-06-27 RX ORDER — NOREPINEPHRINE BITARTRATE 8 MG
0.07 SOLUTION INTRAVENOUS
Qty: 8 | Refills: 0 | Status: DISCONTINUED | OUTPATIENT
Start: 2025-06-27 | End: 2025-06-28

## 2025-06-27 RX ORDER — CALCIUM GLUCONATE 20 MG/ML
2 INJECTION, SOLUTION INTRAVENOUS ONCE
Refills: 0 | Status: COMPLETED | OUTPATIENT
Start: 2025-06-27 | End: 2025-06-27

## 2025-06-27 RX ORDER — OXYBUTYNIN CHLORIDE 5 MG/1
5 TABLET, FILM COATED, EXTENDED RELEASE ORAL
Refills: 0 | Status: DISCONTINUED | OUTPATIENT
Start: 2025-06-27 | End: 2025-06-28

## 2025-06-27 RX ORDER — PHENYLEPHRINE HCL IN 0.9% NACL 0.5 MG/5ML
0.5 SYRINGE (ML) INTRAVENOUS
Qty: 40 | Refills: 0 | Status: DISCONTINUED | OUTPATIENT
Start: 2025-06-27 | End: 2025-06-27

## 2025-06-27 RX ORDER — PHENYLEPHRINE HCL IN 0.9% NACL 0.5 MG/5ML
3 SYRINGE (ML) INTRAVENOUS
Qty: 40 | Refills: 0 | Status: DISCONTINUED | OUTPATIENT
Start: 2025-06-27 | End: 2025-06-27

## 2025-06-27 RX ORDER — ACETAMINOPHEN 500 MG/5ML
1000 LIQUID (ML) ORAL EVERY 6 HOURS
Refills: 0 | Status: COMPLETED | OUTPATIENT
Start: 2025-06-27 | End: 2025-06-28

## 2025-06-27 RX ORDER — POLYETHYLENE GLYCOL 3350 17 G/17G
17 POWDER, FOR SOLUTION ORAL DAILY
Refills: 0 | Status: DISCONTINUED | OUTPATIENT
Start: 2025-06-27 | End: 2025-07-02

## 2025-06-27 RX ORDER — MAGNESIUM SULFATE 500 MG/ML
2 SYRINGE (ML) INJECTION
Refills: 0 | Status: COMPLETED | OUTPATIENT
Start: 2025-06-27 | End: 2025-06-27

## 2025-06-27 RX ADMIN — DORZOLAMIDE HYDROCHLORIDE AND TIMOLOL MALEATE 1 DROP(S): 20; 5 SOLUTION/ DROPS OPHTHALMIC at 06:32

## 2025-06-27 RX ADMIN — Medication 1000 MILLIGRAM(S): at 19:46

## 2025-06-27 RX ADMIN — Medication 400 MILLIGRAM(S): at 14:50

## 2025-06-27 RX ADMIN — Medication 1000 MILLIGRAM(S): at 15:00

## 2025-06-27 RX ADMIN — OXYBUTYNIN CHLORIDE 5 MILLIGRAM(S): 5 TABLET, FILM COATED, EXTENDED RELEASE ORAL at 17:51

## 2025-06-27 RX ADMIN — SODIUM CHLORIDE 2000 MILLILITER(S): 9 INJECTION, SOLUTION INTRAVENOUS at 05:15

## 2025-06-27 RX ADMIN — Medication 25 GRAM(S): at 06:35

## 2025-06-27 RX ADMIN — SODIUM CHLORIDE 125 MILLILITER(S): 9 INJECTION, SOLUTION INTRAVENOUS at 19:30

## 2025-06-27 RX ADMIN — Medication 40 MILLIGRAM(S): at 06:51

## 2025-06-27 RX ADMIN — Medication 400 MILLIGRAM(S): at 19:31

## 2025-06-27 RX ADMIN — Medication 25 GRAM(S): at 21:12

## 2025-06-27 RX ADMIN — Medication 25 GRAM(S): at 06:32

## 2025-06-27 RX ADMIN — OXYBUTYNIN CHLORIDE 10 MILLIGRAM(S): 5 TABLET, FILM COATED, EXTENDED RELEASE ORAL at 15:32

## 2025-06-27 RX ADMIN — CALCIUM GLUCONATE 200 GRAM(S): 20 INJECTION, SOLUTION INTRAVENOUS at 07:06

## 2025-06-27 RX ADMIN — SODIUM CHLORIDE 75 MILLILITER(S): 9 INJECTION, SOLUTION INTRAVENOUS at 21:12

## 2025-06-27 RX ADMIN — Medication 25 GRAM(S): at 04:26

## 2025-06-27 RX ADMIN — TAMSULOSIN HYDROCHLORIDE 0.4 MILLIGRAM(S): 0.4 CAPSULE ORAL at 21:12

## 2025-06-27 RX ADMIN — Medication 100 MILLIGRAM(S): at 14:50

## 2025-06-27 RX ADMIN — Medication 20 MILLIEQUIVALENT(S): at 05:17

## 2025-06-27 RX ADMIN — Medication 25 GRAM(S): at 13:57

## 2025-06-27 RX ADMIN — NOREPINEPHRINE BITARTRATE 12 MICROGRAM(S)/KG/MIN: 8 SOLUTION at 19:30

## 2025-06-27 RX ADMIN — DORZOLAMIDE HYDROCHLORIDE AND TIMOLOL MALEATE 1 DROP(S): 20; 5 SOLUTION/ DROPS OPHTHALMIC at 18:06

## 2025-06-27 RX ADMIN — Medication 1 APPLICATION(S): at 06:36

## 2025-06-27 NOTE — CHART NOTE - NSCHARTNOTEFT_GEN_A_CORE
84y Male s/p Laser enucleation of the prostate, left ureteral stent removal, on CBI, with ?vasovagal episode and hypotension requiring pressors    Received call from SICU Pt's weiss stopped draining, POCUS notable for clots. CBI stopped.   Pt assessed at bedside, AOx3.   H/H not responding to pRBC x1, second unit pRBC given in SICU along with plasma.     Pt's weiss irrigated with return of some clots, weiss started draining, CBI restarted. Weiss balloon (100cc) placed on traction.     Third unit of pRBC to be given in SICU    PLAN  -C/w CBI  -Maintain Weiss balloon on traction.   -Rest of care per SICU.

## 2025-06-27 NOTE — PROVIDER CONTACT NOTE (CHANGE IN STATUS NOTIFICATION) - BACKGROUND
Pt s/p cystoscopy, with prostate enucleation using laser. Now has 22Fr 3 way on CBI. In PACU, became had H/H drop and became hypotensive. Given 1u PRBCs and started on Kota. Brought to SICU for hemodynamic monitoring.
s/p cysto Prostate exulceration with Laser

## 2025-06-27 NOTE — PROVIDER CONTACT NOTE (CHANGE IN STATUS NOTIFICATION) - RECOMMENDATIONS
LUNA Underwood notified and requested at bedside. Recommended to give fluids or blood products, place arterial line to monitor BP in realtime, send labs

## 2025-06-27 NOTE — PRE-ANESTHESIA EVALUATION ADULT - NSANTHPMHFT_GEN_ALL_CORE
Patient is an 85yo man with CAD s/p coronary stents x5 last 2019, paroxysmal afib s/p ablation 2017,2021, HTN. HLD, polymalgia rheumatica, loop recorder implant, LY, COPD, anemia requiring several transfusions in May, with possible mild MDS per bone marrow biopsy, s/p mitraclip 6/2024 for mitral regurgitation. Patient with recent hospitalization [facility] 4/30/25-5/13/25 for left kidney stone, Now s/p left ureteroscopy stent insertion on 6/19/25. Received Left ureteral stent removal and laser enucleation of prostate with morcellation on 6/26. Patient then began to experience persistent post-operative hypotension with a potential vasovagal episode and was admitted to SICU for ongoing pressor requirements.    Patient received CBI and required transfusion of 3 units of PRBCs and 1 unit FFP. Overnight the patient's weiss stopped draining with POCUS notable for clots. Urology irrigated weiss with return of clots. Patient now returning to OR for further clot removal.

## 2025-06-27 NOTE — PROVIDER CONTACT NOTE (CHANGE IN STATUS NOTIFICATION) - ACTION/TREATMENT ORDERED:
repeat CBC lans
LUNA Underwood at bedside. 1L LR being administered. 1u PRBC's ordered. Full set of labs to be sent. Arterial line to be placed. Emergency titration of Kota to 5.5mcg/kg/min

## 2025-06-27 NOTE — PROVIDER CONTACT NOTE (CHANGE IN STATUS NOTIFICATION) - ASSESSMENT
CBI red bloody urine color  on [place] GTT
T 97.8F, HR 85, BP 66/32 (42), RR 17, SpO2 93% on 2L NC. Patient states that they feel like they are going to pass out. Patient is cool and clammy. Arrived to SICU on 2.5mcg/kg/min of Kota.

## 2025-06-27 NOTE — PROGRESS NOTE ADULT - SUBJECTIVE AND OBJECTIVE BOX
UROLOGY DAILY PROGRESS NOTE:     Subjective: Patient seen and examined at bedside. Transferred to SICU overnight, on pressors. Received 3U PRBC and 1U FFP post op.       Objective:  Vital signs  T(F): , Max: 98.1 (06-27-25 @ 02:00)  HR: 67 (06-27-25 @ 07:00)  BP: 104/57 (06-27-25 @ 02:45)  SpO2: 98% (06-27-25 @ 07:00)  Wt(kg): --    I&O's Summary    26 Jun 2025 07:01  -  27 Jun 2025 07:00  --------------------------------------------------------  IN: 73492.6 mL / OUT: 05172 mL / NET: 1977.6 mL        Gen: NAD  Pulm: No respiratory distress, no subcostal retractions  CV: RRR, no JVD  Abd: Soft, NT, ND  : CBI on, traction on, urine bright red     Labs:  06-27  11.59 / 24.7  /x      06-27  11.40 / 27.1  /x                              8.1    11.59 )-----------( 182      ( 27 Jun 2025 06:14 )             24.7     06-27    142  |  113[H]  |  14  ----------------------------<  175[H]  3.7   |  16[L]  |  0.81    Ca    6.6[L]      27 Jun 2025 03:05  Phos  3.2     06-27  Mg     1.6     06-27      PT/INR - ( 27 Jun 2025 03:05 )   PT: 15.5 sec;   INR: 1.36 ratio         PTT - ( 27 Jun 2025 03:05 )  PTT:30.4 sec    Urine Cx:

## 2025-06-27 NOTE — PROVIDER CONTACT NOTE (CHANGE IN STATUS NOTIFICATION) - NAME OF MD/NP/PA/DO NOTIFIED:
uology team Bryan CARRASCO
BP 66/32 (42), patient complaining of feeling like they are going to pass out

## 2025-06-27 NOTE — PRE-ANESTHESIA EVALUATION ADULT - NSANTHPROCED_GEN_ALL_CORE
Pt resting and comffortable. Wife at bedside.  Pt vebralizing he feels better Arterial Catheter/Central Venous Catheter

## 2025-06-27 NOTE — PRE-ANESTHESIA EVALUATION ADULT - NS MD HP INPLANTS MED DEV
loop recorder, urinary catheter/Artificial joint/Vascular stents/Clips/Investigational device
loop recorder, urinary catheter/Artificial joint/Vascular stents/Clips/Investigational device

## 2025-06-27 NOTE — CONSULT NOTE ADULT - NS ATTEND AMEND GEN_ALL_CORE FT
84 yr Hx of HTN CAD w multiple stents, MDS w prostate enucleation complication with bleeding and syncope in PACU  receveid 5rbc and 1 ffp, planned to return to OR     aaox 4, pain of weiss cath  tylenol for pain  on RA, hx of COPD  CXR cardriomegaly, clear lungs, CVC in position  hypotensive on juana 3.8 , goal MAP 65 for now  hemorrhagic shock, will use FFP re expand intravascular volume   TTE read pending  lact 4.3 stable  trop not elevated   LR @ 125/hr   NPO for OR  protonix  CBI, urine output inaccurate, appears serosang  Cr 0.8  urology plan for hemostatic control in OR  hold off prostate meds  anemic at baseline, hb 7, plt nl  chem VTE PPX on hold  TEG nl  normothermic, zosyn empiric   f/u cx  good glycemic control  PT OT when able      rt IJ CVC , rad a line

## 2025-06-27 NOTE — PROGRESS NOTE ADULT - ASSESSMENT
A/P: 84y Male s/p Laser enucleation of the prostate, left ureteral stent removal, with post op bleeding, hypotensin now in SICU    - trend H/H  - wean pressors  -cont CBI and traction  -DVT prophylaxis/OOB  -Incentive spirometry  -Antispasmodics- ditropan  -NPO  - possible RTOR if bleeding persists

## 2025-06-27 NOTE — PRE-ANESTHESIA EVALUATION ADULT - NSANTHPEFT_GEN_ALL_CORE
Gen: NAD  Heart: RRR, no MRG  Lungs: CTAB, unlabored breathing without use of accessory muscles on RA

## 2025-06-28 LAB
ANION GAP SERPL CALC-SCNC: 7 MMOL/L — SIGNIFICANT CHANGE UP (ref 5–17)
ANION GAP SERPL CALC-SCNC: 8 MMOL/L — SIGNIFICANT CHANGE UP (ref 5–17)
APTT BLD: 31.3 SEC — SIGNIFICANT CHANGE UP (ref 26.1–36.8)
BUN SERPL-MCNC: 14 MG/DL — SIGNIFICANT CHANGE UP (ref 7–23)
BUN SERPL-MCNC: 21 MG/DL — SIGNIFICANT CHANGE UP (ref 7–23)
CALCIUM SERPL-MCNC: 7.9 MG/DL — LOW (ref 8.4–10.5)
CALCIUM SERPL-MCNC: 7.9 MG/DL — LOW (ref 8.4–10.5)
CHLORIDE SERPL-SCNC: 107 MMOL/L — SIGNIFICANT CHANGE UP (ref 96–108)
CHLORIDE SERPL-SCNC: 108 MMOL/L — SIGNIFICANT CHANGE UP (ref 96–108)
CO2 SERPL-SCNC: 20 MMOL/L — LOW (ref 22–31)
CO2 SERPL-SCNC: 21 MMOL/L — LOW (ref 22–31)
CREAT SERPL-MCNC: 0.93 MG/DL — SIGNIFICANT CHANGE UP (ref 0.5–1.3)
CREAT SERPL-MCNC: 1.08 MG/DL — SIGNIFICANT CHANGE UP (ref 0.5–1.3)
EGFR: 68 ML/MIN/1.73M2 — SIGNIFICANT CHANGE UP
EGFR: 68 ML/MIN/1.73M2 — SIGNIFICANT CHANGE UP
EGFR: 81 ML/MIN/1.73M2 — SIGNIFICANT CHANGE UP
EGFR: 81 ML/MIN/1.73M2 — SIGNIFICANT CHANGE UP
GAS PNL BLDA: SIGNIFICANT CHANGE UP
GAS PNL BLDA: SIGNIFICANT CHANGE UP
GLUCOSE SERPL-MCNC: 128 MG/DL — HIGH (ref 70–99)
GLUCOSE SERPL-MCNC: 94 MG/DL — SIGNIFICANT CHANGE UP (ref 70–99)
HCT VFR BLD CALC: 21.5 % — LOW (ref 39–50)
HCT VFR BLD CALC: 21.9 % — LOW (ref 39–50)
HCT VFR BLD CALC: 22.5 % — LOW (ref 39–50)
HCT VFR BLD CALC: 22.8 % — LOW (ref 39–50)
HGB BLD-MCNC: 7.2 G/DL — LOW (ref 13–17)
HGB BLD-MCNC: 7.4 G/DL — LOW (ref 13–17)
HGB BLD-MCNC: 7.5 G/DL — LOW (ref 13–17)
HGB BLD-MCNC: 7.6 G/DL — LOW (ref 13–17)
IMMATURE PLATELET FRACTION #: 1.6 K/UL — LOW (ref 3.9–12.5)
IMMATURE PLATELET FRACTION %: 1.5 % — LOW (ref 1.6–7.1)
INR BLD: 1.2 RATIO — HIGH (ref 0.85–1.16)
MAGNESIUM SERPL-MCNC: 2 MG/DL — SIGNIFICANT CHANGE UP (ref 1.6–2.6)
MAGNESIUM SERPL-MCNC: 2.2 MG/DL — SIGNIFICANT CHANGE UP (ref 1.6–2.6)
MCHC RBC-ENTMCNC: 28.5 PG — SIGNIFICANT CHANGE UP (ref 27–34)
MCHC RBC-ENTMCNC: 28.6 PG — SIGNIFICANT CHANGE UP (ref 27–34)
MCHC RBC-ENTMCNC: 28.7 PG — SIGNIFICANT CHANGE UP (ref 27–34)
MCHC RBC-ENTMCNC: 29.4 PG — SIGNIFICANT CHANGE UP (ref 27–34)
MCHC RBC-ENTMCNC: 33.3 G/DL — SIGNIFICANT CHANGE UP (ref 32–36)
MCHC RBC-ENTMCNC: 33.3 G/DL — SIGNIFICANT CHANGE UP (ref 32–36)
MCHC RBC-ENTMCNC: 33.5 G/DL — SIGNIFICANT CHANGE UP (ref 32–36)
MCHC RBC-ENTMCNC: 33.8 G/DL — SIGNIFICANT CHANGE UP (ref 32–36)
MCV RBC AUTO: 85 FL — SIGNIFICANT CHANGE UP (ref 80–100)
MCV RBC AUTO: 85.9 FL — SIGNIFICANT CHANGE UP (ref 80–100)
MCV RBC AUTO: 86 FL — SIGNIFICANT CHANGE UP (ref 80–100)
MCV RBC AUTO: 86.9 FL — SIGNIFICANT CHANGE UP (ref 80–100)
NRBC # BLD AUTO: 0 K/UL — SIGNIFICANT CHANGE UP (ref 0–0)
NRBC # FLD: 0 K/UL — SIGNIFICANT CHANGE UP (ref 0–0)
NRBC BLD AUTO-RTO: 0 /100 WBCS — SIGNIFICANT CHANGE UP (ref 0–0)
PHOSPHATE SERPL-MCNC: 1.6 MG/DL — LOW (ref 2.5–4.5)
PHOSPHATE SERPL-MCNC: 2.5 MG/DL — SIGNIFICANT CHANGE UP (ref 2.5–4.5)
PLATELET # BLD AUTO: 101 K/UL — LOW (ref 150–400)
PLATELET # BLD AUTO: 102 K/UL — LOW (ref 150–400)
PLATELET # BLD AUTO: 106 K/UL — LOW (ref 150–400)
PLATELET # BLD AUTO: 108 K/UL — LOW (ref 150–400)
PMV BLD: 9.2 FL — SIGNIFICANT CHANGE UP (ref 7–13)
PMV BLD: 9.4 FL — SIGNIFICANT CHANGE UP (ref 7–13)
PMV BLD: 9.5 FL — SIGNIFICANT CHANGE UP (ref 7–13)
PMV BLD: 9.5 FL — SIGNIFICANT CHANGE UP (ref 7–13)
POTASSIUM SERPL-MCNC: 3.9 MMOL/L — SIGNIFICANT CHANGE UP (ref 3.5–5.3)
POTASSIUM SERPL-MCNC: 4.1 MMOL/L — SIGNIFICANT CHANGE UP (ref 3.5–5.3)
POTASSIUM SERPL-SCNC: 3.9 MMOL/L — SIGNIFICANT CHANGE UP (ref 3.5–5.3)
POTASSIUM SERPL-SCNC: 4.1 MMOL/L — SIGNIFICANT CHANGE UP (ref 3.5–5.3)
PROTHROM AB SERPL-ACNC: 13.8 SEC — HIGH (ref 9.9–13.4)
RBC # BLD: 2.52 M/UL — LOW (ref 4.2–5.8)
RBC # BLD: 2.53 M/UL — LOW (ref 4.2–5.8)
RBC # BLD: 2.62 M/UL — LOW (ref 4.2–5.8)
RBC # BLD: 2.65 M/UL — LOW (ref 4.2–5.8)
RBC # FLD: 17.2 % — HIGH (ref 10.3–14.5)
RBC # FLD: 17.2 % — HIGH (ref 10.3–14.5)
RBC # FLD: 17.3 % — HIGH (ref 10.3–14.5)
RBC # FLD: 17.3 % — HIGH (ref 10.3–14.5)
SODIUM SERPL-SCNC: 135 MMOL/L — SIGNIFICANT CHANGE UP (ref 135–145)
SODIUM SERPL-SCNC: 136 MMOL/L — SIGNIFICANT CHANGE UP (ref 135–145)
WBC # BLD: 10.24 K/UL — SIGNIFICANT CHANGE UP (ref 3.8–10.5)
WBC # BLD: 10.32 K/UL — SIGNIFICANT CHANGE UP (ref 3.8–10.5)
WBC # BLD: 8.36 K/UL — SIGNIFICANT CHANGE UP (ref 3.8–10.5)
WBC # BLD: 8.69 K/UL — SIGNIFICANT CHANGE UP (ref 3.8–10.5)
WBC # FLD AUTO: 10.24 K/UL — SIGNIFICANT CHANGE UP (ref 3.8–10.5)
WBC # FLD AUTO: 10.32 K/UL — SIGNIFICANT CHANGE UP (ref 3.8–10.5)
WBC # FLD AUTO: 8.36 K/UL — SIGNIFICANT CHANGE UP (ref 3.8–10.5)
WBC # FLD AUTO: 8.69 K/UL — SIGNIFICANT CHANGE UP (ref 3.8–10.5)

## 2025-06-28 PROCEDURE — 36620 INSERTION CATHETER ARTERY: CPT

## 2025-06-28 PROCEDURE — 76937 US GUIDE VASCULAR ACCESS: CPT | Mod: 26

## 2025-06-28 PROCEDURE — 99291 CRITICAL CARE FIRST HOUR: CPT

## 2025-06-28 PROCEDURE — 71045 X-RAY EXAM CHEST 1 VIEW: CPT | Mod: 26

## 2025-06-28 RX ORDER — ENOXAPARIN SODIUM 100 MG/ML
40 INJECTION SUBCUTANEOUS EVERY 24 HOURS
Refills: 0 | Status: DISCONTINUED | OUTPATIENT
Start: 2025-06-28 | End: 2025-07-02

## 2025-06-28 RX ORDER — OXYBUTYNIN CHLORIDE 5 MG/1
5 TABLET, FILM COATED, EXTENDED RELEASE ORAL THREE TIMES A DAY
Refills: 0 | Status: DISCONTINUED | OUTPATIENT
Start: 2025-06-28 | End: 2025-07-02

## 2025-06-28 RX ADMIN — Medication 100 MILLIGRAM(S): at 14:03

## 2025-06-28 RX ADMIN — NOREPINEPHRINE BITARTRATE 12 MICROGRAM(S)/KG/MIN: 8 SOLUTION at 07:42

## 2025-06-28 RX ADMIN — Medication 1 APPLICATION(S): at 05:14

## 2025-06-28 RX ADMIN — Medication 25 GRAM(S): at 05:14

## 2025-06-28 RX ADMIN — Medication 25 GRAM(S): at 21:42

## 2025-06-28 RX ADMIN — SODIUM CHLORIDE 75 MILLILITER(S): 9 INJECTION, SOLUTION INTRAVENOUS at 07:42

## 2025-06-28 RX ADMIN — Medication 40 MILLIGRAM(S): at 05:14

## 2025-06-28 RX ADMIN — OXYBUTYNIN CHLORIDE 5 MILLIGRAM(S): 5 TABLET, FILM COATED, EXTENDED RELEASE ORAL at 03:33

## 2025-06-28 RX ADMIN — Medication 5 MILLILITER(S): at 19:14

## 2025-06-28 RX ADMIN — OXYBUTYNIN CHLORIDE 5 MILLIGRAM(S): 5 TABLET, FILM COATED, EXTENDED RELEASE ORAL at 12:01

## 2025-06-28 RX ADMIN — Medication 1000 MILLIGRAM(S): at 01:22

## 2025-06-28 RX ADMIN — Medication 400 MILLIGRAM(S): at 01:07

## 2025-06-28 RX ADMIN — ENOXAPARIN SODIUM 40 MILLIGRAM(S): 100 INJECTION SUBCUTANEOUS at 12:00

## 2025-06-28 RX ADMIN — TAMSULOSIN HYDROCHLORIDE 0.4 MILLIGRAM(S): 0.4 CAPSULE ORAL at 21:42

## 2025-06-28 RX ADMIN — Medication 1000 MILLIGRAM(S): at 07:56

## 2025-06-28 RX ADMIN — OXYBUTYNIN CHLORIDE 5 MILLIGRAM(S): 5 TABLET, FILM COATED, EXTENDED RELEASE ORAL at 19:34

## 2025-06-28 RX ADMIN — Medication 25 GRAM(S): at 14:02

## 2025-06-28 RX ADMIN — DORZOLAMIDE HYDROCHLORIDE AND TIMOLOL MALEATE 1 DROP(S): 20; 5 SOLUTION/ DROPS OPHTHALMIC at 17:10

## 2025-06-28 RX ADMIN — POLYETHYLENE GLYCOL 3350 17 GRAM(S): 17 POWDER, FOR SOLUTION ORAL at 12:01

## 2025-06-28 RX ADMIN — FINASTERIDE 5 MILLIGRAM(S): 1 TABLET, FILM COATED ORAL at 12:01

## 2025-06-28 RX ADMIN — Medication 1 TABLET(S): at 12:01

## 2025-06-28 RX ADMIN — Medication 400 MILLIGRAM(S): at 07:41

## 2025-06-28 RX ADMIN — DORZOLAMIDE HYDROCHLORIDE AND TIMOLOL MALEATE 1 DROP(S): 20; 5 SOLUTION/ DROPS OPHTHALMIC at 05:14

## 2025-06-28 RX ADMIN — EZETIMIBE 10 MILLIGRAM(S): 10 TABLET ORAL at 12:01

## 2025-06-28 NOTE — PROGRESS NOTE ADULT - SUBJECTIVE AND OBJECTIVE BOX
The patient was seen and examined at bedside.  No acute events overnight and the patient is without acute complaints this AM.  Remains on low dose norepinephrine.    T(C): 36.3 (06-28-25 @ 07:15), Max: 36.7 (06-27-25 @ 19:00)  HR: 65 (06-28-25 @ 09:00) (57 - 75)  BP: 103/51 (06-28-25 @ 08:30) (94/48 - 120/32)  RR: 17 (06-28-25 @ 09:00) (7 - 26)  SpO2: 96% (06-28-25 @ 09:00) (91% - 99%)  Wt(kg): --    Physical Exam:    General: NAD, A+Ox3  Abdomen: soft, non-tender, non-distended        06-27 @ 07:01  -  06-28 @ 07:00  --------------------------------------------------------  IN: 64789.9 mL / OUT: 85488 mL / NET: 4625.9 mL      F - CBI, clear yellow urine                            7.5    8.69  )-----------( 106               22.5     135  |  107  |  21  ----------------------------<  128  4.1   |  20  |  1.08    Ca    7.9  Phos  2.5  Mg     2.2

## 2025-06-28 NOTE — PROGRESS NOTE ADULT - SUBJECTIVE AND OBJECTIVE BOX
24 HOUR EVENTS:  - 500 albumin, 1L crystalloid  - echo normal  - 8:1:0:1 in 24 hrs  - txa x1  - RTOR for cystoscopy and bladder fulguration     NEURO  RASS (if intubated): 		[facility] ICU (if concern for delirium):  Exam: AOx4  Meds: acetaminophen   IVPB .. 1000 milliGRAM(s) IV Intermittent every 6 hours      RESPIRATORY  RR: 16 (06-28-25 @ 00:45) (7 - 31)  SpO2: 95% (06-28-25 @ 00:45) (92% - 100%)  Wt(kg): --  Exam: Lungs CTA b/l  Mechanical Ventilation:   ABG - ( 27 Jun 2025 23:41 )  pH: 7.37  /  pCO2: 41    /  pO2: 79    / HCO3: 24    / Base Excess: -1.5  /  SaO2: 99.4    Lactate: x                Meds:     CARDIOVASCULAR  HR: 65 (06-28-25 @ 00:45) (57 - 85)  BP: 120/32 (06-27-25 @ 19:00) (66/32 - 120/32)  BP(mean): 59 (06-27-25 @ 19:00) (42 - 79)  ABP: 120/30 (06-28-25 @ 00:45) (87/34 - 160/57)  ABP(mean): 53 (06-28-25 @ 00:45) (48 - 110)  Wt(kg): --  CVP(cm H2O): --  VBG - ( 27 Jun 2025 03:00 )  pH: 7.21  /  pCO2: 46    /  pO2: 30    / HCO3: 18    / Base Excess: -8.8  /  SaO2: 42.8   Lactate: 4.3                Exam: Normal S1/S2 w/o murmurs or rubs  Cardiac Rhythm: sinus  Perfusion     [x ]Adequate   [ ]Inadequate  Mentation   [x ]Normal       [ ]Reduced  Extremities  [x ]Warm         [ ]Cool  Volume Status [ ]Hypervolemic [x ]Euvolemic [ ]Hypovolemic  Meds: norepinephrine Infusion 0.07 MICROgram(s)/kG/Min IV Continuous <Continuous>      GI/NUTRITION  Exam: abd non distended, non TTP  Diet: regular    Meds: pantoprazole    Tablet 40 milliGRAM(s) Oral before breakfast  polyethylene glycol 3350 17 Gram(s) Oral daily  senna 2 Tablet(s) Oral at bedtime PRN Constipation  senna 1 Tablet(s) Oral daily      GENITOURINARY  I&O's Detail    06-26 @ 07:01 - 06-27 @ 07:00  --------------------------------------------------------  IN:    Continuous Bladder Irrigation (mL): 04799 mL    IV PiggyBack: 100 mL    Lactated Ringers: 1500 mL    Lactated Ringers Bolus: 500 mL    Oral Fluid: 680 mL    Phenylephrine: 697.6 mL    Plasma: 300 mL    PRBCs (Packed Red Blood Cells): 900 mL  Total IN: 84382.6 mL    OUT:    Continuous Bladder Irrigation (mL): 11751 mL  Total OUT: 90639 mL    Total NET: 1977.6 mL      06-27 @ 07:01 - 06-28 @ 00:57  --------------------------------------------------------  IN:    Continuous Bladder Irrigation (mL): 15616 mL    IV PiggyBack: 100 mL    IV PiggyBack: 375 mL    Lactated Ringers: 1600 mL    Norepinephrine: 68.8 mL    Oral Fluid: 120 mL    Phenylephrine: 353.5 mL    PRBCs (Packed Red Blood Cells): 600 mL  Total IN: 77143.3 mL    OUT:    Continuous Bladder Irrigation (mL): 34559 mL  Total OUT: 41250 mL    Total NET: 3117.3 mL        Weight (kg): 91.6 (06-27 @ 09:46)  06-28    135  |  107  |  21  ----------------------------<  128[H]  4.1   |  20[L]  |  1.08    Ca    7.9[L]      28 Jun 2025 00:06  Phos  2.5     06-28  Mg     2.2     06-28      Meds: lactated ringers. 1000 milliLiter(s) IV Continuous <Continuous>  oxybutynin 5 milliGRAM(s) Oral two times a day  sodium chloride 0.9% lock flush 10 milliLiter(s) IV Push every 1 hour PRN Pre/post blood products, medications, blood draw, and to maintain line patency  tamsulosin 0.4 milliGRAM(s) Oral at bedtime      HEMATOLOGIC  Meds:                         7.2    10.24 )-----------( 108      ( 28 Jun 2025 00:05 )             21.5     PT/INR - ( 28 Jun 2025 00:05 )   PT: 13.8 sec;   INR: 1.20 ratio         PTT - ( 28 Jun 2025 00:05 )  PTT:31.3 sec    INFECTIOUS DISEASES  T(C): 36.4 (06-27-25 @ 23:00), Max: 36.7 (06-27-25 @ 02:00)  Wt(kg): --  WBC Count: 10.24 K/uL (06-28 @ 00:05)  WBC Count: 11.80 K/uL (06-27 @ 18:01)  WBC Count: 12.40 K/uL (06-27 @ 13:24)  WBC Count: 14.82 K/uL (06-27 @ 10:25)  WBC Count: 13.07 K/uL (06-27 @ 08:14)  WBC Count: 13.45 K/uL (06-27 @ 07:05)  WBC Count: 11.59 K/uL (06-27 @ 06:14)  WBC Count: 11.40 K/uL (06-27 @ 05:13)  WBC Count: 9.54 K/uL (06-27 @ 04:11)  WBC Count: 9.28 K/uL (06-27 @ 03:05)  WBC Count: 10.38 K/uL (06-27 @ 01:59)    Recent Cultures:  Specimen Source: Catheterized, 06-21 @ 13:03; Results   No growth; Gram Stain: --; Organism: --    Meds: doxycycline monohydrate Capsule 50 milliGRAM(s) Oral daily  fluconAZOLE IVPB 400 milliGRAM(s) IV Intermittent every 24 hours  piperacillin/tazobactam IVPB.. 3.375 Gram(s) IV Intermittent every 8 hours      ENDOCRINE  Capillary Blood Glucose    Meds: ezetimibe 10 milliGRAM(s) Oral daily  finasteride 5 milliGRAM(s) Oral daily      ACCESS DEVICES:  [ ] Peripheral IV  [ ] Central Venous Line		[ ] R	[ ] L	[ ] IJ	[ ] Fem	[ ] SC	Placed:   [ ] Arterial Line			[ ] R	[ ] L	[ ] Fem	[ ] Rad	[ ] Ax	Placed:   [ ] PICC:					[ ] Mediport  [ ] Urinary Catheter, Date Placed:   [ ] Necessity of urinary, arterial, and venous catheters discussed    OTHER MEDICATIONS:  chlorhexidine 2% Cloths 1 Application(s) Topical <User Schedule>  chlorhexidine 4% Liquid 1 Application(s) Topical <User Schedule>  dorzolamide 2%/timolol 0.5% Ophthalmic Solution 1 Drop(s) Both EYES every 12 hours      IMAGING:

## 2025-06-28 NOTE — PROGRESS NOTE ADULT - ASSESSMENT
Neuro:  - pain control w/ tylenol    Resp:  - Out of bed to chair, ambulate as tolerated, and incentive spirometry to prevent atelectasis  - nasal cannula as needed    CV:  - goal MAP > 65 mmHg  - wean juana as tolerated with transfusions  - 1.5L crystalloid 500 albumin  - lactate clear    GI:   - Diet: regular  - Protonix for stress ulcer prophylaxis    Renal:  - Monitor I&Os and electrolytes w/ repletions as necessary  - c/w CBI  - high clot burden in bladder, s/p weiss swap at bedside > RTOR with uro 6/27 for bleeding control   - proscar  - s/p cystoscopy w/ fulguration     Heme:  - Monitor CBC and coags  - s/p 8:1:0:1  - TEG sent  - SCDs for VTE prophylaxis    ID:   - Monitor for clinical evidence of active infection  - Monitor WBC, temperature, and procalcitonin  - Empiric antibiotics zosyn, fluc    Endo:   - Monitor glucose    Code Status: full    Disposition: SICU    Drkae Underwood PA-C  Surgical Intensive Care Unit  [id number]

## 2025-06-28 NOTE — PROGRESS NOTE ADULT - ASSESSMENT
A/P: 84y Male s/p Laser enucleation of the prostate, left ureteral stent removal 6/26, with post op bleeding, hypotension requiring ICU admission, 3U PRBCs, 1U FFP; s/p cysto, clot evacuation, fulguration 6/27    -AM labs reviewed, trend H/H  -wean pressors as tolerated  -continue CBI and traction  -continue zosyn and fluconazole  -Antispasmodics- ditropan  -OOB/DVT ppx/IS

## 2025-06-29 LAB
ANION GAP SERPL CALC-SCNC: 10 MMOL/L — SIGNIFICANT CHANGE UP (ref 5–17)
ANION GAP SERPL CALC-SCNC: 11 MMOL/L — SIGNIFICANT CHANGE UP (ref 5–17)
APTT BLD: 30.6 SEC — SIGNIFICANT CHANGE UP (ref 26.1–36.8)
BLD GP AB SCN SERPL QL: POSITIVE — SIGNIFICANT CHANGE UP
BUN SERPL-MCNC: 10 MG/DL — SIGNIFICANT CHANGE UP (ref 7–23)
BUN SERPL-MCNC: 9 MG/DL — SIGNIFICANT CHANGE UP (ref 7–23)
CALCIUM SERPL-MCNC: 7.7 MG/DL — LOW (ref 8.4–10.5)
CALCIUM SERPL-MCNC: 8 MG/DL — LOW (ref 8.4–10.5)
CHLORIDE SERPL-SCNC: 106 MMOL/L — SIGNIFICANT CHANGE UP (ref 96–108)
CHLORIDE SERPL-SCNC: 106 MMOL/L — SIGNIFICANT CHANGE UP (ref 96–108)
CO2 SERPL-SCNC: 20 MMOL/L — LOW (ref 22–31)
CO2 SERPL-SCNC: 22 MMOL/L — SIGNIFICANT CHANGE UP (ref 22–31)
CREAT SERPL-MCNC: 0.72 MG/DL — SIGNIFICANT CHANGE UP (ref 0.5–1.3)
CREAT SERPL-MCNC: 0.77 MG/DL — SIGNIFICANT CHANGE UP (ref 0.5–1.3)
EGFR: 88 ML/MIN/1.73M2 — SIGNIFICANT CHANGE UP
EGFR: 88 ML/MIN/1.73M2 — SIGNIFICANT CHANGE UP
EGFR: 90 ML/MIN/1.73M2 — SIGNIFICANT CHANGE UP
EGFR: 90 ML/MIN/1.73M2 — SIGNIFICANT CHANGE UP
GLUCOSE SERPL-MCNC: 101 MG/DL — HIGH (ref 70–99)
GLUCOSE SERPL-MCNC: 104 MG/DL — HIGH (ref 70–99)
INR BLD: 1.08 RATIO — SIGNIFICANT CHANGE UP (ref 0.85–1.16)
MAGNESIUM SERPL-MCNC: 2 MG/DL — SIGNIFICANT CHANGE UP (ref 1.6–2.6)
MAGNESIUM SERPL-MCNC: 2 MG/DL — SIGNIFICANT CHANGE UP (ref 1.6–2.6)
PHOSPHATE SERPL-MCNC: 2.4 MG/DL — LOW (ref 2.5–4.5)
PHOSPHATE SERPL-MCNC: 2.5 MG/DL — SIGNIFICANT CHANGE UP (ref 2.5–4.5)
POTASSIUM SERPL-MCNC: 3.6 MMOL/L — SIGNIFICANT CHANGE UP (ref 3.5–5.3)
POTASSIUM SERPL-MCNC: 3.6 MMOL/L — SIGNIFICANT CHANGE UP (ref 3.5–5.3)
POTASSIUM SERPL-SCNC: 3.6 MMOL/L — SIGNIFICANT CHANGE UP (ref 3.5–5.3)
POTASSIUM SERPL-SCNC: 3.6 MMOL/L — SIGNIFICANT CHANGE UP (ref 3.5–5.3)
PROCALCITONIN SERPL-MCNC: 0.08 NG/ML — SIGNIFICANT CHANGE UP (ref 0.02–0.1)
PROTHROM AB SERPL-ACNC: 12.3 SEC — SIGNIFICANT CHANGE UP (ref 9.9–13.4)
RH IG SCN BLD-IMP: NEGATIVE — SIGNIFICANT CHANGE UP
SODIUM SERPL-SCNC: 137 MMOL/L — SIGNIFICANT CHANGE UP (ref 135–145)
SODIUM SERPL-SCNC: 138 MMOL/L — SIGNIFICANT CHANGE UP (ref 135–145)

## 2025-06-29 PROCEDURE — 36415 COLL VENOUS BLD VENIPUNCTURE: CPT

## 2025-06-29 PROCEDURE — 97165 OT EVAL LOW COMPLEX 30 MIN: CPT

## 2025-06-29 PROCEDURE — P9059: CPT

## 2025-06-29 PROCEDURE — 99233 SBSQ HOSP IP/OBS HIGH 50: CPT

## 2025-06-29 PROCEDURE — 71045 X-RAY EXAM CHEST 1 VIEW: CPT

## 2025-06-29 PROCEDURE — 84132 ASSAY OF SERUM POTASSIUM: CPT

## 2025-06-29 PROCEDURE — 82550 ASSAY OF CK (CPK): CPT

## 2025-06-29 PROCEDURE — C1782: CPT

## 2025-06-29 PROCEDURE — 85610 PROTHROMBIN TIME: CPT

## 2025-06-29 PROCEDURE — 82435 ASSAY OF BLOOD CHLORIDE: CPT

## 2025-06-29 PROCEDURE — 83735 ASSAY OF MAGNESIUM: CPT

## 2025-06-29 PROCEDURE — P9012: CPT

## 2025-06-29 PROCEDURE — 36000 PLACE NEEDLE IN VEIN: CPT

## 2025-06-29 PROCEDURE — C1889: CPT

## 2025-06-29 PROCEDURE — 85014 HEMATOCRIT: CPT

## 2025-06-29 PROCEDURE — 85730 THROMBOPLASTIN TIME PARTIAL: CPT

## 2025-06-29 PROCEDURE — 84145 PROCALCITONIN (PCT): CPT

## 2025-06-29 PROCEDURE — C9399: CPT

## 2025-06-29 PROCEDURE — 86965 POOLING BLOOD PLATELETS: CPT

## 2025-06-29 PROCEDURE — 86922 COMPATIBILITY TEST ANTIGLOB: CPT

## 2025-06-29 PROCEDURE — 82330 ASSAY OF CALCIUM: CPT

## 2025-06-29 PROCEDURE — P9016: CPT

## 2025-06-29 PROCEDURE — 97162 PT EVAL MOD COMPLEX 30 MIN: CPT

## 2025-06-29 PROCEDURE — 86901 BLOOD TYPING SEROLOGIC RH(D): CPT

## 2025-06-29 PROCEDURE — 86902 BLOOD TYPE ANTIGEN DONOR EA: CPT

## 2025-06-29 PROCEDURE — 84295 ASSAY OF SERUM SODIUM: CPT

## 2025-06-29 PROCEDURE — 82947 ASSAY GLUCOSE BLOOD QUANT: CPT

## 2025-06-29 PROCEDURE — P9040: CPT

## 2025-06-29 PROCEDURE — 84484 ASSAY OF TROPONIN QUANT: CPT

## 2025-06-29 PROCEDURE — 85018 HEMOGLOBIN: CPT

## 2025-06-29 PROCEDURE — 86900 BLOOD TYPING SEROLOGIC ABO: CPT

## 2025-06-29 PROCEDURE — C8929: CPT

## 2025-06-29 PROCEDURE — 85396 CLOTTING ASSAY WHOLE BLOOD: CPT

## 2025-06-29 PROCEDURE — 76937 US GUIDE VASCULAR ACCESS: CPT | Mod: 26

## 2025-06-29 PROCEDURE — 85027 COMPLETE CBC AUTOMATED: CPT

## 2025-06-29 PROCEDURE — C1769: CPT

## 2025-06-29 PROCEDURE — 86880 COOMBS TEST DIRECT: CPT

## 2025-06-29 PROCEDURE — 82553 CREATINE MB FRACTION: CPT

## 2025-06-29 PROCEDURE — 86850 RBC ANTIBODY SCREEN: CPT

## 2025-06-29 PROCEDURE — 80048 BASIC METABOLIC PNL TOTAL CA: CPT

## 2025-06-29 PROCEDURE — 86870 RBC ANTIBODY IDENTIFICATION: CPT

## 2025-06-29 PROCEDURE — 83605 ASSAY OF LACTIC ACID: CPT

## 2025-06-29 PROCEDURE — 82803 BLOOD GASES ANY COMBINATION: CPT

## 2025-06-29 PROCEDURE — 84100 ASSAY OF PHOSPHORUS: CPT

## 2025-06-29 RX ORDER — AMLODIPINE BESYLATE 10 MG/1
2.5 TABLET ORAL DAILY
Refills: 0 | Status: DISCONTINUED | OUTPATIENT
Start: 2025-06-29 | End: 2025-07-02

## 2025-06-29 RX ORDER — POTASSIUM PHOSPHATE, MONOBASIC POTASSIUM PHOSPHATE, DIBASIC INJECTION, 236; 224 MG/ML; MG/ML
15 SOLUTION, CONCENTRATE INTRAVENOUS ONCE
Refills: 0 | Status: COMPLETED | OUTPATIENT
Start: 2025-06-29 | End: 2025-06-29

## 2025-06-29 RX ORDER — FUROSEMIDE 10 MG/ML
40 INJECTION INTRAMUSCULAR; INTRAVENOUS
Refills: 0 | Status: DISCONTINUED | OUTPATIENT
Start: 2025-06-29 | End: 2025-07-02

## 2025-06-29 RX ORDER — SODIUM CHLORIDE 9 G/1000ML
1000 INJECTION, SOLUTION INTRAVENOUS
Refills: 0 | Status: DISCONTINUED | OUTPATIENT
Start: 2025-06-29 | End: 2025-06-30

## 2025-06-29 RX ORDER — SODIUM PHOSPHATE,DIBASIC DIHYD
30 POWDER (GRAM) MISCELLANEOUS ONCE
Refills: 0 | Status: COMPLETED | OUTPATIENT
Start: 2025-06-29 | End: 2025-06-29

## 2025-06-29 RX ORDER — PIPERACILLIN-TAZO-DEXTROSE,ISO 3.375G/5
3.38 IV SOLUTION, PIGGYBACK PREMIX FROZEN(ML) INTRAVENOUS EVERY 8 HOURS
Refills: 0 | Status: DISCONTINUED | OUTPATIENT
Start: 2025-06-29 | End: 2025-07-02

## 2025-06-29 RX ORDER — FLUCONAZOLE 150 MG
400 TABLET ORAL EVERY 24 HOURS
Refills: 0 | Status: DISCONTINUED | OUTPATIENT
Start: 2025-06-30 | End: 2025-07-02

## 2025-06-29 RX ORDER — SODIUM PHOSPHATE,DIBASIC DIHYD
15 POWDER (GRAM) MISCELLANEOUS ONCE
Refills: 0 | Status: COMPLETED | OUTPATIENT
Start: 2025-06-29 | End: 2025-06-29

## 2025-06-29 RX ORDER — DOXYCYCLINE HYCLATE 100 MG
50 TABLET ORAL DAILY
Refills: 0 | Status: DISCONTINUED | OUTPATIENT
Start: 2025-06-29 | End: 2025-06-29

## 2025-06-29 RX ADMIN — DORZOLAMIDE HYDROCHLORIDE AND TIMOLOL MALEATE 1 DROP(S): 20; 5 SOLUTION/ DROPS OPHTHALMIC at 17:09

## 2025-06-29 RX ADMIN — Medication 25 GRAM(S): at 05:30

## 2025-06-29 RX ADMIN — OXYBUTYNIN CHLORIDE 5 MILLIGRAM(S): 5 TABLET, FILM COATED, EXTENDED RELEASE ORAL at 21:04

## 2025-06-29 RX ADMIN — DORZOLAMIDE HYDROCHLORIDE AND TIMOLOL MALEATE 1 DROP(S): 20; 5 SOLUTION/ DROPS OPHTHALMIC at 05:30

## 2025-06-29 RX ADMIN — Medication 255 MILLIMOLE(S): at 04:12

## 2025-06-29 RX ADMIN — ENOXAPARIN SODIUM 40 MILLIGRAM(S): 100 INJECTION SUBCUTANEOUS at 10:06

## 2025-06-29 RX ADMIN — Medication 40 MILLIGRAM(S): at 06:23

## 2025-06-29 RX ADMIN — POTASSIUM PHOSPHATE, MONOBASIC POTASSIUM PHOSPHATE, DIBASIC INJECTION, 62.5 MILLIMOLE(S): 236; 224 SOLUTION, CONCENTRATE INTRAVENOUS at 12:59

## 2025-06-29 RX ADMIN — Medication 25 GRAM(S): at 21:04

## 2025-06-29 RX ADMIN — Medication 5 MILLILITER(S): at 17:08

## 2025-06-29 RX ADMIN — Medication 100 MILLIGRAM(S): at 14:16

## 2025-06-29 RX ADMIN — Medication 1 TABLET(S): at 11:51

## 2025-06-29 RX ADMIN — TAMSULOSIN HYDROCHLORIDE 0.4 MILLIGRAM(S): 0.4 CAPSULE ORAL at 21:05

## 2025-06-29 RX ADMIN — Medication 255 MILLIMOLE(S): at 06:18

## 2025-06-29 RX ADMIN — EZETIMIBE 10 MILLIGRAM(S): 10 TABLET ORAL at 11:52

## 2025-06-29 RX ADMIN — Medication 1 APPLICATION(S): at 05:31

## 2025-06-29 RX ADMIN — POLYETHYLENE GLYCOL 3350 17 GRAM(S): 17 POWDER, FOR SOLUTION ORAL at 11:51

## 2025-06-29 RX ADMIN — Medication 25 GRAM(S): at 13:11

## 2025-06-29 RX ADMIN — SODIUM CHLORIDE 75 MILLILITER(S): 9 INJECTION, SOLUTION INTRAVENOUS at 18:45

## 2025-06-29 RX ADMIN — AMLODIPINE BESYLATE 2.5 MILLIGRAM(S): 10 TABLET ORAL at 11:52

## 2025-06-29 RX ADMIN — Medication 5 MILLILITER(S): at 05:31

## 2025-06-29 RX ADMIN — FINASTERIDE 5 MILLIGRAM(S): 1 TABLET, FILM COATED ORAL at 11:51

## 2025-06-29 RX ADMIN — OXYBUTYNIN CHLORIDE 5 MILLIGRAM(S): 5 TABLET, FILM COATED, EXTENDED RELEASE ORAL at 05:30

## 2025-06-29 RX ADMIN — OXYBUTYNIN CHLORIDE 5 MILLIGRAM(S): 5 TABLET, FILM COATED, EXTENDED RELEASE ORAL at 11:52

## 2025-06-29 NOTE — PROGRESS NOTE ADULT - ASSESSMENT
A/P: 84y Male s/p Laser enucleation of the prostate, left ureteral stent removal 6/26, with post op bleeding, hypotension requiring ICU admission, 3U PRBCs, 1U FFP; s/p cysto, clot evacuation, fulguration 6/27    -AM labs, trend H/H  -continue CBI and traction  -continue zosyn and fluconazole  -Antispasmodics- ditropan  -OOB/DVT ppx/IS A/P: 84y Male s/p Laser enucleation of the prostate, left ureteral stent removal 6/26, with post op bleeding, hypotension requiring ICU admission, 3U PRBCs, 1U FFP; s/p cysto, clot evacuation, fulguration 6/27    -AM labs, trend H/H  -continue zosyn and fluconazole  -monitor urine color - currently clear off CBI  -Antispasmodics- ditropan  -OOB/DVT ppx/IS

## 2025-06-29 NOTE — PROGRESS NOTE ADULT - SUBJECTIVE AND OBJECTIVE BOX
The patient was seen and examined at bedside.  No acute events overnight and the patient is without acute complaints this AM.  Pt was weaned off of pressors yesterday.    T(C): 36.7 (06-29-25 @ 04:00), Max: 37.1 (06-28-25 @ 11:00)  HR: 76 (06-29-25 @ 04:00) (59 - 82)  BP: 103/51 (06-28-25 @ 08:30) (103/51 - 103/51)  RR: 21 (06-29-25 @ 04:00) (13 - 38)  SpO2: 94% (06-29-25 @ 04:00) (90% - 97%)  Wt(kg): --    Physical Exam:    General: NAD, A+Ox3  Abdomen: soft, non-tender, non-distended        06-27 @ 07:01  -  06-28 @ 07:00  --------------------------------------------------------  IN: 59051.9 mL / OUT: 14074 mL / NET: 4625.9 mL    06-28 @ 07:01  -  06-29 @ 06:57  --------------------------------------------------------  IN: 4568.4 mL / OUT: 5520 mL / NET: -951.6 mL      F - CBI running clear     The patient was seen and examined at bedside.  No acute events overnight and the patient is without acute complaints this AM.  Pt was weaned off of pressors yesterday.    T(C): 36.7 (06-29-25 @ 04:00), Max: 37.1 (06-28-25 @ 11:00)  HR: 76 (06-29-25 @ 04:00) (59 - 82)  BP: 103/51 (06-28-25 @ 08:30) (103/51 - 103/51)  RR: 21 (06-29-25 @ 04:00) (13 - 38)  SpO2: 94% (06-29-25 @ 04:00) (90% - 97%)  Wt(kg): --    Physical Exam:    General: NAD, A+Ox3  Abdomen: soft, non-tender, non-distended        06-27 @ 07:01  -  06-28 @ 07:00  --------------------------------------------------------  IN: 18784.9 mL / OUT: 68604 mL / NET: 4625.9 mL    06-28 @ 07:01  -  06-29 @ 06:57  --------------------------------------------------------  IN: 4568.4 mL / OUT: 5520 mL / NET: -951.6 mL      F - 2L, clear yellow     The patient was seen and examined at bedside.  No acute events overnight and the patient is without acute complaints this AM.  Pt was weaned off of pressors yesterday.  CBI was also clamped yesterday.    T(C): 36.7 (06-29-25 @ 04:00), Max: 37.1 (06-28-25 @ 11:00)  HR: 76 (06-29-25 @ 04:00) (59 - 82)  BP: 103/51 (06-28-25 @ 08:30) (103/51 - 103/51)  RR: 21 (06-29-25 @ 04:00) (13 - 38)  SpO2: 94% (06-29-25 @ 04:00) (90% - 97%)  Wt(kg): --    Physical Exam:    General: NAD, A+Ox3  Abdomen: soft, non-tender, non-distended        06-27 @ 07:01  -  06-28 @ 07:00  --------------------------------------------------------  IN: 72905.9 mL / OUT: 28855 mL / NET: 4625.9 mL    06-28 @ 07:01  -  06-29 @ 06:57  --------------------------------------------------------  IN: 4568.4 mL / OUT: 5520 mL / NET: -951.6 mL      F - 2L, clear yellow

## 2025-06-29 NOTE — PHYSICAL THERAPY INITIAL EVALUATION ADULT - PERTINENT HX OF CURRENT PROBLEM, REHAB EVAL
85 yo male with CAD s/p coronary stents x5 last 2019, paroxysmal afib s/p ablation 2017,2021, HTn.HLD, polymalgia rheumatica ,loop recorder implant, LY, COPD, anemia requiring several transfusions in May,  followed by lolly Andrew- possible mild MDS from bone marrow biopsy, with initial dose aranesp to be given this week), s/p mitraclip 6/2024 for mitral regurgiation. pt with recent hospitalization [facility] 4/30/25-5/13/25 for left kidney stone, Now s/p left ureteroscopy stent insertion on 6/19/25. Now presents for Left ureteral stent removal and laser enucleation of prostate with morcellation. SICU c/s post op for phenylephrine requirements.

## 2025-06-29 NOTE — PROGRESS NOTE ADULT - ASSESSMENT
Neuro:  - pain control w/ tylenol    Resp:  - Out of bed to chair, ambulate as tolerated, and incentive spirometry to prevent atelectasis  - nasal cannula as needed    CV:  - goal SBP > 100 mmHg  - monitor off pressors   - lactate clear    GI:   - Diet: regular  - PPI    Renal:  - Monitor I&Os and electrolytes w/ repletions as necessary  - CBI off  - high clot burden in bladder, s/p weiss swap at bedside > RTOR with uro 6/27 for bleeding control   - proscar  - s/p cystoscopy w/ fulguration     Heme:  - Monitor CBC and coags  - s/p 8:1:0:1  - TEG sent  - LVX    ID:   - Monitor for clinical evidence of active infection  - Monitor WBC, temperature, and procalcitonin  - Empiric antibiotics zosyn, fluc    Endo:   - Monitor glucose    Code Status: full    Disposition: listed for floor    Drake Underwood PA-C  Surgical Intensive Care Unit  [id number]

## 2025-06-29 NOTE — PROGRESS NOTE ADULT - SUBJECTIVE AND OBJECTIVE BOX
24 HOUR EVENTS:  - CBI stopped  - IVL    NEURO  RASS (if intubated): 		[facility] ICU (if concern for delirium):  Exam: AOx4  Meds:     RESPIRATORY  RR: 38 (06-29-25 @ 00:00) (13 - 38)  SpO2: 90% (06-29-25 @ 00:00) (90% - 97%)  Wt(kg): --  Exam: Lungs CTA b/l  Mechanical Ventilation:   ABG - ( 28 Jun 2025 10:00 )  pH: 7.38  /  pCO2: 39    /  pO2: 87    / HCO3: 23    / Base Excess: -1.8  /  SaO2: 98.0    Lactate: x                Meds:     CARDIOVASCULAR  HR: 74 (06-29-25 @ 00:00) (59 - 82)  BP: 103/51 (06-28-25 @ 08:30) (103/51 - 103/51)  BP(mean): 74 (06-28-25 @ 08:30) (74 - 74)  ABP: 158/51 (06-29-25 @ 00:00) (109/41 - 160/50)  ABP(mean): 86 (06-29-25 @ 00:00) (48 - 90)  Wt(kg): --  CVP(cm H2O): --  VBG - ( 27 Jun 2025 03:00 )  pH: 7.21  /  pCO2: 46    /  pO2: 30    / HCO3: 18    / Base Excess: -8.8  /  SaO2: 42.8   Lactate: 4.3                Exam: Normal S1/S2 w/o murmurs or rubs  Cardiac Rhythm: sinus  Perfusion     [x ]Adequate   [ ]Inadequate  Mentation   [x ]Normal       [ ]Reduced  Extremities  [x ]Warm         [ ]Cool  Volume Status [ ]Hypervolemic [x ]Euvolemic [ ]Hypovolemic  Meds:     GI/NUTRITION  Exam: abd non distended, non TTP  Diet: regular    Meds: pantoprazole    Tablet 40 milliGRAM(s) Oral before breakfast  polyethylene glycol 3350 17 Gram(s) Oral daily  senna 2 Tablet(s) Oral at bedtime PRN Constipation  senna 1 Tablet(s) Oral daily      GENITOURINARY  I&O's Detail    06-27 @ 07:01  -  06-28 @ 07:00  --------------------------------------------------------  IN:    Continuous Bladder Irrigation (mL): 21325 mL    IV PiggyBack: 100 mL    IV PiggyBack: 550 mL    Lactated Ringers: 2125 mL    Norepinephrine: 77.4 mL    Oral Fluid: 120 mL    Phenylephrine: 353.5 mL    PRBCs (Packed Red Blood Cells): 900 mL  Total IN: 64824.9 mL    OUT:    Continuous Bladder Irrigation (mL): 22491 mL  Total OUT: 67485 mL    Total NET: 4625.9 mL      06-28 @ 07:01  -  06-29 @ 01:26  --------------------------------------------------------  IN:    Continuous Bladder Irrigation (mL): 2125 mL    IV PiggyBack: 200 mL    IV PiggyBack: 150 mL    Lactated Ringers: 225 mL    Norepinephrine: 3.4 mL    Oral Fluid: 1040 mL  Total IN: 3743.4 mL    OUT:    Continuous Bladder Irrigation (mL): 1600 mL    Indwelling Catheter - Urethral (mL): 2750 mL  Total OUT: 4350 mL    Total NET: -606.6 mL          06-28    136  |  108  |  14  ----------------------------<  94  3.9   |  21[L]  |  0.93    Ca    7.9[L]      28 Jun 2025 18:17  Phos  1.6     06-28  Mg     2.0     06-28      Meds: oxybutynin 5 milliGRAM(s) Oral three times a day  sodium chloride 0.9% lock flush 10 milliLiter(s) IV Push every 1 hour PRN Pre/post blood products, medications, blood draw, and to maintain line patency  tamsulosin 0.4 milliGRAM(s) Oral at bedtime      HEMATOLOGIC  Meds: enoxaparin Injectable 40 milliGRAM(s) SubCutaneous every 24 hours                          7.4    8.36  )-----------( 102      ( 28 Jun 2025 18:17 )             21.9     PT/INR - ( 28 Jun 2025 00:05 )   PT: 13.8 sec;   INR: 1.20 ratio         PTT - ( 28 Jun 2025 00:05 )  PTT:31.3 sec    INFECTIOUS DISEASES  T(C): 36.6 (06-28-25 @ 23:00), Max: 37.1 (06-28-25 @ 11:00)  Wt(kg): --  WBC Count: 8.36 K/uL (06-28 @ 18:17)  WBC Count: 10.32 K/uL (06-28 @ 10:12)  WBC Count: 8.69 K/uL (06-28 @ 04:33)    Recent Cultures:    Meds: fluconAZOLE IVPB 400 milliGRAM(s) IV Intermittent every 24 hours  piperacillin/tazobactam IVPB.. 3.375 Gram(s) IV Intermittent every 8 hours      ENDOCRINE  Capillary Blood Glucose    Meds: ezetimibe 10 milliGRAM(s) Oral daily  finasteride 5 milliGRAM(s) Oral daily      ACCESS DEVICES:  [ ] Peripheral IV  [ ] Central Venous Line		[ ] R	[ ] L	[ ] IJ	[ ] Fem	[ ] SC	Placed:   [ ] Arterial Line			[ ] R	[ ] L	[ ] Fem	[ ] Rad	[ ] Ax	Placed:   [ ] PICC:					[ ] Mediport  [ ] Urinary Catheter, Date Placed:   [ ] Necessity of urinary, arterial, and venous catheters discussed    OTHER MEDICATIONS:  Biotene Dry Mouth Oral Rinse 5 milliLiter(s) Swish and Spit two times a day  chlorhexidine 2% Cloths 1 Application(s) Topical <User Schedule>  dorzolamide 2%/timolol 0.5% Ophthalmic Solution 1 Drop(s) Both EYES every 12 hours      IMAGING:

## 2025-06-29 NOTE — PHYSICAL THERAPY INITIAL EVALUATION ADULT - ADDITIONAL COMMENTS
Pt lives with spouse in private home, no steps to enter; first floor set-up; independent prior to admission with use of SC; owns RW.

## 2025-06-29 NOTE — OCCUPATIONAL THERAPY INITIAL EVALUATION ADULT - PERTINENT HX OF CURRENT PROBLEM, REHAB EVAL
85 yo male with CAD s/p coronary stents x5 last 2019, paroxysmal afib s/p ablation 2017,2021, HTn.HLD, polymalgia rheumatica ,loop recorder implant, LY, COPD, anemia requiring several transfusions in May,  followed by lolly Andrew- possible mild MDS from bone marrow biopsy, with initial dose aranesp to be given this week), s/p mitraclip 6/2024 for mitral regurgiation. pt with recent hospitalization [facility] 4/30/25-5/13/25 for left kidney stone, Now s/p left ureteroscopy stent insertion on 6/19/25. Now presents for Left ureteral stent removal and laser enucleation of prostate with morcellation. SICU c/s post op for phenylephrine requirements.    6/26 Left ureteral stent removal and laser enucleation of prostate with morcellation

## 2025-06-29 NOTE — PHYSICAL THERAPY INITIAL EVALUATION ADULT - PRECAUTIONS/LIMITATIONS, REHAB EVAL
ED Purposeful Rounding: Patient updated with plan of care. Patient pain, bathroom needs, positioning and comfort (warm blankets, dim lights, TV, Bed positioning, personal items) needs addressed. Patient given and instructed on use of call light.       fall precautions

## 2025-06-29 NOTE — PROGRESS NOTE ADULT - NS ATTEND AMEND GEN_ALL_CORE FT
84 yr Hx of HTN CAD w multiple stents, MDS w prostate enucleation complication with bleeding and syncope in PACU    ON: return from OR, improving bleeding      aaox4, sleeping comfortably   tylenol for pain  on RA, hx of COPD  CXR bibasilar congestion, likely from TACO  off vasopressors, SBP goal 110  s/p hemorrhagic shock  TTE mild LA enlargement, rt sided pressures elevated, mitraclip well seated   clear lact  reg diet  protonix   monitor for BMs  CBI, urine output inaccurate, appears serosang  Cr 0.8 ->1  urology following, CBI cleared, katheryn stop  restarted BPH   anemic at baseline, hb 7, plt nl  restart lovenox VTE PPX now that cleared  no e/o ongoing bleeding  normothermic, zosyn empiric   procal tomorrow  cultures so far ngtd  good glycemic control  PT OT when able      rt IJ CVC , rad a line
84 yr Hx of HTN CAD w multiple stents, MDS w prostate enucleation complication with bleeding and syncope in PACU    ON: no major events      aaox4, awake and comfortable in bed   tylenol for pain  on RA, hx of COPD  no AM CXR   hypertensive now, restart amlodipine 2.5 daily   resolved hemorrhagic shock  clear lact  reg diet  protonix   monitor for BMs  weiss in place, balance negative -1 lit, made adequate urine output  Cr back to baseline  urology following  restarted BPH meds  hold lasix today given autodiuresis   anemic at baseline, hb 7.4, plt nl  lovenox VTE PPX   no e/o ongoing bleeding  normothermic, zosyn and fluc empiric to complete today given procal 0.08  cultures so far ngtd  good glycemic control  PT OT when able     remove CVC and a line

## 2025-06-30 LAB
ADD ON TEST-SPECIMEN IN LAB: SIGNIFICANT CHANGE UP
ANION GAP SERPL CALC-SCNC: 8 MMOL/L — SIGNIFICANT CHANGE UP (ref 5–17)
BUN SERPL-MCNC: 8 MG/DL — SIGNIFICANT CHANGE UP (ref 7–23)
CALCIUM SERPL-MCNC: 8.5 MG/DL — SIGNIFICANT CHANGE UP (ref 8.4–10.5)
CHLORIDE SERPL-SCNC: 106 MMOL/L — SIGNIFICANT CHANGE UP (ref 96–108)
CO2 SERPL-SCNC: 25 MMOL/L — SIGNIFICANT CHANGE UP (ref 22–31)
CREAT SERPL-MCNC: 0.68 MG/DL — SIGNIFICANT CHANGE UP (ref 0.5–1.3)
EGFR: 92 ML/MIN/1.73M2 — SIGNIFICANT CHANGE UP
EGFR: 92 ML/MIN/1.73M2 — SIGNIFICANT CHANGE UP
GLUCOSE SERPL-MCNC: 110 MG/DL — HIGH (ref 70–99)
HCT VFR BLD CALC: 25.6 % — LOW (ref 39–50)
HGB BLD-MCNC: 8.1 G/DL — LOW (ref 13–17)
MCHC RBC-ENTMCNC: 29 PG — SIGNIFICANT CHANGE UP (ref 27–34)
MCHC RBC-ENTMCNC: 31.6 G/DL — LOW (ref 32–36)
MCV RBC AUTO: 91.8 FL — SIGNIFICANT CHANGE UP (ref 80–100)
NRBC # BLD AUTO: 0 K/UL — SIGNIFICANT CHANGE UP (ref 0–0)
NRBC # FLD: 0 K/UL — SIGNIFICANT CHANGE UP (ref 0–0)
NRBC BLD AUTO-RTO: 0 /100 WBCS — SIGNIFICANT CHANGE UP (ref 0–0)
NT-PROBNP SERPL-SCNC: 3037 PG/ML — HIGH (ref 0–300)
PLATELET # BLD AUTO: 133 K/UL — LOW (ref 150–400)
PMV BLD: 8.9 FL — SIGNIFICANT CHANGE UP (ref 7–13)
POTASSIUM SERPL-MCNC: 3.8 MMOL/L — SIGNIFICANT CHANGE UP (ref 3.5–5.3)
POTASSIUM SERPL-SCNC: 3.8 MMOL/L — SIGNIFICANT CHANGE UP (ref 3.5–5.3)
RBC # BLD: 2.79 M/UL — LOW (ref 4.2–5.8)
RBC # FLD: 17.5 % — HIGH (ref 10.3–14.5)
SODIUM SERPL-SCNC: 139 MMOL/L — SIGNIFICANT CHANGE UP (ref 135–145)
WBC # BLD: 7.94 K/UL — SIGNIFICANT CHANGE UP (ref 3.8–10.5)
WBC # FLD AUTO: 7.94 K/UL — SIGNIFICANT CHANGE UP (ref 3.8–10.5)

## 2025-06-30 PROCEDURE — 71045 X-RAY EXAM CHEST 1 VIEW: CPT | Mod: 26

## 2025-06-30 RX ADMIN — ENOXAPARIN SODIUM 40 MILLIGRAM(S): 100 INJECTION SUBCUTANEOUS at 11:57

## 2025-06-30 RX ADMIN — Medication 25 GRAM(S): at 16:06

## 2025-06-30 RX ADMIN — FINASTERIDE 5 MILLIGRAM(S): 1 TABLET, FILM COATED ORAL at 11:57

## 2025-06-30 RX ADMIN — DORZOLAMIDE HYDROCHLORIDE AND TIMOLOL MALEATE 1 DROP(S): 20; 5 SOLUTION/ DROPS OPHTHALMIC at 05:32

## 2025-06-30 RX ADMIN — OXYBUTYNIN CHLORIDE 5 MILLIGRAM(S): 5 TABLET, FILM COATED, EXTENDED RELEASE ORAL at 11:58

## 2025-06-30 RX ADMIN — Medication 40 MILLIGRAM(S): at 05:27

## 2025-06-30 RX ADMIN — Medication 100 MILLIGRAM(S): at 04:53

## 2025-06-30 RX ADMIN — Medication 25 GRAM(S): at 06:50

## 2025-06-30 RX ADMIN — FUROSEMIDE 40 MILLIGRAM(S): 10 INJECTION INTRAMUSCULAR; INTRAVENOUS at 05:28

## 2025-06-30 RX ADMIN — Medication 1 APPLICATION(S): at 11:58

## 2025-06-30 RX ADMIN — OXYBUTYNIN CHLORIDE 5 MILLIGRAM(S): 5 TABLET, FILM COATED, EXTENDED RELEASE ORAL at 22:03

## 2025-06-30 RX ADMIN — TAMSULOSIN HYDROCHLORIDE 0.4 MILLIGRAM(S): 0.4 CAPSULE ORAL at 22:03

## 2025-06-30 RX ADMIN — Medication 5 MILLILITER(S): at 17:50

## 2025-06-30 RX ADMIN — FUROSEMIDE 40 MILLIGRAM(S): 10 INJECTION INTRAMUSCULAR; INTRAVENOUS at 16:05

## 2025-06-30 RX ADMIN — OXYBUTYNIN CHLORIDE 5 MILLIGRAM(S): 5 TABLET, FILM COATED, EXTENDED RELEASE ORAL at 05:27

## 2025-06-30 RX ADMIN — EZETIMIBE 10 MILLIGRAM(S): 10 TABLET ORAL at 11:57

## 2025-06-30 RX ADMIN — DORZOLAMIDE HYDROCHLORIDE AND TIMOLOL MALEATE 1 DROP(S): 20; 5 SOLUTION/ DROPS OPHTHALMIC at 17:48

## 2025-06-30 RX ADMIN — Medication 1 LOZENGE: at 06:58

## 2025-06-30 RX ADMIN — POLYETHYLENE GLYCOL 3350 17 GRAM(S): 17 POWDER, FOR SOLUTION ORAL at 11:57

## 2025-06-30 RX ADMIN — Medication 5 MILLILITER(S): at 05:28

## 2025-06-30 RX ADMIN — Medication 25 GRAM(S): at 22:03

## 2025-06-30 RX ADMIN — Medication 1 TABLET(S): at 11:57

## 2025-06-30 RX ADMIN — AMLODIPINE BESYLATE 2.5 MILLIGRAM(S): 10 TABLET ORAL at 05:28

## 2025-06-30 NOTE — CONSULT NOTE ADULT - SUBJECTIVE AND OBJECTIVE BOX
Patient is a 84y old  Male s/p prostate ablation (29 Jun 2025 01:24)      HPI:  83 yo male with CAD s/p coronary stents x5 last 2019, paroxysmal afib s/p ablation 2017,2021, HTn.HLD, polymalgia rheumatica ,loop recorder implant, LY, COPD, anemia requiring several transfusions in May,  followed by lolly Andrew- possible mild MDS from bone marrow biopsy, with initial dose aranesp to be given this week), s/p mitraclip 6/2024 for mitral regurgiation. pt with recent hospitalization [facility] 4/30/25-5/13/25 for left kidney stone, Now s/p left ureteroscopy stent insertion on 6/19/25. S/p Left ureteral stent removal and laser enucleation of prostate with morcellation.  Post op SOB on supp O2.      PAST MEDICAL & SURGICAL HISTORY:  CAD (coronary artery disease)      HTN (hypertension)      HLD (hyperlipidemia)      Paroxysmal atrial fibrillation      History of loop recorder      H/O polymyalgia rheumatica      S/P PTCA (percutaneous transluminal coronary angioplasty)      LY (obstructive sleep apnea)      Anemia      History of bone marrow biopsy      MDS (myelodysplastic syndrome), low grade      COPD, mild      BPH (benign prostatic hyperplasia)      Kidney stone      H/O mitral valve repair      S/P total knee replacement, left      History of loop recorder      H/O insertion of nephrostomy tube      S/P cataract surgery          Review of Systems:   CONSTITUTIONAL: No fever,  or fatigue  NECK: No pain or stiffness  RESPIRATORY: Cough, Supp O2  CARDIOVASCULAR: No chest pain, palpitations  GASTROINTESTINAL: No abdominal  pain. No nausea, vomiting.  NEUROLOGICAL: No headaches,           Allergies    No Known Allergies    Intolerances        Social History:     FAMILY HISTORY:      MEDICATIONS  (STANDING):  amLODIPine   Tablet 2.5 milliGRAM(s) Oral daily  Biotene Dry Mouth Oral Rinse 5 milliLiter(s) Swish and Spit two times a day  chlorhexidine 2% Cloths 1 Application(s) Topical <User Schedule>  dorzolamide 2%/timolol 0.5% Ophthalmic Solution 1 Drop(s) Both EYES every 12 hours  enoxaparin Injectable 40 milliGRAM(s) SubCutaneous every 24 hours  ezetimibe 10 milliGRAM(s) Oral daily  finasteride 5 milliGRAM(s) Oral daily  fluconAZOLE IVPB 400 milliGRAM(s) IV Intermittent every 24 hours  furosemide    Tablet 40 milliGRAM(s) Oral two times a day  lactated ringers. 1000 milliLiter(s) (75 mL/Hr) IV Continuous <Continuous>  oxybutynin 5 milliGRAM(s) Oral three times a day  pantoprazole    Tablet 40 milliGRAM(s) Oral before breakfast  piperacillin/tazobactam IVPB.. 3.375 Gram(s) IV Intermittent every 8 hours  polyethylene glycol 3350 17 Gram(s) Oral daily  senna 1 Tablet(s) Oral daily  tamsulosin 0.4 milliGRAM(s) Oral at bedtime    MEDICATIONS  (PRN):  benzocaine/menthol Lozenge 1 Lozenge Oral every 6 hours PRN Sore Throat  senna 2 Tablet(s) Oral at bedtime PRN Constipation  sodium chloride 0.9% lock flush 10 milliLiter(s) IV Push every 1 hour PRN Pre/post blood products, medications, blood draw, and to maintain line patency      CAPILLARY BLOOD GLUCOSE        I&O's Summary    29 Jun 2025 07:01  -  30 Jun 2025 07:00  --------------------------------------------------------  IN: 2850 mL / OUT: 7000 mL / NET: -4150 mL    30 Jun 2025 07:01  -  30 Jun 2025 10:49  --------------------------------------------------------  IN: 0 mL / OUT: 0 mL / NET: 0 mL        T(C): 36.6 (06-30-25 @ 09:36), Max: 37.3 (06-29-25 @ 11:00)  HR: 94 (06-30-25 @ 09:36) (73 - 94)  BP: 146/75 (06-30-25 @ 09:36) (123/57 - 167/79)  RR: 18 (06-30-25 @ 09:36) (18 - 34)  SpO2: 96% (06-30-25 @ 09:36) (90% - 96%)    PHYSICAL EXAM:    GENERAL: NAD  NECK: Supple, No JVD  CHEST/LUNG: Clear to auscultation bilaterally; No wheezing.  HEART: Regular rate and rhythm; No murmurs, rubs, or gallops  ABDOMEN: Soft, Nontender, Nondistended; Bowel sounds present  EXTREMITIES:  2+ Peripheral Pulses, No edema  NEUROLOGY: AAOx 3      LABS:                        8.1    7.94  )-----------( 133      ( 30 Jun 2025 07:21 )             25.6     06-30    139  |  106  |  8   ----------------------------<  110[H]  3.8   |  25  |  0.68    Ca    8.5      30 Jun 2025 07:19  Phos  2.5     06-29  Mg     2.0     06-29      PT/INR - ( 29 Jun 2025 06:08 )   PT: 12.3 sec;   INR: 1.08 ratio         PTT - ( 29 Jun 2025 06:08 )  PTT:30.6 sec      Urinalysis Basic - ( 30 Jun 2025 07:19 )    Color: x / Appearance: x / SG: x / pH: x  Gluc: 110 mg/dL / Ketone: x  / Bili: x / Urobili: x   Blood: x / Protein: x / Nitrite: x   Leuk Esterase: x / RBC: x / WBC x   Sq Epi: x / Non Sq Epi: x / Bacteria: x      CAPILLARY BLOOD GLUCOSE            RADIOLOGY & ADDITIONAL TESTS:    Imaging Personally Reviewed:    Consultant(s) Notes Reviewed:      Care Discussed with Consultants/Other Providers:    Thanks for consult. Will follow.
HISTORY OF PRESENT ILLNESS:  83 yo male with CAD s/p coronary stents x5 last 2019, paroxysmal afib s/p ablation 2017,2021, HTn.HLD, polymalgia rheumatica ,loop recorder implant, LY, COPD, anemia requiring several transfusions in May,  followed by lolly Andrew- possible mild MDS from bone marrow biopsy, with initial dose aranesp to be given this week), s/p mitraclip 6/2024 for mitral regurgiation. pt with recent hospitalization [facility] 4/30/25-5/13/25 for left kidney stone, Now s/p left ureteroscopy stent insertion on 6/19/25. Now presents for Left ureteral stent removal and laser enucleation of prostate with morcellation today. SICU c/s post op for phenylephrine requirements.     PAST MEDICAL HISTORY: CAD (coronary artery disease)    HTN (hypertension)    HLD (hyperlipidemia)    Paroxysmal atrial fibrillation    Paroxysmal atrial fibrillation    History of loop recorder    H/O polymyalgia rheumatica    S/P PTCA (percutaneous transluminal coronary angioplasty)    LY (obstructive sleep apnea)    Anemia    History of bone marrow biopsy    MDS (myelodysplastic syndrome), low grade    COPD, mild    BPH (benign prostatic hyperplasia)    Kidney stone        PAST SURGICAL HISTORY: H/O mitral valve repair    S/P total knee replacement, left    History of loop recorder    H/O insertion of nephrostomy tube    S/P cataract surgery        FAMILY HISTORY:     SOCIAL HISTORY:    CODE STATUS:     HOME MEDICATIONS:    ALLERGIES: No Known Allergies      VITAL SIGNS:  ICU Vital Signs Last 24 Hrs  T(C): 36.4 (27 Jun 2025 03:00), Max: 36.7 (27 Jun 2025 02:00)  T(F): 97.5 (27 Jun 2025 03:00), Max: 98.1 (27 Jun 2025 02:00)  HR: 72 (27 Jun 2025 04:00) (56 - 85)  BP: 104/57 (27 Jun 2025 02:45) (61/35 - 165/89)  BP(mean): 71 (27 Jun 2025 02:45) (42 - 88)  ABP: 141/42 (27 Jun 2025 04:00) (124/109 - 160/57)  ABP(mean): 67 (27 Jun 2025 04:00) (67 - 110)  RR: 19 (27 Jun 2025 04:00) (14 - 24)  SpO2: 100% (27 Jun 2025 04:00) (93% - 100%)    O2 Parameters below as of 27 Jun 2025 03:00  Patient On (Oxygen Delivery Method): nasal cannula  O2 Flow (L/min): 2          NEURO  Exam: AOx4  acetaminophen     Tablet .. 975 milliGRAM(s) Oral every 6 hours      RESPIRATORY  Mechanical Ventilation:   ABG - ( 27 Jun 2025 04:04 )  pH: 7.34  /  pCO2: 35    /  pO2: 128   / HCO3: 19    / Base Excess: -6.3  /  SaO2: 99.7    Lactate: x                Exam: lungs CTA b/l      CARDIOVASCULAR  VBG - ( 27 Jun 2025 03:00 )  pH: 7.21  /  pCO2: 46    /  pO2: 30    / HCO3: 18    / Base Excess: -8.8  /  SaO2: 42.8   Lactate: 4.3              Exam: normal S1/S2  Cardiac Rhythm: sinus  phenylephrine    Infusion 0.5 MICROgram(s)/kG/Min IV Continuous <Continuous>      GI/NUTRITION  Exam: abd non distended, non TTP  Diet: NPO  pantoprazole    Tablet 40 milliGRAM(s) Oral before breakfast  senna 2 Tablet(s) Oral at bedtime PRN Constipation      GENITOURINARY/RENAL  calcium gluconate IVPB 2 Gram(s) IV Intermittent once  lactated ringers Bolus 500 milliLiter(s) IV Bolus once  lactated ringers. 1000 milliLiter(s) IV Continuous <Continuous>  magnesium sulfate  IVPB 2 Gram(s) IV Intermittent every 2 hours  potassium chloride    Tablet ER 20 milliEquivalent(s) Oral once  tamsulosin 0.4 milliGRAM(s) Oral at bedtime      06-26 @ 07:01  -  06-27 @ 04:21  --------------------------------------------------------  IN:    IV PiggyBack: 100 mL    Lactated Ringers: 875 mL    Lactated Ringers Bolus: 500 mL    Oral Fluid: 560 mL    Phenylephrine: 106.6 mL    PRBCs (Packed Red Blood Cells): 300 mL  Total IN: 2441.6 mL    OUT:  Total OUT: 0 mL    Total NET: 2441.6 mL        Weight (kg): 91.6 (06-26 @ 14:21)  06-27    142  |  113[H]  |  14  ----------------------------<  175[H]  3.7   |  16[L]  |  0.81    Ca    6.6[L]      27 Jun 2025 03:05  Phos  3.2     06-27  Mg     1.6     06-27      [ ] Rabago catheter, indication: urine output monitoring in critically ill patient    HEMATOLOGIC  [ ] VTE Prophylaxis:                          6.8    9.28  )-----------( 225      ( 27 Jun 2025 03:05 )             22.0     PT/INR - ( 27 Jun 2025 03:05 )   PT: 15.5 sec;   INR: 1.36 ratio         PTT - ( 27 Jun 2025 03:05 )  PTT:30.4 sec  Transfusion: [ ] PRBC	[ ] Platelets	[ ] FFP	[ ] Cryoprecipitate      INFECTIOUS DISEASES  doxycycline monohydrate Capsule 50 milliGRAM(s) Oral daily  fluconAZOLE IVPB 400 milliGRAM(s) IV Intermittent every 24 hours  piperacillin/tazobactam IVPB.. 3.375 Gram(s) IV Intermittent every 8 hours    RECENT CULTURES:      ENDOCRINE  ezetimibe 10 milliGRAM(s) Oral daily  finasteride 5 milliGRAM(s) Oral daily    CAPILLARY BLOOD GLUCOSE          PATIENT CARE ACCESS DEVICES:  [ ] Peripheral IV  [ ] Central Venous Line	[ ] R	[ ] L	[ ] IJ	[ ] Fem	[ ] SC	Placed:   [ ] Arterial Line		[ ] R	[ ] L	[ ] Fem	[ ] Rad	[ ] Ax	Placed:   [ ] PICC:					[ ] Mediport  [ ] Urinary Catheter, Date Placed:   [x] Necessity of urinary, arterial, and venous catheters discussed    OTHER MEDICATIONS: dorzolamide 2%/timolol 0.5% Ophthalmic Solution 1 Drop(s) Both EYES every 12 hours      IMAGING STUDIES:

## 2025-06-30 NOTE — ADVANCED PRACTICE NURSE CONSULT - REASON FOR CONSULT
Consult placed by RN for a R inner buttock DTI, present on admission.    · Reason for Consult	  Consult placed for a R inner buttock DTI, present on admission.    As per the H&P:  History of Present Illness	  83 yo male with CAD s/p coronary stents x5 last 2019, paroxysmal afib s/p ablation 2017,2021, HTn.HLD, polymalgia rheumatica ,loop recorder implant, LY, COPD, anemia requiring several transfusions in May,  followed by lolly Andrew- possible mild MDS from bone marrow biopsy, with initial dose aranesp to be given this week), s/p mitraclip 6/2024 for mitral regurgiation. pt with recent hospitalization [facility] 4/30/25-5/13/25 for left kidney stone, Now s/p left ureteroscopy stent insertion on 6/19/25. Now presents for Left ureteral stent removal and laser enucleation of prostate with morcellation today.     Interval events: s/p Laser enucleation of the prostate, left ureteral stent removal 6/26, with post op bleeding, hypotension requiring ICU admission, 3U PRBCs, 1U FFP; s/p cysto, clot evacuation, fulguration 6/27  
Consult placed for a R inner buttock DTI, present on admission.    As per the H&P:  History of Present Illness	  85 yo male with CAD s/p coronary stents x5 last 2019, paroxysmal afib s/p ablation 2017,2021, HTn.HLD, polymalgia rheumatica ,loop recorder implant, LY, COPD, anemia requiring several transfusions in May,  followed by lolly Andrew- possible mild MDS from bone marrow biopsy, with initial dose aranesp to be given this week), s/p mitraclip 6/2024 for mitral regurgiation. pt with recent hospitalization [facility] 4/30/25-5/13/25 for left kidney stone, Now s/p left ureteroscopy stent insertion on 6/19/25. Now presents for Left ureteral stent removal and laser enucleation of prostate with morcellation today.

## 2025-06-30 NOTE — ADVANCED PRACTICE NURSE CONSULT - ASSESSMENT
Met with the patient on 9MON. Patient is awake, lying on a low air-loss pressure redistribution surface upon arrival. He is alert and consented to a skin consult after introducing self and role. Offloading and pericare initiated upon admission as patient is increasingly sedentary 2/2 to illness. +Rabago.     With assistance from the RN, patient turned to his left side for an assessment of his sacrococcygeal region. Upon assessment, there is a 9cm x 2.5cm area of diffuse, purple discoloration noted within the R inner buttock. Additionally, there is scrotal ecchymosis present. There is no increased warmth, induration, or fluctuance noted. Patient denies tenderness. Etiology likely related to complications of laser enucleation and bleeding. However, cannot r/o deep tissue injury. Painted with Cavilon.
Patient not in the room at time of consult. As per the RN, patient left for the OR. Unable to assess at this time.

## 2025-06-30 NOTE — PROGRESS NOTE ADULT - ASSESSMENT
A/P: 84y Male s/p Laser enucleation of the prostate, left ureteral stent removal 6/26, with post op bleeding, hypotension requiring ICU admission, 3U PRBCs, 1U FFP; s/p cysto, clot evacuation, fulguration 6/27    -cbc, bmp  -cont zosyn  -cont diflucan  -SOB  -consider diuresis  -discharge planning (will go home with weiss in place)

## 2025-06-30 NOTE — ADVANCED PRACTICE NURSE CONSULT - RECOMMEDATIONS
R inner buttock DTI  - Paint with Cavilon daily or Adolfo Protect BID/PRN soiling. Switch to Triad if the tissue is no longer intact.    Preventative  - Continue w/ low air loss pressure redistribution bed surface.  - Moisturize skin with Sween24 daily after bathing.  - Continue turning and positioning q2h and utilize offloading devices as per protocol (i.e. Seth-Lock for heels while in bed).  - Avoid use of diapers and continue with only one breathable underpad while in bed.  - Utilize external catheters if patient unable to toilet OOB.  - Routine lexie-care with BID/PRN protective barrier cream (i.e. Adolfo or Critic-Aid Clear) for soiling.  - Waffle cushion if OOB to chair.  - Nutrition Consult for optimization in pt w/ increased nutritional needs.  - Encourage high quality protein, Efrain/Prosource, MVI & Vit C to promote wound healing.    For questions/comments regarding the recommendations in this consult, please contact Rosemary Olivier via [employer] Teams. Wound care will not actively follow. For new concerns, please enter new consult. Thank you!
Wound Care Team to f/u.

## 2025-06-30 NOTE — PROGRESS NOTE ADULT - SUBJECTIVE AND OBJECTIVE BOX
UROLOGY PROGRESS NOTE:     Subjective: Patient seen and examined at bedside. No major events overnight. Pt c/o some SOB this am. denies any chest pain      Objective:  Vital signs  T(F): , Max: 99.1 (06-29-25 @ 11:00)  HR: 83 (06-30-25 @ 05:25)  BP: 167/79 (06-30-25 @ 05:25)  SpO2: 92% (06-30-25 @ 05:25)  Wt(kg): --    Output     I&O's Detail    29 Jun 2025 07:01  -  30 Jun 2025 07:00  --------------------------------------------------------  IN:    IV PiggyBack: 200 mL    IV PiggyBack: 225 mL    IV PiggyBack: 450 mL    Lactated Ringers: 975 mL    Oral Fluid: 1000 mL  Total IN: 2850 mL    OUT:    Indwelling Catheter - Urethral (mL): 7000 mL  Total OUT: 7000 mL    Total NET: -4150 mL          Physical Exam:  Gen: no acute distress  Back: no CVAT b/l  Abd: soft nt nd  : 3way weiss in place. CBI off. urine clear    Labs:                        7.4    8.36  )-----------( 102      ( 28 Jun 2025 18:17 )             21.9     06-29    138  |  106  |  9   ----------------------------<  104[H]  3.6   |  22  |  0.72    Ca    8.0[L]      29 Jun 2025 11:15  Phos  2.5     06-29  Mg     2.0     06-29      PT/INR - ( 29 Jun 2025 06:08 )   PT: 12.3 sec;   INR: 1.08 ratio         PTT - ( 29 Jun 2025 06:08 )  PTT:30.6 sec

## 2025-06-30 NOTE — CONSULT NOTE ADULT - ASSESSMENT
ASSESSMENT: 84 yom admitted to SICU for hemorrhage and phenylephrine requirements s/p laser ablation of prostate, on CBI.     PLAN:    Neuro:  - pain control w/ tylenol    Resp:  - Out of bed to chair, ambulate as tolerated, and incentive spirometry to prevent atelectasis  - nasal cannula as needed    CV:  - goal MAP > 65 mmHg  - wean juana as tolerated with transfusions  - 1.5L crystalloid  - lactate 4.3 > 3.9    GI:   - Diet: NPO  - Protonix for stress ulcer prophylaxis    Renal:  - Monitor I&Os and electrolytes w/ repletions as necessary  - CBI  - high clot burden in bladder, s/p weiss swap at bedside  - proscar    Heme:  - Monitor CBC and coags  - s/p 3 PRBC, 1 FFP for urologic hemorrhage  - TEG sent  - SCDs for VTE prophylaxis    ID:   - Monitor for clinical evidence of active infection  - Monitor WBC, temperature, and procalcitonin  - Empiric antibiotics zosyn, fluc    Endo:   - Monitor glucose    Code Status: full    Disposition: SICU    The above case discussed with SICU attending Estevan Taylor MD.    Drake Underwood PA-C  Surgical Intensive Care Unit  [id number]
· Assessment	  A/P: 84y Male s/p Laser enucleation of the prostate, left ureteral stent removal 6/26, with post op bleeding, hypotension requiring ICU admission, 3U PRBCs, 1U FFP; s/p cysto, clot evacuation, fulguration 6/27.    Uro f/up appreciated  Abx as per uro- IV Zosyn/Fluconazole  On supp O2- CXR ordered.  CXR from 6/28- Bl Effusions/Atelectasis  Will give one dose of IV Lasix 20 mg  Cw Po Lasix 40 bid

## 2025-07-01 ENCOUNTER — TRANSCRIPTION ENCOUNTER (OUTPATIENT)
Age: 85
End: 2025-07-01

## 2025-07-01 DIAGNOSIS — N20.0 CALCULUS OF KIDNEY: ICD-10-CM

## 2025-07-01 LAB
ANION GAP SERPL CALC-SCNC: 15 MMOL/L — SIGNIFICANT CHANGE UP (ref 5–17)
ANION GAP SERPL CALC-SCNC: 15 MMOL/L — SIGNIFICANT CHANGE UP (ref 5–17)
BUN SERPL-MCNC: 14 MG/DL — SIGNIFICANT CHANGE UP (ref 7–23)
BUN SERPL-MCNC: 7 MG/DL — SIGNIFICANT CHANGE UP (ref 7–23)
CALCIUM SERPL-MCNC: 9 MG/DL — SIGNIFICANT CHANGE UP (ref 8.4–10.5)
CALCIUM SERPL-MCNC: 9.6 MG/DL — SIGNIFICANT CHANGE UP (ref 8.4–10.5)
CHLORIDE SERPL-SCNC: 92 MMOL/L — LOW (ref 96–108)
CHLORIDE SERPL-SCNC: 97 MMOL/L — SIGNIFICANT CHANGE UP (ref 96–108)
CO2 SERPL-SCNC: 27 MMOL/L — SIGNIFICANT CHANGE UP (ref 22–31)
CO2 SERPL-SCNC: 27 MMOL/L — SIGNIFICANT CHANGE UP (ref 22–31)
CREAT SERPL-MCNC: 0.76 MG/DL — SIGNIFICANT CHANGE UP (ref 0.5–1.3)
CREAT SERPL-MCNC: 0.97 MG/DL — SIGNIFICANT CHANGE UP (ref 0.5–1.3)
EGFR: 77 ML/MIN/1.73M2 — SIGNIFICANT CHANGE UP
EGFR: 77 ML/MIN/1.73M2 — SIGNIFICANT CHANGE UP
EGFR: 89 ML/MIN/1.73M2 — SIGNIFICANT CHANGE UP
EGFR: 89 ML/MIN/1.73M2 — SIGNIFICANT CHANGE UP
GLUCOSE SERPL-MCNC: 127 MG/DL — HIGH (ref 70–99)
GLUCOSE SERPL-MCNC: 98 MG/DL — SIGNIFICANT CHANGE UP (ref 70–99)
HCT VFR BLD CALC: 26.6 % — LOW (ref 39–50)
HCT VFR BLD CALC: 27.1 % — LOW (ref 39–50)
HGB BLD-MCNC: 8.7 G/DL — LOW (ref 13–17)
HGB BLD-MCNC: 8.9 G/DL — LOW (ref 13–17)
MCHC RBC-ENTMCNC: 29.6 PG — SIGNIFICANT CHANGE UP (ref 27–34)
MCHC RBC-ENTMCNC: 29.8 PG — SIGNIFICANT CHANGE UP (ref 27–34)
MCHC RBC-ENTMCNC: 32.7 G/DL — SIGNIFICANT CHANGE UP (ref 32–36)
MCHC RBC-ENTMCNC: 32.8 G/DL — SIGNIFICANT CHANGE UP (ref 32–36)
MCV RBC AUTO: 90.5 FL — SIGNIFICANT CHANGE UP (ref 80–100)
MCV RBC AUTO: 90.6 FL — SIGNIFICANT CHANGE UP (ref 80–100)
NRBC # BLD AUTO: 0 K/UL — SIGNIFICANT CHANGE UP (ref 0–0)
NRBC # BLD AUTO: 0 K/UL — SIGNIFICANT CHANGE UP (ref 0–0)
NRBC # FLD: 0 K/UL — SIGNIFICANT CHANGE UP (ref 0–0)
NRBC # FLD: 0 K/UL — SIGNIFICANT CHANGE UP (ref 0–0)
NRBC BLD AUTO-RTO: 0 /100 WBCS — SIGNIFICANT CHANGE UP (ref 0–0)
NRBC BLD AUTO-RTO: 0 /100 WBCS — SIGNIFICANT CHANGE UP (ref 0–0)
PLATELET # BLD AUTO: 163 K/UL — SIGNIFICANT CHANGE UP (ref 150–400)
PLATELET # BLD AUTO: 181 K/UL — SIGNIFICANT CHANGE UP (ref 150–400)
PMV BLD: 9.3 FL — SIGNIFICANT CHANGE UP (ref 7–13)
PMV BLD: 9.3 FL — SIGNIFICANT CHANGE UP (ref 7–13)
POTASSIUM SERPL-MCNC: 3 MMOL/L — LOW (ref 3.5–5.3)
POTASSIUM SERPL-MCNC: 3.3 MMOL/L — LOW (ref 3.5–5.3)
POTASSIUM SERPL-SCNC: 3 MMOL/L — LOW (ref 3.5–5.3)
POTASSIUM SERPL-SCNC: 3.3 MMOL/L — LOW (ref 3.5–5.3)
RBC # BLD: 2.94 M/UL — LOW (ref 4.2–5.8)
RBC # BLD: 2.99 M/UL — LOW (ref 4.2–5.8)
RBC # FLD: 17.9 % — HIGH (ref 10.3–14.5)
RBC # FLD: 18 % — HIGH (ref 10.3–14.5)
SODIUM SERPL-SCNC: 134 MMOL/L — LOW (ref 135–145)
SODIUM SERPL-SCNC: 139 MMOL/L — SIGNIFICANT CHANGE UP (ref 135–145)
WBC # BLD: 6.67 K/UL — SIGNIFICANT CHANGE UP (ref 3.8–10.5)
WBC # BLD: 9.64 K/UL — SIGNIFICANT CHANGE UP (ref 3.8–10.5)
WBC # FLD AUTO: 6.67 K/UL — SIGNIFICANT CHANGE UP (ref 3.8–10.5)
WBC # FLD AUTO: 9.64 K/UL — SIGNIFICANT CHANGE UP (ref 3.8–10.5)

## 2025-07-01 PROCEDURE — 71045 X-RAY EXAM CHEST 1 VIEW: CPT | Mod: 26

## 2025-07-01 PROCEDURE — 93010 ELECTROCARDIOGRAM REPORT: CPT

## 2025-07-01 RX ORDER — APIXABAN 2.5 MG/1
1 TABLET, FILM COATED ORAL
Qty: 0 | Refills: 0
Start: 2025-07-01

## 2025-07-01 RX ORDER — SENNA 187 MG
2 TABLET ORAL
Qty: 0 | Refills: 0 | DISCHARGE
Start: 2025-07-01

## 2025-07-01 RX ADMIN — ENOXAPARIN SODIUM 40 MILLIGRAM(S): 100 INJECTION SUBCUTANEOUS at 10:32

## 2025-07-01 RX ADMIN — DORZOLAMIDE HYDROCHLORIDE AND TIMOLOL MALEATE 1 DROP(S): 20; 5 SOLUTION/ DROPS OPHTHALMIC at 06:13

## 2025-07-01 RX ADMIN — Medication 40 MILLIEQUIVALENT(S): at 16:10

## 2025-07-01 RX ADMIN — Medication 50 MILLIEQUIVALENT(S): at 10:32

## 2025-07-01 RX ADMIN — FUROSEMIDE 40 MILLIGRAM(S): 10 INJECTION INTRAMUSCULAR; INTRAVENOUS at 14:35

## 2025-07-01 RX ADMIN — Medication 50 MILLIEQUIVALENT(S): at 12:28

## 2025-07-01 RX ADMIN — Medication 1 TABLET(S): at 12:35

## 2025-07-01 RX ADMIN — FINASTERIDE 5 MILLIGRAM(S): 1 TABLET, FILM COATED ORAL at 12:34

## 2025-07-01 RX ADMIN — TAMSULOSIN HYDROCHLORIDE 0.4 MILLIGRAM(S): 0.4 CAPSULE ORAL at 23:05

## 2025-07-01 RX ADMIN — FUROSEMIDE 40 MILLIGRAM(S): 10 INJECTION INTRAMUSCULAR; INTRAVENOUS at 05:51

## 2025-07-01 RX ADMIN — Medication 25 GRAM(S): at 22:59

## 2025-07-01 RX ADMIN — AMLODIPINE BESYLATE 2.5 MILLIGRAM(S): 10 TABLET ORAL at 05:51

## 2025-07-01 RX ADMIN — Medication 5 MILLILITER(S): at 05:50

## 2025-07-01 RX ADMIN — OXYBUTYNIN CHLORIDE 5 MILLIGRAM(S): 5 TABLET, FILM COATED, EXTENDED RELEASE ORAL at 23:04

## 2025-07-01 RX ADMIN — OXYBUTYNIN CHLORIDE 5 MILLIGRAM(S): 5 TABLET, FILM COATED, EXTENDED RELEASE ORAL at 12:34

## 2025-07-01 RX ADMIN — Medication 40 MILLIEQUIVALENT(S): at 23:05

## 2025-07-01 RX ADMIN — Medication 25 GRAM(S): at 05:51

## 2025-07-01 RX ADMIN — POLYETHYLENE GLYCOL 3350 17 GRAM(S): 17 POWDER, FOR SOLUTION ORAL at 12:34

## 2025-07-01 RX ADMIN — Medication 50 MILLIEQUIVALENT(S): at 14:35

## 2025-07-01 RX ADMIN — OXYBUTYNIN CHLORIDE 5 MILLIGRAM(S): 5 TABLET, FILM COATED, EXTENDED RELEASE ORAL at 05:50

## 2025-07-01 RX ADMIN — EZETIMIBE 10 MILLIGRAM(S): 10 TABLET ORAL at 12:35

## 2025-07-01 RX ADMIN — Medication 40 MILLIGRAM(S): at 05:50

## 2025-07-01 RX ADMIN — Medication 100 MILLIGRAM(S): at 05:50

## 2025-07-01 RX ADMIN — Medication 1 APPLICATION(S): at 05:51

## 2025-07-01 NOTE — DISCHARGE NOTE PROVIDER - NSDCFUADDINST_GEN_ALL_CORE_FT
CATHETER: Some patients are sent home with a weiss catheter, while others go home urinating on their own. If you still have a catheter, the nurses will review instructions and care before you go home. For men, you may have a prescription for lidocaine jelly to apply to the tip of your penis, as needed, for catheter related discomfort.  GENERAL: It is common to have blood in your urine after your procedure. It may be pink or even red; inform your doctor if you have a significant amount of clot in the urine or if you are unable to void at all or if your catheter stops draining. The urine may clear and then become bloody again especially as you are more physically active. It is not uncommon to have some burning when you urinate, this will gradually improve. With a catheter in place, it is not uncommon to have occasional leakage or urine or blood around the catheter. Please call your urologist if this is excessive and/or the urine is not draining through the catheter into the bag.  BATHING: You may shower or bathe. If going home with weiss, shower only until catheter is removed.  PAIN: You may take Tylenol (acetaminophen) 650-975mg  available over the counter, for pain every 6 hours as needed. Do not exceed 4000mg of Tylenol (acetaminophen) daily.     STOOL SOFTENERS: Do not allow yourself to become constipated as straining may cause bleeding. Take stool softeners or a laxative (ex. Miralax, Colace, Senokot, ExLax, etc), available over the counter, if needed.  ACTIVITY: No heavy lifting or strenuous exercise until you are evaluated at your post-operative appointment. Otherwise, you may return to your usual level of physical activity.  ANTICOAGULATION: HOLD Eliquis until  your urologist prior to restarting these medications unless otherwise specified.  FOLLOW-UP: If you did not already schedule your post-operative appointment, please call your urologist to schedule and follow-up appointment.  CALL YOUR UROLOGIST IF: You have any bleeding that does not stop, inability to void >8 hours, fever over 100.4 F, chills, persistent nausea/vomiting, changes in your incision concerning for infection, or if your pain is not controlled on your discharge pain medications.

## 2025-07-01 NOTE — DISCHARGE NOTE PROVIDER - NSDCMRMEDTOKEN_GEN_ALL_CORE_FT
AMLODIPINE BESYLATE 2.5 MG Tablet: TAKE ONE TABLET DAILY  aranesp:   aspirin 81 mg oral capsule: 1 cap(s) orally once a day  DORZOLAMIDE HCL/TIMOLOL MALEATE 22.3 MG/ML-6.8 MG/ML Solution: ONE DROP IN EACH EYE TWICE A DAY  DOXYCYCLINE MONOHYDRATE 50 MG Capsule: TAKE ONE CAPSULE DAILY  EZETIMIBE 10 MG Tablet: TAKE ONE TABLET DAILY  FINASTERIDE 5 MG Tablet: TAKE ONE TABLET DAILY  furosemide 40 mg oral tablet: 1 tab(s) orally 2 times a day  omeprazole 40 mg oral delayed release capsule: 1 cap(s) orally  oxyBUTYnin 5 mg oral tablet: 1 tab(s) orally 2 times a day  PRAVASTATIN SODIUM 40 MG Tablet: TAKE ONE TABLET BY MOUTH DAILY  TAMSULOSIN HYDROCHLORIDE 0.4 MG Capsule: TAKE ONE CAPSULE TWICE A DAY  TIMOLOL MALEATE 0.25 % Solution: PUT ONE DROP IN EACH EYE TWICE A DAY   AMLODIPINE BESYLATE 2.5 MG Tablet: TAKE ONE TABLET DAILY  aranesp:   aspirin 81 mg oral capsule: 1 cap(s) orally once a day  DORZOLAMIDE HCL/TIMOLOL MALEATE 22.3 MG/ML-6.8 MG/ML Solution: ONE DROP IN EACH EYE TWICE A DAY  DOXYCYCLINE MONOHYDRATE 50 MG Capsule: TAKE ONE CAPSULE DAILY  EZETIMIBE 10 MG Tablet: TAKE ONE TABLET DAILY  FINASTERIDE 5 MG Tablet: TAKE ONE TABLET DAILY  furosemide 40 mg oral tablet: 1 tab(s) orally 2 times a day  omeprazole 40 mg oral delayed release capsule: 1 cap(s) orally  oxyBUTYnin 5 mg oral tablet: 1 tab(s) orally 2 times a day  PRAVASTATIN SODIUM 40 MG Tablet: TAKE ONE TABLET BY MOUTH DAILY  senna leaf extract oral tablet: 2 tab(s) orally once a day (at bedtime) As needed Constipation  TAMSULOSIN HYDROCHLORIDE 0.4 MG Capsule: TAKE ONE CAPSULE TWICE A DAY  TIMOLOL MALEATE 0.25 % Solution: PUT ONE DROP IN EACH EYE TWICE A DAY

## 2025-07-01 NOTE — DISCHARGE NOTE PROVIDER - HOSPITAL COURSE
83 yo underwent Laser enucleation of the prostate, left ureteral stent removal, with ?vasovagal episode and hypotension requiring pressors in PACU, Patient went to SICU and transfusion administered patient continued with bleeding and was taken back to OR underwent cysto clot evacuation and fulguration. 6/27       83 yo underwent Laser enucleation of the prostate, left ureteral stent removal, with ?vasovagal episode and hypotension requiring pressors in PACU, Patient went to SICU and transfusion administered patient continued with bleeding and was taken back to OR underwent cysto clot evacuation and fulguration. 6/27. Patient recieved diflucan and zosyn during hospitalization due to U cx candidia Patient was on CBi and bleeding resolved after RTOR. Pressor no longer needed and care descalated from ICU to floor. CBI held and color remained clear. Patient has been diuresising resuscitation fluid with the addition of lasix as well. Urine crystal clear. H/h stable, Potasium only electrolyte abnormailty and repleted. Upon discharge patient feels well draining clear urine, pain free tolerating diet and afebrile. Patient had OT and PT eval with rec of home PT. Patient to remain off eliquis until followup next week with Dr Stevens          85 yo underwent Laser enucleation of the prostate, left ureteral stent removal, with ?vasovagal episode and hypotension requiring pressors in PACU, Patient went to SICU and transfusion administered patient continued with bleeding and was taken back to OR underwent cysto clot evacuation and fulguration. 6/27. Patient recieved diflucan and zosyn during hospitalization due to U cx candidia Patient was on CBi and bleeding resolved after RTOR. Pressor no longer needed and care descalated from ICU to floor. CBI held and color remained clear. Patient has been diuresising resuscitation fluid with the addition of lasix as well. Urine crystal clear. H/h stable, Potasium only electrolyte abnormailty and repleted. Upon discharge patient feels well draining clear urine, pain free tolerating diet and afebrile. Patient had OT and PT eval with rec of home PT. Patient to remain off eliquis until followup next week with Dr Stevens.  Dc-ed with isela.       85 yo underwent Laser enucleation of the prostate, left ureteral stent removal, with ?vasovagal episode and hypotension requiring pressors in PACU, Patient went to SICU and transfusion administered patient continued with bleeding and was taken back to OR underwent cysto clot evacuation and fulguration. 6/27. Patient recieved diflucan and zosyn during hospitalization due to U cx candidia Patient was on CBI and bleeding resolved after RTOR. Pressor no longer needed and care descalated from ICU to floor. CBI held and color remained clear. Patient has been diuresing resuscitation fluid with the addition of lasix as well. Urine crystal clear. H/h stable, Potasium only electrolyte abnormailty and repleted. Upon discharge patient feels well draining clear urine, pain free tolerating diet and afebrile. Patient had OT and PT eval with rec of home PT. Patient to remain off eliquis until followup next week with Dr Stevens.  Dc-ed with isela.       ADDENDUM  7/20/25 10:19PM  Patient's bleeding   Postoperative Hemorrhage  in this patient who was on Eliquis. Despite being stopped before surgery, it is very likely that the post operative bleeding noted was contributed to by the lingering effects of the patients home anticoagulation on Eliquis. This is bolstered by the fact that no discrete arterial bleed was noted on return to operating room. This is further supported by the fact that his prior hospitalizations at [facility] and [facility] was marked by similar episodes of post operative bleeding.

## 2025-07-01 NOTE — DISCHARGE NOTE NURSING/CASE MANAGEMENT/SOCIAL WORK - NSDCPEFALRISK_GEN_ALL_CORE
For information on Fall & Injury Prevention, visit: [email or web address] OR  [email or web address] OR  https://www.cdc.gov/steadi/patient.html

## 2025-07-01 NOTE — PROGRESS NOTE ADULT - ASSESSMENT
· Assessment	  A/P: 84y Male s/p Laser enucleation of the prostate, left ureteral stent removal 6/26, with post op bleeding, hypotension requiring ICU admission, 3U PRBCs, 1U FFP; s/p cysto, clot evacuation, fulguration 6/27.    Uro f/up appreciated  Abx as per uro- IV Zosyn/Fluconazole  On supp O2- CXR ordered.  CXR from 6/28- Bl Effusions/Atelectasis  Will give one dose of IV Lasix 20 mg  Cw Po Lasix 40 bid     D/C planning

## 2025-07-01 NOTE — DISCHARGE NOTE PROVIDER - NSDCCPTREATMENT_GEN_ALL_CORE_FT
PRINCIPAL PROCEDURE  Procedure: Cystoscopy, with prostate enucleation using laser  Findings and Treatment: hold eliquis until seen and Dr Stevens says ok to restart avoid heavy lifting x 10lbs, drink to thirst, call office ER bleeding returns, unable to void or fever >101      SECONDARY PROCEDURE  Procedure: Cystoscopy, with bladder neck fulguration  Findings and Treatment: see primary procdure instructions

## 2025-07-01 NOTE — DISCHARGE NOTE PROVIDER - NSDCCPCAREPLAN_GEN_ALL_CORE_FT
PRINCIPAL DISCHARGE DIAGNOSIS  Diagnosis: BPH (benign prostatic hyperplasia)  Assessment and Plan of Treatment: see special instructions      SECONDARY DISCHARGE DIAGNOSES  Diagnosis: Anemia  Assessment and Plan of Treatment:      PRINCIPAL DISCHARGE DIAGNOSIS  Diagnosis: BPH (benign prostatic hyperplasia)  Assessment and Plan of Treatment: see special instructions      SECONDARY DISCHARGE DIAGNOSES  Diagnosis: LY (obstructive sleep apnea)  Assessment and Plan of Treatment: If you have a bipap or cpap, continue using it and follow up routinely with your pulmonologist    Diagnosis: Paroxysmal atrial fibrillation  Assessment and Plan of Treatment: Do not restart eliquis until you see Dr Stevens.  Follow up with your cardiologist routinely    Diagnosis: CAD (coronary artery disease)  Assessment and Plan of Treatment: Follow up routinely with your cardiologist    Diagnosis: HTN (hypertension)  Assessment and Plan of Treatment: Take your blood pressure medications daily and follow up routinely with your primary care doctor.      Diagnosis: HLD (hyperlipidemia)  Assessment and Plan of Treatment: Continue taking your cholesterol medications and follow up routinely with your primary care doctor.       PRINCIPAL DISCHARGE DIAGNOSIS  Diagnosis: BPH (benign prostatic hyperplasia)  Assessment and Plan of Treatment: see special instructions.  Additionally, your left ureteral stent was removed.      SECONDARY DISCHARGE DIAGNOSES  Diagnosis: LY (obstructive sleep apnea)  Assessment and Plan of Treatment: If you have a bipap or cpap, continue using it and follow up routinely with your pulmonologist    Diagnosis: Paroxysmal atrial fibrillation  Assessment and Plan of Treatment: Do not restart eliquis until you see Dr Stevens.  Follow up with your cardiologist routinely    Diagnosis: CAD (coronary artery disease)  Assessment and Plan of Treatment: Follow up routinely with your cardiologist    Diagnosis: HTN (hypertension)  Assessment and Plan of Treatment: Take your blood pressure medications daily and follow up routinely with your primary care doctor.      Diagnosis: HLD (hyperlipidemia)  Assessment and Plan of Treatment: Continue taking your cholesterol medications and follow up routinely with your primary care doctor.

## 2025-07-01 NOTE — DISCHARGE NOTE NURSING/CASE MANAGEMENT/SOCIAL WORK - PATIENT PORTAL LINK FT
You can access the FollowMyHealth Patient Portal offered by [facility] by registering at the following website: [email or web address]. By joining [employer]’s FollowMyHealth portal, you will also be able to view your health information using other applications (apps) compatible with our system.

## 2025-07-01 NOTE — DISCHARGE NOTE NURSING/CASE MANAGEMENT/SOCIAL WORK - FINANCIAL ASSISTANCE
[facility] provides services at a reduced cost to those who are determined to be eligible through [facility]’s financial assistance program. Information regarding [facility]’s financial assistance program can be found by going to [email or web address] or by calling [phone].

## 2025-07-01 NOTE — DISCHARGE NOTE PROVIDER - CARE PROVIDER_API CALL
Gerardo Stevens  Urology  [address] [place] [address], [address] [address]  Phone: [phone]  Fax: [phone]  Established Patient  Follow Up Time: 1 week

## 2025-07-01 NOTE — PROGRESS NOTE ADULT - ASSESSMENT
A/P: 84y Male s/p Laser enucleation of the prostate, left ureteral stent removal 6/26, with post op bleeding, hypotension requiring ICU admission, 3U PRBCs, 1U FFP; s/p cysto, clot evacuation, fulguration 6/27    -cbc, bmp  -cont zosyn  -cont diflucan  -f/u CXR  -f/u med  -cont to hold eliquis  -discharge planning (will go home with weiss in place)

## 2025-07-01 NOTE — PROGRESS NOTE ADULT - SUBJECTIVE AND OBJECTIVE BOX
Patient is a 84y old  Male s/p prostate ablation (29 Jun 2025 01:24)      HPI:  85 yo male with CAD s/p coronary stents x5 last 2019, paroxysmal afib s/p ablation 2017,2021, HTn.HLD, polymalgia rheumatica ,loop recorder implant, LY, COPD, anemia requiring several transfusions in May,  followed by lolly Andrew- possible mild MDS from bone marrow biopsy, with initial dose aranesp to be given this week), s/p mitraclip 6/2024 for mitral regurgiation. pt with recent hospitalization [facility] 4/30/25-5/13/25 for left kidney stone, Now s/p left ureteroscopy stent insertion on 6/19/25. S/p Left ureteral stent removal and laser enucleation of prostate with morcellation.  Post op SOB on supp O2.      PAST MEDICAL & SURGICAL HISTORY:  CAD (coronary artery disease)      HTN (hypertension)      HLD (hyperlipidemia)      Paroxysmal atrial fibrillation      History of loop recorder      H/O polymyalgia rheumatica      S/P PTCA (percutaneous transluminal coronary angioplasty)      LY (obstructive sleep apnea)      Anemia      History of bone marrow biopsy      MDS (myelodysplastic syndrome), low grade      COPD, mild      BPH (benign prostatic hyperplasia)      Kidney stone      H/O mitral valve repair      S/P total knee replacement, left      History of loop recorder      H/O insertion of nephrostomy tube      S/P cataract surgery          Review of Systems:   CONSTITUTIONAL: No fever,  or fatigue  NECK: No pain or stiffness  RESPIRATORY: Cough, Supp O2  CARDIOVASCULAR: No chest pain, palpitations  GASTROINTESTINAL: No abdominal  pain. No nausea, vomiting.  NEUROLOGICAL: No headaches,           Allergies    No Known Allergies    Intolerances        Social History:     FAMILY HISTORY:      MEDICATIONS  (STANDING):  amLODIPine   Tablet 2.5 milliGRAM(s) Oral daily  Biotene Dry Mouth Oral Rinse 5 milliLiter(s) Swish and Spit two times a day  chlorhexidine 2% Cloths 1 Application(s) Topical <User Schedule>  dorzolamide 2%/timolol 0.5% Ophthalmic Solution 1 Drop(s) Both EYES every 12 hours  enoxaparin Injectable 40 milliGRAM(s) SubCutaneous every 24 hours  ezetimibe 10 milliGRAM(s) Oral daily  finasteride 5 milliGRAM(s) Oral daily  fluconAZOLE IVPB 400 milliGRAM(s) IV Intermittent every 24 hours  furosemide    Tablet 40 milliGRAM(s) Oral two times a day  lactated ringers. 1000 milliLiter(s) (75 mL/Hr) IV Continuous <Continuous>  oxybutynin 5 milliGRAM(s) Oral three times a day  pantoprazole    Tablet 40 milliGRAM(s) Oral before breakfast  piperacillin/tazobactam IVPB.. 3.375 Gram(s) IV Intermittent every 8 hours  polyethylene glycol 3350 17 Gram(s) Oral daily  senna 1 Tablet(s) Oral daily  tamsulosin 0.4 milliGRAM(s) Oral at bedtime    MEDICATIONS  (PRN):  benzocaine/menthol Lozenge 1 Lozenge Oral every 6 hours PRN Sore Throat  senna 2 Tablet(s) Oral at bedtime PRN Constipation  sodium chloride 0.9% lock flush 10 milliLiter(s) IV Push every 1 hour PRN Pre/post blood products, medications, blood draw, and to maintain line patency      CAPILLARY BLOOD GLUCOSE        I&O's Summary    29 Jun 2025 07:01  -  30 Jun 2025 07:00  --------------------------------------------------------  IN: 2850 mL / OUT: 7000 mL / NET: -4150 mL    30 Jun 2025 07:01  -  30 Jun 2025 10:49  --------------------------------------------------------  IN: 0 mL / OUT: 0 mL / NET: 0 mL        T(C): 36.6 (06-30-25 @ 09:36), Max: 37.3 (06-29-25 @ 11:00)  HR: 94 (06-30-25 @ 09:36) (73 - 94)  BP: 146/75 (06-30-25 @ 09:36) (123/57 - 167/79)  RR: 18 (06-30-25 @ 09:36) (18 - 34)  SpO2: 96% (06-30-25 @ 09:36) (90% - 96%)    PHYSICAL EXAM:    GENERAL: NAD  NECK: Supple, No JVD  CHEST/LUNG: Clear to auscultation bilaterally; No wheezing.  HEART: Regular rate and rhythm; No murmurs, rubs, or gallops  ABDOMEN: Soft, Nontender, Nondistended; Bowel sounds present  EXTREMITIES:  2+ Peripheral Pulses, No edema  NEUROLOGY: AAOx 3      LABS:                        8.1    7.94  )-----------( 133      ( 30 Jun 2025 07:21 )             25.6     06-30    139  |  106  |  8   ----------------------------<  110[H]  3.8   |  25  |  0.68    Ca    8.5      30 Jun 2025 07:19  Phos  2.5     06-29  Mg     2.0     06-29      PT/INR - ( 29 Jun 2025 06:08 )   PT: 12.3 sec;   INR: 1.08 ratio         PTT - ( 29 Jun 2025 06:08 )  PTT:30.6 sec      Urinalysis Basic - ( 30 Jun 2025 07:19 )    Color: x / Appearance: x / SG: x / pH: x  Gluc: 110 mg/dL / Ketone: x  / Bili: x / Urobili: x   Blood: x / Protein: x / Nitrite: x   Leuk Esterase: x / RBC: x / WBC x   Sq Epi: x / Non Sq Epi: x / Bacteria: x      CAPILLARY BLOOD GLUCOSE            RADIOLOGY & ADDITIONAL TESTS:    Imaging Personally Reviewed:    Consultant(s) Notes Reviewed:      Care Discussed with Consultants/Other Providers:    Thanks for consult. Will follow.

## 2025-07-01 NOTE — DISCHARGE NOTE PROVIDER - NSDCFUSCHEDAPPT_GEN_ALL_CORE_FT
[facility]  UROLOGY [address] [place] [address] R  Scheduled Appointment: 07/02/2025    Gerardo Stevens  [facility]  UROLOGY [address] [place] [address] R  Scheduled Appointment: 07/02/2025     [facility]  UROLOGY [address] [place] [address]  Scheduled Appointment: 08/25/2025    [facility]  UROLOGY [address] [place] [address]  Scheduled Appointment: 08/25/2025     Gerardo Stevens  [facility]  UROLOGY [address] [place] [address] R  Scheduled Appointment: 08/13/2025    [facility]  UROLOGY [address] [place] [address] R  Scheduled Appointment: 10/08/2025    Gerardo Stevens  [facility]  UROLOGY [address] [place] [address] R  Scheduled Appointment: 10/08/2025

## 2025-07-01 NOTE — DISCHARGE NOTE PROVIDER - NSDCQMAMI_CARD_ALL_CORE
FINAL REPORT



EXAM:  US ABDOMEN LIMITED



HISTORY:  for gall bladder 



COMPARISONS:  None



FINDINGS:  

Grayscale and color Doppler ultrasound evaluation of the right upper abdomen 



Liver is normal in size and contour. Hepatic parenchymal echogenicity is within normal limits. No par
enchymal lesion identified. No intra or extrahepatic biliary ductal dilatation. The common duct measu
res approximately 4 millimeters in caliber. 



The gallbladder is incompletely distended. Gallbladder wall thickness measures 3-4 millimeters. No pe
richolecystic edema. Subcentimeter echogenic shadowing gallstones and sludge are present in the gallb
ladder neck. 



Imaged portion of the pancreatic head is sonographically unremarkable. The remainder of the pancreas 
is not well seen secondary to overlying bowel gas. 



No abdominal ascites or free fluid in Wagner's pouch. The right kidney measures up to 10.6 cm in le
ngth and shows hydronephrosis without echogenic shadowing focus to suggest nephrolithiasis. 



IMPRESSION:  

Cholelithiasis. The gallbladder is incompletely distended with equivocal wall thickening. Consider nu
clear medicine hepatobiliary scan for more specific evaluation of cholecystitis as warranted. 



Right-sided hydronephrosis. Consider CT if this finding has not been previously documented.
No

## 2025-07-01 NOTE — PROVIDER CONTACT NOTE (OTHER) - SITUATION
Pt was walking to wheelchair to be dc'd and all of a sudden felt lightheaded, dizzy. Pts weiss has had high output due to diuretics.

## 2025-07-01 NOTE — PROGRESS NOTE ADULT - SUBJECTIVE AND OBJECTIVE BOX
UROLOGY PROGRESS NOTE:     Subjective: Patient seen and examined at bedside. No major events overnight      Objective:  Vital signs  T(F): , Max: 98.7 (06-30-25 @ 12:50)  HR: 78 (07-01-25 @ 05:00)  BP: 148/76 (07-01-25 @ 05:00)  SpO2: 98% (07-01-25 @ 05:00)  Wt(kg): --    Output     I&O's Detail    30 Jun 2025 07:01  -  01 Jul 2025 07:00  --------------------------------------------------------  IN:    Oral Fluid: 1290 mL  Total IN: 1290 mL    OUT:    Indwelling Catheter - Urethral (mL): 32334 mL  Total OUT: 91130 mL    Total NET: -9260 mL      Physical Exam:  Gen: no acute distress  Back: no CVAT b/l  Abd: soft nt nd  : weiss in place, No CBI, urine clear    Labs:                        8.1    7.94  )-----------( 133      ( 30 Jun 2025 07:21 )             25.6     06-30    139  |  106  |  8   ----------------------------<  110[H]  3.8   |  25  |  0.68    Ca    8.5      30 Jun 2025 07:19  Phos  2.5     06-29  Mg     2.0     06-29

## 2025-07-02 ENCOUNTER — APPOINTMENT (OUTPATIENT)
Dept: UROLOGY | Facility: CLINIC | Age: 85
End: 2025-07-02

## 2025-07-02 VITALS
OXYGEN SATURATION: 96 % | TEMPERATURE: 98 F | HEART RATE: 84 BPM | RESPIRATION RATE: 18 BRPM | SYSTOLIC BLOOD PRESSURE: 102 MMHG | DIASTOLIC BLOOD PRESSURE: 62 MMHG

## 2025-07-02 LAB
ANION GAP SERPL CALC-SCNC: 13 MMOL/L — SIGNIFICANT CHANGE UP (ref 5–17)
BUN SERPL-MCNC: 14 MG/DL — SIGNIFICANT CHANGE UP (ref 7–23)
CALCIUM SERPL-MCNC: 9.5 MG/DL — SIGNIFICANT CHANGE UP (ref 8.4–10.5)
CHLORIDE SERPL-SCNC: 96 MMOL/L — SIGNIFICANT CHANGE UP (ref 96–108)
CO2 SERPL-SCNC: 28 MMOL/L — SIGNIFICANT CHANGE UP (ref 22–31)
CREAT SERPL-MCNC: 0.86 MG/DL — SIGNIFICANT CHANGE UP (ref 0.5–1.3)
EGFR: 85 ML/MIN/1.73M2 — SIGNIFICANT CHANGE UP
EGFR: 85 ML/MIN/1.73M2 — SIGNIFICANT CHANGE UP
GLUCOSE SERPL-MCNC: 106 MG/DL — HIGH (ref 70–99)
HCT VFR BLD CALC: 26.7 % — LOW (ref 39–50)
HGB BLD-MCNC: 8.6 G/DL — LOW (ref 13–17)
MCHC RBC-ENTMCNC: 29.3 PG — SIGNIFICANT CHANGE UP (ref 27–34)
MCHC RBC-ENTMCNC: 32.2 G/DL — SIGNIFICANT CHANGE UP (ref 32–36)
MCV RBC AUTO: 90.8 FL — SIGNIFICANT CHANGE UP (ref 80–100)
NRBC # BLD AUTO: 0 K/UL — SIGNIFICANT CHANGE UP (ref 0–0)
NRBC # FLD: 0 K/UL — SIGNIFICANT CHANGE UP (ref 0–0)
NRBC BLD AUTO-RTO: 0 /100 WBCS — SIGNIFICANT CHANGE UP (ref 0–0)
PLATELET # BLD AUTO: 168 K/UL — SIGNIFICANT CHANGE UP (ref 150–400)
PMV BLD: 9.4 FL — SIGNIFICANT CHANGE UP (ref 7–13)
POTASSIUM SERPL-MCNC: 3.8 MMOL/L — SIGNIFICANT CHANGE UP (ref 3.5–5.3)
POTASSIUM SERPL-SCNC: 3.8 MMOL/L — SIGNIFICANT CHANGE UP (ref 3.5–5.3)
RBC # BLD: 2.94 M/UL — LOW (ref 4.2–5.8)
RBC # FLD: 18 % — HIGH (ref 10.3–14.5)
SODIUM SERPL-SCNC: 137 MMOL/L — SIGNIFICANT CHANGE UP (ref 135–145)
SURGICAL PATHOLOGY STUDY: SIGNIFICANT CHANGE UP
WBC # BLD: 7.03 K/UL — SIGNIFICANT CHANGE UP (ref 3.8–10.5)
WBC # FLD AUTO: 7.03 K/UL — SIGNIFICANT CHANGE UP (ref 3.8–10.5)

## 2025-07-02 PROCEDURE — 85014 HEMATOCRIT: CPT

## 2025-07-02 PROCEDURE — 80048 BASIC METABOLIC PNL TOTAL CA: CPT

## 2025-07-02 PROCEDURE — 82330 ASSAY OF CALCIUM: CPT

## 2025-07-02 PROCEDURE — 85610 PROTHROMBIN TIME: CPT

## 2025-07-02 PROCEDURE — 82947 ASSAY GLUCOSE BLOOD QUANT: CPT

## 2025-07-02 PROCEDURE — 82550 ASSAY OF CK (CPK): CPT

## 2025-07-02 PROCEDURE — P9045: CPT

## 2025-07-02 PROCEDURE — P9016: CPT

## 2025-07-02 PROCEDURE — 84484 ASSAY OF TROPONIN QUANT: CPT

## 2025-07-02 PROCEDURE — C1782: CPT

## 2025-07-02 PROCEDURE — 93005 ELECTROCARDIOGRAM TRACING: CPT

## 2025-07-02 PROCEDURE — 86965 POOLING BLOOD PLATELETS: CPT

## 2025-07-02 PROCEDURE — C1769: CPT

## 2025-07-02 PROCEDURE — 86850 RBC ANTIBODY SCREEN: CPT

## 2025-07-02 PROCEDURE — 71045 X-RAY EXAM CHEST 1 VIEW: CPT

## 2025-07-02 PROCEDURE — 88305 TISSUE EXAM BY PATHOLOGIST: CPT

## 2025-07-02 PROCEDURE — 86901 BLOOD TYPING SEROLOGIC RH(D): CPT

## 2025-07-02 PROCEDURE — 82553 CREATINE MB FRACTION: CPT

## 2025-07-02 PROCEDURE — 84145 PROCALCITONIN (PCT): CPT

## 2025-07-02 PROCEDURE — 86922 COMPATIBILITY TEST ANTIGLOB: CPT

## 2025-07-02 PROCEDURE — 82435 ASSAY OF BLOOD CHLORIDE: CPT

## 2025-07-02 PROCEDURE — 86900 BLOOD TYPING SEROLOGIC ABO: CPT

## 2025-07-02 PROCEDURE — 97162 PT EVAL MOD COMPLEX 30 MIN: CPT

## 2025-07-02 PROCEDURE — 83880 ASSAY OF NATRIURETIC PEPTIDE: CPT

## 2025-07-02 PROCEDURE — 85018 HEMOGLOBIN: CPT

## 2025-07-02 PROCEDURE — 36415 COLL VENOUS BLD VENIPUNCTURE: CPT

## 2025-07-02 PROCEDURE — 86870 RBC ANTIBODY IDENTIFICATION: CPT

## 2025-07-02 PROCEDURE — 36430 TRANSFUSION BLD/BLD COMPNT: CPT

## 2025-07-02 PROCEDURE — P9040: CPT

## 2025-07-02 PROCEDURE — 97165 OT EVAL LOW COMPLEX 30 MIN: CPT

## 2025-07-02 PROCEDURE — 85027 COMPLETE CBC AUTOMATED: CPT

## 2025-07-02 PROCEDURE — 84132 ASSAY OF SERUM POTASSIUM: CPT

## 2025-07-02 PROCEDURE — 86902 BLOOD TYPE ANTIGEN DONOR EA: CPT

## 2025-07-02 PROCEDURE — 84295 ASSAY OF SERUM SODIUM: CPT

## 2025-07-02 PROCEDURE — C1889: CPT

## 2025-07-02 PROCEDURE — 86880 COOMBS TEST DIRECT: CPT

## 2025-07-02 PROCEDURE — 83605 ASSAY OF LACTIC ACID: CPT

## 2025-07-02 PROCEDURE — C9399: CPT

## 2025-07-02 PROCEDURE — 85396 CLOTTING ASSAY WHOLE BLOOD: CPT

## 2025-07-02 PROCEDURE — 97110 THERAPEUTIC EXERCISES: CPT

## 2025-07-02 PROCEDURE — P9059: CPT

## 2025-07-02 PROCEDURE — 82803 BLOOD GASES ANY COMBINATION: CPT

## 2025-07-02 PROCEDURE — 97116 GAIT TRAINING THERAPY: CPT

## 2025-07-02 PROCEDURE — 85730 THROMBOPLASTIN TIME PARTIAL: CPT

## 2025-07-02 PROCEDURE — P9012: CPT

## 2025-07-02 PROCEDURE — 83735 ASSAY OF MAGNESIUM: CPT

## 2025-07-02 PROCEDURE — 84100 ASSAY OF PHOSPHORUS: CPT

## 2025-07-02 PROCEDURE — C8929: CPT

## 2025-07-02 RX ADMIN — ENOXAPARIN SODIUM 40 MILLIGRAM(S): 100 INJECTION SUBCUTANEOUS at 11:04

## 2025-07-02 RX ADMIN — DORZOLAMIDE HYDROCHLORIDE AND TIMOLOL MALEATE 1 DROP(S): 20; 5 SOLUTION/ DROPS OPHTHALMIC at 08:09

## 2025-07-02 RX ADMIN — OXYBUTYNIN CHLORIDE 5 MILLIGRAM(S): 5 TABLET, FILM COATED, EXTENDED RELEASE ORAL at 06:38

## 2025-07-02 RX ADMIN — AMLODIPINE BESYLATE 2.5 MILLIGRAM(S): 10 TABLET ORAL at 08:09

## 2025-07-02 RX ADMIN — Medication 1 APPLICATION(S): at 08:09

## 2025-07-02 RX ADMIN — FUROSEMIDE 40 MILLIGRAM(S): 10 INJECTION INTRAMUSCULAR; INTRAVENOUS at 08:10

## 2025-07-02 RX ADMIN — Medication 5 MILLILITER(S): at 08:09

## 2025-07-02 RX ADMIN — Medication 100 MILLIGRAM(S): at 06:35

## 2025-07-02 RX ADMIN — OXYBUTYNIN CHLORIDE 5 MILLIGRAM(S): 5 TABLET, FILM COATED, EXTENDED RELEASE ORAL at 11:05

## 2025-07-02 RX ADMIN — Medication 40 MILLIEQUIVALENT(S): at 02:59

## 2025-07-02 RX ADMIN — POLYETHYLENE GLYCOL 3350 17 GRAM(S): 17 POWDER, FOR SOLUTION ORAL at 11:06

## 2025-07-02 RX ADMIN — FINASTERIDE 5 MILLIGRAM(S): 1 TABLET, FILM COATED ORAL at 11:05

## 2025-07-02 RX ADMIN — EZETIMIBE 10 MILLIGRAM(S): 10 TABLET ORAL at 11:04

## 2025-07-02 RX ADMIN — Medication 25 GRAM(S): at 08:10

## 2025-07-02 RX ADMIN — Medication 1 TABLET(S): at 11:05

## 2025-07-02 RX ADMIN — Medication 40 MILLIGRAM(S): at 06:42

## 2025-07-02 NOTE — PROGRESS NOTE ADULT - SUBJECTIVE AND OBJECTIVE BOX
UROLOGY PROGRESS NOTE:     Subjective: Patient seen and examined at bedside.       Objective:  Vital signs  T(F): , Max: 98.3 (07-01-25 @ 21:11)  HR: 83 (07-02-25 @ 05:46)  BP: 157/67 (07-02-25 @ 05:46)  SpO2: 95% (07-02-25 @ 05:46)  Wt(kg): --    Output     I&O's Detail    01 Jul 2025 07:01  -  02 Jul 2025 07:00  --------------------------------------------------------  IN:    Oral Fluid: 1130 mL  Total IN: 1130 mL    OUT:    Indwelling Catheter - Urethral (mL): 6350 mL  Total OUT: 6350 mL    Total NET: -5220 mL          Physical Exam:  Gen: no acute distress  Abd: soft nt nd  : weiss in place, urine clear yellow    Labs:                        8.6    7.03  )-----------( 168      ( 02 Jul 2025 06:51 )             26.7     07-02    137  |  96  |  14  ----------------------------<  106[H]  3.8   |  28  |  0.86    Ca    9.5      02 Jul 2025 06:51

## 2025-07-02 NOTE — PROGRESS NOTE ADULT - ASSESSMENT
A/P: 84y Male s/p Laser enucleation of the prostate, left ureteral stent removal 6/26, with post op bleeding, hypotension requiring ICU admission, 3U PRBCs, 1U FFP; s/p cysto, clot evacuation, fulguration 6/27    -cbc, bmp  -f/u medicine  -cont to hold eliquis  -discharge planning (will go home with weiss in place)

## 2025-07-02 NOTE — PROGRESS NOTE ADULT - ATTENDING COMMENTS
As above   events noted  requiring pressor and CBI on fast drip  Rabago remains on traction with minimal improvement of urine color  Needs emergent OR for cystoscopy, clot evacuation and fulguration, possible open cystotomy and exploration of prostatic fossa  indications, R/b/a explained to patient and daughter and wife  Consent obtained  NPO except meds
As above  Requiring oxygen via nasal cannula  Labs reviewed  Continue antibiotics  Follow-up medicine regarding diuresis  Chest x-ray
Events from yesterday noted  Patient was getting wheeled out for discharge, was not looking well and mention being lightheaded.  Vital signs revealed blood pressure 90s/60s.  Discharge was canceled.  Vital signs repeated 2 hours later showed persistent soft blood pressures 90s/50s.  Stat labs ordered.  Hemoglobin noted to be stable, BMP showed hypokalemia.  With hydration, his blood pressure improved to 146/73.  Patient feels much better.    Repeat labs pending  Observe patient after breakfast and ambulation  If patient is able to ambulate without lightheadedness or change in blood pressure, then can discharge
Patient feels well  No complaints  Tolerating regular diet  Urine output noted  Follow-up labs  Continue Zosyn and Diflucan  Follow-up chest x-ray  Continue to hold Eliquis for now  Anticipate discharge today if cleared by medicine
Patient seen and examined with team  Agree with above evaluation and management plan  Off pressors currently  Urine remains clear yellow   abdomen soft, nondistended  Clamp CBI  Continue Zosyn and fluconazole  Oxybutynin for bladder spasms
As above  continue zosyn and fluconazole  CBI clamped  monitor urine color

## 2025-07-07 ENCOUNTER — APPOINTMENT (OUTPATIENT)
Dept: UROLOGY | Facility: CLINIC | Age: 85
End: 2025-07-07
Payer: MEDICARE

## 2025-07-07 ENCOUNTER — OUTPATIENT (OUTPATIENT)
Dept: OUTPATIENT SERVICES | Facility: HOSPITAL | Age: 85
LOS: 1 days | End: 2025-07-07
Payer: MEDICARE

## 2025-07-07 VITALS
HEART RATE: 67 BPM | DIASTOLIC BLOOD PRESSURE: 67 MMHG | OXYGEN SATURATION: 99 % | RESPIRATION RATE: 16 BRPM | SYSTOLIC BLOOD PRESSURE: 144 MMHG

## 2025-07-07 DIAGNOSIS — R35.0 FREQUENCY OF MICTURITION: ICD-10-CM

## 2025-07-07 DIAGNOSIS — Z98.49 CATARACT EXTRACTION STATUS, UNSPECIFIED EYE: Chronic | ICD-10-CM

## 2025-07-07 DIAGNOSIS — Z98.890 OTHER SPECIFIED POSTPROCEDURAL STATES: Chronic | ICD-10-CM

## 2025-07-07 DIAGNOSIS — Z96.652 PRESENCE OF LEFT ARTIFICIAL KNEE JOINT: Chronic | ICD-10-CM

## 2025-07-07 PROCEDURE — 51700 IRRIGATION OF BLADDER: CPT | Mod: 58

## 2025-07-07 PROCEDURE — 99024 POSTOP FOLLOW-UP VISIT: CPT

## 2025-07-07 PROCEDURE — 51700 IRRIGATION OF BLADDER: CPT

## 2025-07-08 DIAGNOSIS — N20.0 CALCULUS OF KIDNEY: ICD-10-CM

## 2025-07-08 DIAGNOSIS — N40.1 BENIGN PROSTATIC HYPERPLASIA WITH LOWER URINARY TRACT SYMPTOMS: ICD-10-CM

## 2025-07-14 PROBLEM — R39.9 UTI SYMPTOMS: Status: ACTIVE | Noted: 2025-07-14

## 2025-07-16 LAB
APPEARANCE: ABNORMAL
BACTERIA: ABNORMAL /HPF
BILIRUBIN URINE: NEGATIVE
BLOOD URINE: ABNORMAL
CALCIUM OXALATE CRYSTALS: PRESENT
CAST: NORMAL /LPF
COLOR: NORMAL
EPITHELIAL CELLS: 0 /HPF
GLUCOSE QUALITATIVE U: NEGATIVE MG/DL
KETONES URINE: NEGATIVE MG/DL
LEUKOCYTE ESTERASE URINE: ABNORMAL
MICROSCOPIC-UA: NORMAL
NITRITE URINE: POSITIVE
PH URINE: 5.5
PROTEIN URINE: 100 MG/DL
RED BLOOD CELLS URINE: 274 /HPF
REVIEW: NORMAL
SPECIFIC GRAVITY URINE: 1.02
UROBILINOGEN URINE: 1 MG/DL
WHITE BLOOD CELLS URINE: 909 /HPF

## 2025-07-18 LAB — BACTERIA UR CULT: ABNORMAL

## 2025-07-18 RX ORDER — SULFAMETHOXAZOLE AND TRIMETHOPRIM 800; 160 MG/1; MG/1
800-160 TABLET ORAL TWICE DAILY
Qty: 20 | Refills: 0 | Status: ACTIVE | COMMUNITY
Start: 2025-07-18 | End: 1900-01-01

## 2025-07-21 ENCOUNTER — NON-APPOINTMENT (OUTPATIENT)
Age: 85
End: 2025-07-21

## 2025-08-04 LAB — BACTERIA UR CULT: ABNORMAL

## 2025-08-06 ENCOUNTER — NON-APPOINTMENT (OUTPATIENT)
Age: 85
End: 2025-08-06

## 2025-08-13 ENCOUNTER — APPOINTMENT (OUTPATIENT)
Dept: UROLOGY | Facility: CLINIC | Age: 85
End: 2025-08-13
Payer: MEDICARE

## 2025-08-13 DIAGNOSIS — N13.8 BENIGN PROSTATIC HYPERPLASIA WITH LOWER URINARY TRACT SYMPMS: ICD-10-CM

## 2025-08-13 DIAGNOSIS — R31.0 GROSS HEMATURIA: ICD-10-CM

## 2025-08-13 DIAGNOSIS — N40.1 BENIGN PROSTATIC HYPERPLASIA WITH LOWER URINARY TRACT SYMPMS: ICD-10-CM

## 2025-08-13 PROCEDURE — 99024 POSTOP FOLLOW-UP VISIT: CPT

## 2025-08-14 ENCOUNTER — APPOINTMENT (OUTPATIENT)
Dept: INFECTIOUS DISEASE | Facility: CLINIC | Age: 85
End: 2025-08-14
Payer: MEDICARE

## 2025-08-14 ENCOUNTER — LABORATORY RESULT (OUTPATIENT)
Age: 85
End: 2025-08-14

## 2025-08-14 ENCOUNTER — NON-APPOINTMENT (OUTPATIENT)
Age: 85
End: 2025-08-14

## 2025-08-14 VITALS
TEMPERATURE: 97.7 F | SYSTOLIC BLOOD PRESSURE: 118 MMHG | HEART RATE: 74 BPM | DIASTOLIC BLOOD PRESSURE: 50 MMHG | BODY MASS INDEX: 26.68 KG/M2 | WEIGHT: 197 LBS | HEIGHT: 72 IN | OXYGEN SATURATION: 99 %

## 2025-08-14 PROCEDURE — 99215 OFFICE O/P EST HI 40 MIN: CPT

## 2025-08-14 RX ORDER — AMLODIPINE BESYLATE 2.5 MG/1
2.5 TABLET ORAL DAILY
Qty: 90 | Refills: 3 | Status: ACTIVE | COMMUNITY

## 2025-08-14 RX ORDER — FUROSEMIDE 40 MG/1
40 TABLET ORAL DAILY
Refills: 0 | Status: ACTIVE | COMMUNITY

## 2025-08-14 RX ORDER — PRAVASTATIN SODIUM 40 MG/1
40 TABLET ORAL
Refills: 0 | Status: ACTIVE | COMMUNITY

## 2025-08-15 LAB
APPEARANCE: ABNORMAL
BILIRUBIN URINE: ABNORMAL
BLOOD URINE: ABNORMAL
COLOR: ABNORMAL
GLUCOSE QUALITATIVE U: NEGATIVE MG/DL
KETONES URINE: NEGATIVE MG/DL
LEUKOCYTE ESTERASE URINE: ABNORMAL
NITRITE URINE: POSITIVE
PH URINE: 5
PROTEIN URINE: 100 MG/DL
SPECIFIC GRAVITY URINE: 1.02
UROBILINOGEN URINE: 0.2 MG/DL

## 2025-08-18 ENCOUNTER — LABORATORY RESULT (OUTPATIENT)
Age: 85
End: 2025-08-18

## 2025-08-19 PROBLEM — R31.0 GROSS HEMATURIA: Status: ACTIVE | Noted: 2025-08-19

## 2025-08-19 LAB
ALBUMIN SERPL ELPH-MCNC: 3.9 G/DL
ALP BLD-CCNC: 125 U/L
ALT SERPL-CCNC: 15 U/L
ANION GAP SERPL CALC-SCNC: 10 MMOL/L
APPEARANCE: ABNORMAL
AST SERPL-CCNC: 26 U/L
BASOPHILS # BLD AUTO: 0.04 K/UL
BASOPHILS NFR BLD AUTO: 0.9 %
BILIRUB SERPL-MCNC: 0.6 MG/DL
BILIRUBIN URINE: NEGATIVE
BLOOD URINE: ABNORMAL
BUN SERPL-MCNC: 16 MG/DL
CALCIUM SERPL-MCNC: 9 MG/DL
CHLORIDE SERPL-SCNC: 104 MMOL/L
CO2 SERPL-SCNC: 26 MMOL/L
COLOR: YELLOW
CREAT SERPL-MCNC: 0.76 MG/DL
EGFRCR SERPLBLD CKD-EPI 2021: 89 ML/MIN/1.73M2
EOSINOPHIL # BLD AUTO: 0.25 K/UL
EOSINOPHIL NFR BLD AUTO: 5.3 %
GLUCOSE QUALITATIVE U: NEGATIVE MG/DL
GLUCOSE SERPL-MCNC: 84 MG/DL
HCT VFR BLD CALC: 28.8 %
HGB BLD-MCNC: 8.5 G/DL
KETONES URINE: NEGATIVE MG/DL
LEUKOCYTE ESTERASE URINE: ABNORMAL
LYMPHOCYTES # BLD AUTO: 1.43 K/UL
LYMPHOCYTES NFR BLD AUTO: 30.7 %
MAN DIFF?: NORMAL
MCHC RBC-ENTMCNC: 29.3 PG
MCHC RBC-ENTMCNC: 29.5 G/DL
MCV RBC AUTO: 99.3 FL
MONOCYTES # BLD AUTO: 0.16 K/UL
MONOCYTES NFR BLD AUTO: 3.5 %
NEUTROPHILS # BLD AUTO: 2.78 K/UL
NEUTROPHILS NFR BLD AUTO: 59.6 %
NITRITE URINE: NEGATIVE
PH URINE: 6.5
PLATELET # BLD AUTO: 165 K/UL
POTASSIUM SERPL-SCNC: 4.8 MMOL/L
PROT SERPL-MCNC: 6.6 G/DL
PROTEIN URINE: 30 MG/DL
RBC # BLD: 2.9 M/UL
RBC # FLD: 20.7 %
SODIUM SERPL-SCNC: 140 MMOL/L
SPECIFIC GRAVITY URINE: 1.01
UROBILINOGEN URINE: 0.2 MG/DL
WBC # FLD AUTO: 4.66 K/UL

## 2025-08-21 ENCOUNTER — OFFICE (OUTPATIENT)
Dept: URBAN - METROPOLITAN AREA CLINIC 38 | Facility: CLINIC | Age: 85
Setting detail: OPHTHALMOLOGY
End: 2025-08-21
Payer: MEDICARE

## 2025-08-21 DIAGNOSIS — H40.043: ICD-10-CM

## 2025-08-21 DIAGNOSIS — H40.1111: ICD-10-CM

## 2025-08-21 DIAGNOSIS — H40.1123: ICD-10-CM

## 2025-08-21 PROBLEM — H02.035: Status: ACTIVE | Noted: 2025-08-21

## 2025-08-21 PROBLEM — H02.034: Status: ACTIVE | Noted: 2025-08-21

## 2025-08-21 PROBLEM — H02.031: Status: ACTIVE | Noted: 2025-08-21

## 2025-08-21 PROBLEM — H02.032: Status: ACTIVE | Noted: 2025-08-21

## 2025-08-21 PROCEDURE — 92083 EXTENDED VISUAL FIELD XM: CPT | Performed by: STUDENT IN AN ORGANIZED HEALTH CARE EDUCATION/TRAINING PROGRAM

## 2025-08-21 PROCEDURE — 99213 OFFICE O/P EST LOW 20 MIN: CPT | Performed by: STUDENT IN AN ORGANIZED HEALTH CARE EDUCATION/TRAINING PROGRAM

## 2025-08-21 PROCEDURE — 92133 CPTRZD OPH DX IMG PST SGM ON: CPT | Performed by: STUDENT IN AN ORGANIZED HEALTH CARE EDUCATION/TRAINING PROGRAM

## 2025-08-21 ASSESSMENT — REFRACTION_CURRENTRX
OS_OVR_VA: 20/
OS_SPHERE: -0.75
OD_AXIS: 098
OS_AXIS: 075
OD_ADD: +2.75
OD_CYLINDER: -0.50
OS_CYLINDER: -1.00
OD_SPHERE: PLANO
OD_AXIS: 097
OD_OVR_VA: 20/
OD_ADD: +2.75
OD_VPRISM_DIRECTION: PROGS
OD_VPRISM_DIRECTION: PROGS
OS_ADD: +2.50
OS_ADD: +2.75
OS_AXIS: 067
OD_CYLINDER: -0.50
OS_OVR_VA: 20/
OS_VPRISM_DIRECTION: PROGS
OS_SPHERE: -0.50
OS_CYLINDER: -1.00
OD_OVR_VA: 20/
OS_VPRISM_DIRECTION: PROGS
OD_SPHERE: -0.25

## 2025-08-21 ASSESSMENT — REFRACTION_MANIFEST
OD_SPHERE: +0.50
OD_ADD: +3.00
OD_CYLINDER: -0.50
OS_AXIS: 080
OS_VA2: 20/25(J1)
OU_VA: 20/25
OS_ADD: +3.00
OS_CYLINDER: -3.25
OD_VA2: 20/25(J1)
OS_SPHERE: +1.50
OS_VA1: 20/40-1
OD_AXIS: 100
OD_VA1: 20/25

## 2025-08-21 ASSESSMENT — KERATOMETRY
METHOD_AUTO_MANUAL: AUTO
OD_K1POWER_DIOPTERS: 40.75
OS_K1POWER_DIOPTERS: 40.25
OS_AXISANGLE_DEGREES: 170
OD_K2POWER_DIOPTERS: 41.50
OS_K2POWER_DIOPTERS: 42.50
OD_AXISANGLE_DEGREES: 009

## 2025-08-21 ASSESSMENT — REFRACTION_AUTOREFRACTION
OS_CYLINDER: -3.00
OS_AXIS: 081
OD_AXIS: 099
OD_CYLINDER: -1.25
OS_SPHERE: +1.50
OD_SPHERE: +0.75

## 2025-08-21 ASSESSMENT — LID POSITION - PTOSIS
OD_PTOSIS: RUL 2+
OS_PTOSIS: LUL 2+

## 2025-08-21 ASSESSMENT — CONFRONTATIONAL VISUAL FIELD TEST (CVF)
OD_FINDINGS: FULL
OS_FINDINGS: FULL

## 2025-08-21 ASSESSMENT — LID POSITION - COMMENTS
OD_COMMENTS: HORIZONTAL LID LAXITY
OS_COMMENTS: HORIZONTAL LID LAXITY

## 2025-08-21 ASSESSMENT — VISUAL ACUITY
OD_BCVA: 20/40-1
OS_BCVA: 20/30

## 2025-08-21 ASSESSMENT — PACHYMETRY
OS_CT_UM: 510
OD_CT_UM: 509
OS_CT_CORRECTION: 2
OD_CT_CORRECTION: 3

## 2025-08-21 ASSESSMENT — LID POSITION - ENTROPION
OD_ENTROPION: RLL RUL 2+
OS_ENTROPION: LLL LUL 2+

## 2025-08-21 ASSESSMENT — TONOMETRY: OS_IOP_MMHG: 11

## 2025-08-25 ENCOUNTER — APPOINTMENT (OUTPATIENT)
Dept: UROLOGY | Facility: CLINIC | Age: 85
End: 2025-08-25

## 2025-09-02 ENCOUNTER — NON-APPOINTMENT (OUTPATIENT)
Age: 85
End: 2025-09-02

## 2025-09-02 LAB
APPEARANCE: ABNORMAL
BACTERIA UR CULT: ABNORMAL
BACTERIA: ABNORMAL /HPF
BILIRUBIN URINE: NEGATIVE
BLOOD URINE: ABNORMAL
CAST: 0 /LPF
COLOR: YELLOW
EPITHELIAL CELLS: 0 /HPF
GLUCOSE QUALITATIVE U: NEGATIVE MG/DL
KETONES URINE: NEGATIVE MG/DL
LEUKOCYTE ESTERASE URINE: ABNORMAL
MICROSCOPIC-UA: NORMAL
NITRITE URINE: POSITIVE
PH URINE: 6
PROTEIN URINE: 100 MG/DL
RED BLOOD CELLS URINE: 63 /HPF
SPECIFIC GRAVITY URINE: 1.02
UROBILINOGEN URINE: 1 MG/DL
WHITE BLOOD CELLS URINE: >998 /HPF

## 2025-09-04 ENCOUNTER — APPOINTMENT (OUTPATIENT)
Dept: INFECTIOUS DISEASE | Facility: CLINIC | Age: 85
End: 2025-09-04

## 2025-09-04 VITALS
HEART RATE: 84 BPM | WEIGHT: 191 LBS | SYSTOLIC BLOOD PRESSURE: 126 MMHG | HEIGHT: 72 IN | OXYGEN SATURATION: 98 % | TEMPERATURE: 96.5 F | BODY MASS INDEX: 25.87 KG/M2 | DIASTOLIC BLOOD PRESSURE: 56 MMHG

## 2025-09-08 ENCOUNTER — APPOINTMENT (OUTPATIENT)
Dept: UROLOGY | Facility: CLINIC | Age: 85
End: 2025-09-08
Payer: MEDICARE

## 2025-09-08 VITALS
SYSTOLIC BLOOD PRESSURE: 160 MMHG | DIASTOLIC BLOOD PRESSURE: 65 MMHG | RESPIRATION RATE: 16 BRPM | HEART RATE: 70 BPM | OXYGEN SATURATION: 98 %

## 2025-09-08 DIAGNOSIS — N40.1 BENIGN PROSTATIC HYPERPLASIA WITH LOWER URINARY TRACT SYMPMS: ICD-10-CM

## 2025-09-08 DIAGNOSIS — N13.8 BENIGN PROSTATIC HYPERPLASIA WITH LOWER URINARY TRACT SYMPMS: ICD-10-CM

## 2025-09-08 DIAGNOSIS — R31.0 GROSS HEMATURIA: ICD-10-CM

## 2025-09-08 PROCEDURE — 99024 POSTOP FOLLOW-UP VISIT: CPT

## 2025-09-08 PROCEDURE — 52000 CYSTOURETHROSCOPY: CPT | Mod: 78

## 2025-09-08 RX ORDER — AMOXICILLIN AND CLAVULANATE POTASSIUM 875; 125 MG/1; MG/1
875-125 TABLET, COATED ORAL
Qty: 14 | Refills: 0 | Status: COMPLETED | COMMUNITY
Start: 2025-09-02 | End: 2025-09-08

## (undated) DEVICE — DRAPE TOWEL BLUE 17" X 24"

## (undated) DEVICE — SPECIMEN CONTAINER 100ML

## (undated) DEVICE — ELCTR PLASMA LOOP MEDIUM ANGLED 24FR 12-30 DEG

## (undated) DEVICE — MEDELA OVERFLOW FILTER DISPOSABLE

## (undated) DEVICE — SOL IRR POUR H2O 1500ML

## (undated) DEVICE — PACK CYSTO

## (undated) DEVICE — Device

## (undated) DEVICE — FOLEY CATH 2-WAY 20FR 5CC LATEX COUDE RED

## (undated) DEVICE — TUBING SET FOR SUCTION PUMP

## (undated) DEVICE — ELCTR PLASMA LOOP LARGE 24FR 12-16 DEG

## (undated) DEVICE — ADAPTER CHECK FLO 9FR STERILE

## (undated) DEVICE — PREP BETADINE KIT

## (undated) DEVICE — DRAPE U (CLEAR) 47 X 51" NON STERILE

## (undated) DEVICE — ACMI SELF-SEALING SEAL UP TO 7FR

## (undated) DEVICE — TUBING SUCTION 20FT

## (undated) DEVICE — SOL IRR POUR NS 0.9% 500ML

## (undated) DEVICE — IRRIGATION TRAY W PISTON SYRINGE 60ML

## (undated) DEVICE — VENODYNE/SCD SLEEVE CALF MEDIUM

## (undated) DEVICE — SOL IRR BAG H2O 3000ML

## (undated) DEVICE — FOLEY HOLDER STATLOCK 3 WAY ADULT

## (undated) DEVICE — GOWN LG

## (undated) DEVICE — WARMING BLANKET UPPER ADULT

## (undated) DEVICE — POSITIONER FOAM HEADREST (PINK)

## (undated) DEVICE — NDL 20CM TROCAR

## (undated) DEVICE — BOSTON SCIENTIFC ENCORE 26 INFLATOR

## (undated) DEVICE — TUBING SET TUR BLADDER IRRIGATION Y-TYPE 81"

## (undated) DEVICE — DRAPE EQUIPMENT BANDED BAG 30 X 30" (SHOWER CAP)

## (undated) DEVICE — SYR IRRIGATION PISTON 60CC

## (undated) DEVICE — GOWN TRIMAX LG

## (undated) DEVICE — FOLEY HOLDER STATLOCK 2 WAY ADULT

## (undated) DEVICE — FOLEY CATH 3-WAY 22FR 30CC LATEX HEMATURIA COUDE

## (undated) DEVICE — LABELS BLANK W PEN

## (undated) DEVICE — SOL IRR BAG NS 0.9% 3000ML

## (undated) DEVICE — POSITIONER FOAM EGG CRATE ULNAR 2PCS (PINK)

## (undated) DEVICE — CANISTER DISPOSABLE THIN WALL 3000CC

## (undated) DEVICE — SYR ASEPTO

## (undated) DEVICE — SUT POLYSORB 2-0 18" V-20

## (undated) DEVICE — AMPLATZ RENAL DILATOR 6FR-30FR X 20CM

## (undated) DEVICE — FOLEY TRAY 16FR 5CC LTX UMETER CLOSED

## (undated) DEVICE — ELCTR PLASMA BUTTON OVAL 24FR 12-30 DEG

## (undated) DEVICE — BLADDER EVACUATOR ELLIK DISP STERILE

## (undated) DEVICE — BLADE SURGICAL #11 CARBON

## (undated) DEVICE — NSM-GU WOLF PIRANHA MORCELLATOR: Type: DURABLE MEDICAL EQUIPMENT

## (undated) DEVICE — GLV 8 PROTEXIS (WHITE)

## (undated) DEVICE — FOLEY CATH 3-WAY 22FR 30CC LATEX LUBRICATH

## (undated) DEVICE — DRAPE NEPHROSCOPY 72X118"

## (undated) DEVICE — DRAPE LINGEMAN TUR

## (undated) DEVICE — GOWN XXXL

## (undated) DEVICE — DRAPE U (CLEAR) 47 X 51"

## (undated) DEVICE — TUBING THERMADX UROLOGY

## (undated) DEVICE — NDL BIOPSY TROCAR TWO-PART 18G X 20CM

## (undated) DEVICE — POSITIONER CUSHION INSERT PRONE VIEW LG

## (undated) DEVICE — ELCTR GROUNDING PAD ADULT COVIDIEN

## (undated) DEVICE — PRESSURE INFUSOR BAG 3000ML

## (undated) DEVICE — DRSG TEGADERM 6 X 8"

## (undated) DEVICE — ELCTR PLASMA ROLLER 24FR 12-30 DEG

## (undated) DEVICE — BAG URINE W METER 2L

## (undated) DEVICE — SYR LUER LOK 30CC

## (undated) DEVICE — IRR BULB PATHFINDER + 10"

## (undated) DEVICE — NDL COUNTER FOAM AND MAGNET 40-70

## (undated) DEVICE — CONTAINER TISSUE COLLECTING DISP

## (undated) DEVICE — TUBING CONNECTING FOR DRAINAGE BAG MALE / FEMALE 14FR 30CM

## (undated) DEVICE — TUBING RANGER FLUID IRRIGATION SET DISP